# Patient Record
Sex: MALE | Race: WHITE | NOT HISPANIC OR LATINO | Employment: OTHER | URBAN - METROPOLITAN AREA
[De-identification: names, ages, dates, MRNs, and addresses within clinical notes are randomized per-mention and may not be internally consistent; named-entity substitution may affect disease eponyms.]

---

## 2017-05-18 ENCOUNTER — TELEPHONE (OUTPATIENT)
Dept: MEDICAL GROUP | Age: 77
End: 2017-05-18

## 2017-05-22 RX ORDER — TERAZOSIN 2 MG/1
CAPSULE ORAL
Refills: 1 | COMMUNITY
Start: 2017-04-24 | End: 2017-06-22 | Stop reason: SDUPTHER

## 2017-05-22 RX ORDER — LEVOTHYROXINE SODIUM 0.1 MG/1
TABLET ORAL
Refills: 1 | COMMUNITY
Start: 2017-04-24 | End: 2017-06-22 | Stop reason: SDUPTHER

## 2017-05-22 RX ORDER — CLINDAMYCIN HYDROCHLORIDE 300 MG/1
CAPSULE ORAL
Refills: 0 | COMMUNITY
Start: 2017-04-24 | End: 2017-07-13

## 2017-05-22 RX ORDER — METRONIDAZOLE 250 MG/1
TABLET ORAL
Refills: 0 | COMMUNITY
Start: 2017-04-24 | End: 2017-07-13

## 2017-05-22 NOTE — TELEPHONE ENCOUNTER
Phone Number Called: [phone] (home)     Message: Spoke with patient's spouse and left message for patient to call back.    Left Message for patient to call back: yes

## 2017-05-22 NOTE — TELEPHONE ENCOUNTER
NEW PATIENT VISIT PRE-VISIT PLANNING    1.  EpicCare Patient is checked in Patient Demographics? YES    2.  Immunizations were updated in Epic using WebIZ?: No WebIZ record       •  Web Iz Recommendations:     3.  Patient is due for the following Health Maintenance Topics:   Health Maintenance Due   Topic Date Due   • Annual Wellness Visit  1940   • IMM DTaP/Tdap/Td Vaccine (1 - Tdap) 04/17/1960   • IMM ZOSTER VACCINE  04/17/2001   • IMM PNEUMOCOCCAL 65+ (ADULT) LOW/MEDIUM RISK SERIES (1 of 2 - PCV13) 04/17/2006       - Patient has completed Colonoscopy/FIT and HMR Letter faxed to:  Records in [facility] from [facility].    4.  Reviewed/Updated the following with patient:       •   Preferred Pharmacy? YES       •   Preferred Lab? YES       •   Medications? YES. Was Abstract Encounter opened and chart updated? YES       •   Social History? YES. Was Abstract Encounter opened and chart updated? YES       •   Family History? YES. Was Abstract Encounter opened and chart updated? YES    5.  Updated Care Team?       •   DME Company (gait device, O2, CPAP, etc.) N\A       •   Other Specialists (eye doctor, derm, GYN, cardiology, endo, etc): YES    6.  Patient was informed to arrive 15 min prior to their scheduled appointment and bring in their medication bottles? YES

## 2017-05-25 ENCOUNTER — OFFICE VISIT (OUTPATIENT)
Dept: MEDICAL GROUP | Age: 77
End: 2017-05-25
Payer: MEDICARE

## 2017-05-25 VITALS
DIASTOLIC BLOOD PRESSURE: 74 MMHG | SYSTOLIC BLOOD PRESSURE: 124 MMHG | WEIGHT: 218.2 LBS | TEMPERATURE: 97.7 F | HEART RATE: 92 BPM | OXYGEN SATURATION: 93 % | BODY MASS INDEX: 33.07 KG/M2 | HEIGHT: 68 IN

## 2017-05-25 DIAGNOSIS — Z23 NEED FOR VACCINATION: ICD-10-CM

## 2017-05-25 DIAGNOSIS — E06.3 HASHIMOTO'S THYROIDITIS: ICD-10-CM

## 2017-05-25 DIAGNOSIS — E78.5 HYPERLIPIDEMIA WITH TARGET LDL LESS THAN 100: ICD-10-CM

## 2017-05-25 DIAGNOSIS — M51.9 LUMBAR DISC DISEASE: ICD-10-CM

## 2017-05-25 DIAGNOSIS — E66.9 OBESITY (BMI 30-39.9): ICD-10-CM

## 2017-05-25 DIAGNOSIS — E03.9 ACQUIRED HYPOTHYROIDISM: ICD-10-CM

## 2017-05-25 DIAGNOSIS — Z00.00 PREVENTATIVE HEALTH CARE: ICD-10-CM

## 2017-05-25 DIAGNOSIS — N18.1 CHRONIC RENAL IMPAIRMENT, STAGE 1: ICD-10-CM

## 2017-05-25 DIAGNOSIS — G47.31 PRIMARY CENTRAL SLEEP APNEA: ICD-10-CM

## 2017-05-25 PROCEDURE — 90732 PPSV23 VACC 2 YRS+ SUBQ/IM: CPT | Performed by: FAMILY MEDICINE

## 2017-05-25 PROCEDURE — 99214 OFFICE O/P EST MOD 30 MIN: CPT | Mod: 25 | Performed by: FAMILY MEDICINE

## 2017-05-25 PROCEDURE — G0009 ADMIN PNEUMOCOCCAL VACCINE: HCPCS | Performed by: FAMILY MEDICINE

## 2017-05-25 ASSESSMENT — PATIENT HEALTH QUESTIONNAIRE - PHQ9: CLINICAL INTERPRETATION OF PHQ2 SCORE: 0

## 2017-05-25 NOTE — ASSESSMENT & PLAN NOTE
Patient states that he has a history of chronic low back pain and takes Voltaren and NSAIDs as needed. Denies any loss of bladder or bowel function numbness or tingling in the perineum.

## 2017-05-25 NOTE — ASSESSMENT & PLAN NOTE
Patient states that he's been compliant with Synthroid 100 µg by mouth daily.denies any new fatigue, cold/heat intolerance, weight gain/weight loss, diarrhea/constipation, dry skin, myalgia, depressed mood, palpitations, tremmor, hair loss, and no goiter.

## 2017-05-25 NOTE — PROGRESS NOTES
This medical record contains text that has been entered with the assistance of computer voice recognition and dictation software.  Therefore, it may contain unintended errors in text, spelling, punctuation, or grammar    Chief Complaint   Patient presents with   • Other     see reason for visit       Donny Nicole is a 77 y.o. male here evaluation and management of: Establish care, back pain, sleep apnea, renal insufficiency, hypothyroid      HPI:     Sleep apnea  Wears CPAP every night  Even with daytime naps    Lumbar disc disease  Patient states that he has a history of chronic low back pain and takes Voltaren and NSAIDs as needed. Denies any loss of bladder or bowel function numbness or tingling in the perineum.    Chronic renal impairment, stage 1  Patient states he is unaware that he had renal insufficiency. He denies any abdominal pain no change in urination, no fevers, back pain no blood in urine.      Results for DONNY NICOLE (MRN [id number]) as of 5/25/2017 16:29   Ref. Range 2/5/2014 13:57   Creatinine Latest Ref Range: 0.50-1.40 mg/dL 1.39   GFR If  Latest Units: mL/min/1.73 m 2 >60   GFR If Non  Latest Units: mL/min/1.73 m 2 50 (A)       Acquired hypothyroidism  Patient states that he's been compliant with Synthroid 100 µg by mouth daily.denies any new fatigue, cold/heat intolerance, weight gain/weight loss, diarrhea/constipation, dry skin, myalgia, depressed mood, palpitations, tremmor, hair loss, and no goiter.    Preventative health care  The patient is a very pleasant 77-year-old male who presents to clinic to establish care. He has not seen his primary care provider since he left here. He has a significant past medical history of renal insufficiency, hypothyroidism, hyperlipidemia, obesity. He is a classic . The patient denied any chest pain, no sob, no dixon, no  pnd, no orthopnea, no headache, no changes in vision, no  numbness or tingling, no nausea, no diarrhea, no abdominal pain, no fevers, no chills, no bright red blood per rectum, no  difficulty urinating, no burning during micturition, no depressed mood, no other concerns.      Current medicines (including changes today)  Current Outpatient Prescriptions   Medication Sig Dispense Refill   • clindamycin (CLEOCIN) 300 MG Cap TK 1 C TID PO TAT  0   • levothyroxine (SYNTHROID) 100 MCG Tab TK 1 T PO D  COURTESY REFILL.LAST SEEN 1/2016. PLEASE  1   • terazosin (HYTRIN) 2 MG Cap TK 1 C PO QHS  CHECK WITH DOCTOR IN [place] FOR BP MONITORING AND MANAGEMENT.PLEASE  1   • atorvastatin (LIPITOR) 20 MG Tab Take 1 Tab by mouth every day. 30 Tab 0   • vitamin D, Ergocalciferol, (DRISDOL) 30558 UNITS CAPS capsule Take 1 Cap by mouth every 7 days. 8 Cap 0   • aspirin EC (ECOTRIN) 81 MG TBEC Take 81 mg by mouth every day.     • metronidazole (FLAGYL) 250 MG Tab TK 1 T PO TID  0   • levothyroxine (SYNTHROID) 75 MCG TABS Take 1 Tab by mouth every day. (Patient not taking: Reported on 5/22/2017) 30 Tab 0   • Diclofenac Sodium (VOLTAREN) 1 % GEL Apply 2 gm to effected fingers and heel qid prn. (Patient not taking: Reported on 5/22/2017) 1 Tube 3     No current facility-administered medications for this visit.     He  has a past medical history of Thyroid disease and Hyperlipidemia.  He  has past surgical history that includes dental surgery; tonsillectomy; and hernia repair.  Social History   Substance Use Topics   • Smoking status: Former Smoker -- 1.00 packs/day for 19 years     Types: Cigarettes     Quit date: 03/22/1979   • Smokeless tobacco: Never Used   • Alcohol Use: Yes      Comment: 4 drinks a week.     Social History     Social History Narrative     Family History   Problem Relation Age of Onset   • Cancer Mother      breast cancer   • Thyroid Sister      Hasimotos   • Diabetes Sister      Type 2 DM   • Thyroid Brother      Hasimotos   • Diabetes Brother      Type 2 DM   • No Known  "Problems Maternal Grandmother    • Heart Attack Maternal Grandfather      Family Status   Relation Status Death Age   • Mother  58   • Father  86   • Sister Alive    • Brother Alive    • Maternal Grandmother     • Maternal Grandfather     • Paternal Grandmother     • Paternal Grandfather           ROS  Please see history of present illness    All other systems reviewed and are negative     Objective:     Blood pressure 124/74, pulse 92, temperature 36.5 °C (97.7 °F), height 1.721 m (5' 7.76\"), weight 98.975 kg (218 lb 3.2 oz), SpO2 93 %. Body mass index is 33.42 kg/(m^2).  Physical Exam:    Constitutional: Alert, no distress.  Skin: Warm, dry, good turgor, no rashes in visible areas.  Eye: Equal, round and reactive, conjunctiva clear, lids normal.  ENMT: Lips without lesions, good dentition, oropharynx clear.  Neck: Trachea midline, no masses, no thyromegaly. No cervical or supraclavicular lymphadenopathy.  Respiratory: Unlabored respiratory effort, lungs clear to auscultation, no wheezes, no ronchi.  Cardiovascular: Normal S1, S2, no murmur, no edema.  Abdomen: Soft, non-tender, no masses, no hepatosplenomegaly.  Psych: Alert and oriented x3, normal affect and mood.          Assessment and Plan:   The following treatment plan was discussed, again this medical record contains text that has been entered with the assistance of computer voice recognition and dictation software.  Therefore, it may contain unintended errors in text, spelling, punctuation, or grammar      1. Chronic renal impairment, stage 1  Continue bp control  Avoid Nsaids and all nephrotoxins    - COMP METABOLIC PANEL; Future  - CBC WITH DIFFERENTIAL; Future    2. Need for vaccination  Given today    - Pneumococal Polysaccharide Vaccine 23-Valent =>1yo SQ/IM    3. Hyperlipidemia with target LDL less than 100  Patient has been stable with current management  We will make no changes for now    - LIPID " PROFILE; Future    4. Hashimoto's thyroiditis  Patient has been stable with current management  We will make no changes for now    - TSH WITH REFLEX TO FT4; Future    5. Lumbar disc disease    hat overall prognosis of back pain is favorable, he is to use ICE x 2 days, then switch to heat,  Nsaids,  and to ease back into normal activity as quickly as possible,  also to use proper lifting techniques (use legs not back), no clinical indication for imaging. Also counseled patient on low back stretching, hamstring stretching, core strengthening once no longer in acute pain.     6. Obesity (BMI 30-39.9)    We discussed agressive lifestyle modifications with weight loss, aerobic exercise, and eating a prudent diet, which  included avoiding fried foods, using low-fat milk and avoiding simple carbohydrate, making a food diary. If you can't run because of pain do the stationary bike/elipticle or swim 30minutes 3 times per wk, in the beginning and then gradually increase.      7. Primary central sleep apnea  Congratulated patient on being so compliant with CPAP  I encouraged him to continue    8. Acquired hypothyroidism  Needs new labs      9. Preventative health care  Care has been established  We need baseline labs to establish a clinical profile  We reviewed USPSTF guidelines  Up to date on colonoscopy   Denies intimate partner viloence          HEALTH MAINTENANCE: due for AWV    Followup: Return in about 3 months (around 8/25/2017) for Reevaluation.

## 2017-05-25 NOTE — ASSESSMENT & PLAN NOTE
Patient states he is unaware that he had renal insufficiency. He denies any abdominal pain no change in urination, no fevers, back pain no blood in urine.      Results for GUERRERO NICOLE (MRN [id number]) as of 5/25/2017 16:29   Ref. Range 2/5/2014 13:57   Creatinine Latest Ref Range: 0.50-1.40 mg/dL 1.39   GFR If  Latest Units: mL/min/1.73 m 2 >60   GFR If Non  Latest Units: mL/min/1.73 m 2 50 (A)

## 2017-05-25 NOTE — ASSESSMENT & PLAN NOTE
The patient is a very pleasant 77-year-old male who presents to clinic to establish care. He has not seen his primary care provider since he left here. He has a significant past medical history of renal insufficiency, hypothyroidism, hyperlipidemia, obesity. He is a classic . The patient denied any chest pain, no sob, no dixon, no  pnd, no orthopnea, no headache, no changes in vision, no numbness or tingling, no nausea, no diarrhea, no abdominal pain, no fevers, no chills, no bright red blood per rectum, no  difficulty urinating, no burning during micturition, no depressed mood, no other concerns.

## 2017-05-31 ENCOUNTER — HOSPITAL ENCOUNTER (OUTPATIENT)
Dept: LAB | Facility: MEDICAL CENTER | Age: 77
End: 2017-05-31
Attending: FAMILY MEDICINE
Payer: MEDICARE

## 2017-05-31 DIAGNOSIS — N18.1 CHRONIC RENAL IMPAIRMENT, STAGE 1: ICD-10-CM

## 2017-05-31 DIAGNOSIS — E78.00 HYPERCHOLESTEREMIA: ICD-10-CM

## 2017-05-31 DIAGNOSIS — E78.5 HYPERLIPIDEMIA WITH TARGET LDL LESS THAN 100: ICD-10-CM

## 2017-05-31 DIAGNOSIS — E06.3 HASHIMOTO'S THYROIDITIS: ICD-10-CM

## 2017-05-31 LAB
ALBUMIN SERPL BCP-MCNC: 4.1 G/DL (ref 3.2–4.9)
ALBUMIN/GLOB SERPL: 1.4 G/DL
ALP SERPL-CCNC: 68 U/L (ref 30–99)
ALT SERPL-CCNC: 22 U/L (ref 2–50)
ANION GAP SERPL CALC-SCNC: 7 MMOL/L (ref 0–11.9)
AST SERPL-CCNC: 20 U/L (ref 12–45)
BASOPHILS # BLD AUTO: 1 % (ref 0–1.8)
BASOPHILS # BLD: 0.05 K/UL (ref 0–0.12)
BILIRUB SERPL-MCNC: 0.8 MG/DL (ref 0.1–1.5)
BUN SERPL-MCNC: 23 MG/DL (ref 8–22)
CALCIUM SERPL-MCNC: 9.1 MG/DL (ref 8.5–10.5)
CHLORIDE SERPL-SCNC: 105 MMOL/L (ref 96–112)
CHOLEST SERPL-MCNC: 166 MG/DL (ref 100–199)
CO2 SERPL-SCNC: 28 MMOL/L (ref 20–33)
CREAT SERPL-MCNC: 1.23 MG/DL (ref 0.5–1.4)
EOSINOPHIL # BLD AUTO: 0.23 K/UL (ref 0–0.51)
EOSINOPHIL NFR BLD: 4.6 % (ref 0–6.9)
ERYTHROCYTE [DISTWIDTH] IN BLOOD BY AUTOMATED COUNT: 46 FL (ref 35.9–50)
GFR SERPL CREATININE-BSD FRML MDRD: 57 ML/MIN/1.73 M 2
GLOBULIN SER CALC-MCNC: 2.9 G/DL (ref 1.9–3.5)
GLUCOSE SERPL-MCNC: 90 MG/DL (ref 65–99)
HCT VFR BLD AUTO: 46.1 % (ref 42–52)
HDLC SERPL-MCNC: 56 MG/DL
HGB BLD-MCNC: 15.1 G/DL (ref 14–18)
IMM GRANULOCYTES # BLD AUTO: 0.04 K/UL (ref 0–0.11)
IMM GRANULOCYTES NFR BLD AUTO: 0.8 % (ref 0–0.9)
LDLC SERPL CALC-MCNC: 89 MG/DL
LYMPHOCYTES # BLD AUTO: 1.28 K/UL (ref 1–4.8)
LYMPHOCYTES NFR BLD: 25.9 % (ref 22–41)
MCH RBC QN AUTO: 31.3 PG (ref 27–33)
MCHC RBC AUTO-ENTMCNC: 32.8 G/DL (ref 33.7–35.3)
MCV RBC AUTO: 95.4 FL (ref 81.4–97.8)
MONOCYTES # BLD AUTO: 0.46 K/UL (ref 0–0.85)
MONOCYTES NFR BLD AUTO: 9.3 % (ref 0–13.4)
NEUTROPHILS # BLD AUTO: 2.89 K/UL (ref 1.82–7.42)
NEUTROPHILS NFR BLD: 58.4 % (ref 44–72)
NRBC # BLD AUTO: 0 K/UL
NRBC BLD AUTO-RTO: 0 /100 WBC
PLATELET # BLD AUTO: 212 K/UL (ref 164–446)
PMV BLD AUTO: 10.8 FL (ref 9–12.9)
POTASSIUM SERPL-SCNC: 4.5 MMOL/L (ref 3.6–5.5)
PROT SERPL-MCNC: 7 G/DL (ref 6–8.2)
RBC # BLD AUTO: 4.83 M/UL (ref 4.7–6.1)
SODIUM SERPL-SCNC: 140 MMOL/L (ref 135–145)
TRIGL SERPL-MCNC: 104 MG/DL (ref 0–149)
TSH SERPL DL<=0.005 MIU/L-ACNC: 1.13 UIU/ML (ref 0.3–3.7)
WBC # BLD AUTO: 5 K/UL (ref 4.8–10.8)

## 2017-05-31 PROCEDURE — 85025 COMPLETE CBC W/AUTO DIFF WBC: CPT

## 2017-05-31 PROCEDURE — 84443 ASSAY THYROID STIM HORMONE: CPT

## 2017-05-31 PROCEDURE — 80053 COMPREHEN METABOLIC PANEL: CPT

## 2017-05-31 PROCEDURE — 80061 LIPID PANEL: CPT

## 2017-05-31 PROCEDURE — 36415 COLL VENOUS BLD VENIPUNCTURE: CPT

## 2017-06-01 RX ORDER — ATORVASTATIN CALCIUM 20 MG/1
20 TABLET, FILM COATED ORAL
Qty: 30 TAB | Refills: 0 | Status: SHIPPED | OUTPATIENT
Start: 2017-06-01 | End: 2017-07-05 | Stop reason: SDUPTHER

## 2017-06-01 NOTE — TELEPHONE ENCOUNTER
Refill done.    Kenneth Guzmán MD  Diplomat, [facility]  [address] [place] [id number]

## 2017-06-22 ENCOUNTER — OFFICE VISIT (OUTPATIENT)
Dept: MEDICAL GROUP | Age: 77
End: 2017-06-22
Payer: MEDICARE

## 2017-06-22 VITALS
HEIGHT: 68 IN | OXYGEN SATURATION: 92 % | TEMPERATURE: 97.2 F | BODY MASS INDEX: 34.31 KG/M2 | DIASTOLIC BLOOD PRESSURE: 76 MMHG | HEART RATE: 69 BPM | WEIGHT: 226.4 LBS | SYSTOLIC BLOOD PRESSURE: 138 MMHG

## 2017-06-22 DIAGNOSIS — E66.9 OBESITY (BMI 35.0-39.9 WITHOUT COMORBIDITY): ICD-10-CM

## 2017-06-22 DIAGNOSIS — E66.9 OBESITY (BMI 30-39.9): ICD-10-CM

## 2017-06-22 DIAGNOSIS — H66.91 ACUTE RIGHT OTITIS MEDIA: ICD-10-CM

## 2017-06-22 DIAGNOSIS — J06.9 VIRAL URI: ICD-10-CM

## 2017-06-22 PROCEDURE — 99214 OFFICE O/P EST MOD 30 MIN: CPT | Performed by: FAMILY MEDICINE

## 2017-06-22 RX ORDER — IBUPROFEN 800 MG/1
800 TABLET ORAL 2 TIMES DAILY PRN
Qty: 30 TAB | Refills: 0 | Status: SHIPPED | OUTPATIENT
Start: 2017-06-22 | End: 2017-09-29

## 2017-06-22 RX ORDER — MECLIZINE HCL 12.5 MG/1
12.5 TABLET ORAL 3 TIMES DAILY PRN
Qty: 30 TAB | Refills: 0 | Status: SHIPPED | OUTPATIENT
Start: 2017-06-22 | End: 2017-06-28 | Stop reason: SDUPTHER

## 2017-06-22 RX ORDER — LEVOTHYROXINE SODIUM 0.1 MG/1
TABLET ORAL
Qty: 90 TAB | Refills: 1 | Status: SHIPPED | OUTPATIENT
Start: 2017-06-22 | End: 2018-08-06 | Stop reason: SDUPTHER

## 2017-06-22 RX ORDER — AZITHROMYCIN 250 MG/1
TABLET, FILM COATED ORAL
Qty: 6 TAB | Refills: 0 | Status: SHIPPED | OUTPATIENT
Start: 2017-06-22 | End: 2017-06-27

## 2017-06-22 RX ORDER — TERAZOSIN 2 MG/1
CAPSULE ORAL
Qty: 90 CAP | Refills: 0 | Status: SHIPPED | OUTPATIENT
Start: 2017-06-22 | End: 2017-10-03 | Stop reason: SDUPTHER

## 2017-06-22 NOTE — PROGRESS NOTES
This medical record contains text that has been entered with the assistance of computer voice recognition and dictation software.  Therefore, it may contain unintended errors in text, spelling, punctuation, or grammar    Chief Complaint   Patient presents with   • Other     see reason for visit       Donny Haas is a 77 y.o. male here evaluation and management of: Right ear pain, dizziness    HPI:     Acute right otitis media  Patient states that on June 13 when he was traveling to [place] he felt sore throat and right-sided ear pain. This resolved on its own he returned to [place] week later and right ear pain became worse it was sharp, associated with dizziness. He also had some difficulty swallowing, associated with a nonproductive cough. He also complains of generalized malaise, nasal sinus congestion, greenish discharge from bilateral eyes. He is able to tolerate by mouth denies any nausea no vomiting, he has been able to hydrate himself. He denies any neck stiffness, no headaches no double vision no changes in vision.      Current medicines (including changes today)  Current Outpatient Prescriptions   Medication Sig Dispense Refill   • azithromycin (ZITHROMAX) 250 MG Tab 2 tabs by mouth day 1, 1 tab by mouth days 2-5 6 Tab 0   • meclizine (ANTIVERT) 12.5 MG Tab Take 1 Tab by mouth 3 times a day as needed. 30 Tab 0   • ibuprofen (MOTRIN) 800 MG Tab Take 1 Tab by mouth 2 times a day as needed. 30 Tab 0   • levothyroxine (SYNTHROID) 100 MCG Tab Take one tab po q24h 90 Tab 1   • terazosin (HYTRIN) 2 MG Cap Take one tab po q24h 90 Cap 0   • atorvastatin (LIPITOR) 20 MG Tab Take 1 Tab by mouth every day. 30 Tab 0   • clindamycin (CLEOCIN) 300 MG Cap TK 1 C TID PO TAT  0   • vitamin D, Ergocalciferol, (DRISDOL) 46811 UNITS CAPS capsule Take 1 Cap by mouth every 7 days. 8 Cap 0   • aspirin EC (ECOTRIN) 81 MG TBEC Take 81 mg by mouth every day.     • metronidazole (FLAGYL) 250 MG Tab TK 1 T PO TID   "0   • levothyroxine (SYNTHROID) 75 MCG TABS Take 1 Tab by mouth every day. (Patient not taking: Reported on 2017) 30 Tab 0   • Diclofenac Sodium (VOLTAREN) 1 % GEL Apply 2 gm to effected fingers and heel qid prn. (Patient not taking: Reported on 2017) 1 Tube 3     No current facility-administered medications for this visit.     He  has a past medical history of Thyroid disease and Hyperlipidemia.  He  has past surgical history that includes dental surgery; tonsillectomy; and hernia repair.  Social History   Substance Use Topics   • Smoking status: Former Smoker -- 1.00 packs/day for 19 years     Types: Cigarettes     Quit date: 1979   • Smokeless tobacco: Never Used   • Alcohol Use: 0.0 oz/week     0 Standard drinks or equivalent per week      Comment: 4 drinks a week.     Social History     Social History Narrative     Family History   Problem Relation Age of Onset   • Cancer Mother      breast cancer   • Thyroid Sister      Hasimotos   • Diabetes Sister      Type 2 DM   • Thyroid Brother      Hasimotos   • Diabetes Brother      Type 2 DM   • No Known Problems Maternal Grandmother    • Heart Attack Maternal Grandfather      Family Status   Relation Status Death Age   • Mother  58   • Father  86   • Sister Alive    • Brother Alive    • Maternal Grandmother     • Maternal Grandfather     • Paternal Grandmother     • Paternal Grandfather           ROS  Please see history of present illness    All other systems reviewed and are negative     Objective:     Blood pressure 138/76, pulse 69, temperature 36.2 °C (97.2 °F), height 1.721 m (5' 7.76\"), weight 102.694 kg (226 lb 6.4 oz), SpO2 92 %. Body mass index is 34.67 kg/(m^2).  Physical Exam:    Constitutional: Alert, no distress.  Skin: Warm, dry, good turgor, no rashes in visible areas.  Eye: Equal, round and reactive, bilateral bulging erythematous TM, pain noted while pulling on tragur  ENMT: Lips without " lesions, good dentition, oropharynx clear.  Neck: Trachea midline, no masses, no thyromegaly. No cervical or supraclavicular lymphadenopathy.  Respiratory: Unlabored respiratory effort, lungs clear to auscultation, no wheezes, no ronchi.  Cardiovascular: Normal S1, S2, no murmur, no edema.  Abdomen: Soft, non-tender, no masses, no hepatosplenomegaly.  Psych: Alert and oriented x3, normal affect and mood.          Assessment and Plan:   The following treatment plan was discussed        1. Acute right otitis media    The patient is nontoxic in appearance  Afebrile and  hemodynamically stable  We will proceed with outpatient rx      - azithromycin (ZITHROMAX) 250 MG Tab; 2 tabs by mouth day 1, 1 tab by mouth days 2-5  Dispense: 6 Tab; Refill: 0  - meclizine (ANTIVERT) 12.5 MG Tab; Take 1 Tab by mouth 3 times a day as needed.  Dispense: 30 Tab; Refill: 0  - ibuprofen (MOTRIN) 800 MG Tab; Take 1 Tab by mouth 2 times a day as needed.  Dispense: 30 Tab; Refill: 0      HEALTH MAINTENANCE: due for AWV    Instructed to Follow up in clinic or ER for worsening symptoms, difficulty breathing, lack of expected recovery, or should new symptoms or problems arise.    Followup: Return in about 3 months (around 9/22/2017) for Reevaluation.       Once again this medical record contains text that has been entered with the assistance of computer voice recognition and dictation software.  Therefore, it may contain unintended errors in text, spelling, punctuation, or grammar

## 2017-06-22 NOTE — ASSESSMENT & PLAN NOTE
Patient states that on June 13 when he was traveling to [place] he felt sore throat and right-sided ear pain. This resolved on its own he returned to [place] week later and right ear pain became worse it was sharp, associated with dizziness. He also had some difficulty swallowing, associated with a nonproductive cough. He also complains of generalized malaise, nasal sinus congestion, greenish discharge from bilateral eyes. He is able to tolerate by mouth denies any nausea no vomiting, he has been able to hydrate himself. He denies any neck stiffness, no headaches no double vision no changes in vision.

## 2017-06-22 NOTE — ASSESSMENT & PLAN NOTE
Started on June 12, 2017  Started with right ear pain and throat pain  Last night the ear pain was severe, plus headache  Eyes started watering and thick discharge

## 2017-06-28 ENCOUNTER — TELEPHONE (OUTPATIENT)
Dept: MEDICAL GROUP | Age: 77
End: 2017-06-28

## 2017-06-28 DIAGNOSIS — H66.91 ACUTE RIGHT OTITIS MEDIA: ICD-10-CM

## 2017-06-28 RX ORDER — MECLIZINE HCL 12.5 MG/1
12.5 TABLET ORAL 2 TIMES DAILY PRN
Qty: 30 TAB | Refills: 0 | Status: SHIPPED | OUTPATIENT
Start: 2017-06-28 | End: 2017-07-13

## 2017-06-28 NOTE — TELEPHONE ENCOUNTER
1. Caller Name: Dnony Haas                                         Call Back Number: [phone]      Patient approves a detailed voicemail message: N\A    Patient called stating he finished taking the meclizine and is still experiencing pain in both of his ears and also has swollen glands. He would like to know if he should get a refill on meclizine in order to help with this or if it will clear up on its own, please advise.

## 2017-06-29 NOTE — TELEPHONE ENCOUNTER
Phone Number Called: [phone] (home)     Message: Patient informed of refill sent to pharmacy    Left Message for patient to call back: no

## 2017-06-29 NOTE — TELEPHONE ENCOUNTER
Maria Fernanda Guzmán M.D.  Abby Ramsay, [facility]        Caller: Unspecified (Today, 2:11 PM)                     I sent in more meclizine.

## 2017-07-05 RX ORDER — ATORVASTATIN CALCIUM 20 MG/1
TABLET, FILM COATED ORAL
Qty: 30 TAB | Refills: 0 | Status: SHIPPED | OUTPATIENT
Start: 2017-07-05 | End: 2017-07-29 | Stop reason: SDUPTHER

## 2017-07-13 ENCOUNTER — OFFICE VISIT (OUTPATIENT)
Dept: URGENT CARE | Facility: CLINIC | Age: 77
End: 2017-07-13
Payer: MEDICARE

## 2017-07-13 ENCOUNTER — HOSPITAL ENCOUNTER (EMERGENCY)
Facility: MEDICAL CENTER | Age: 77
End: 2017-07-13
Attending: EMERGENCY MEDICINE
Payer: MEDICARE

## 2017-07-13 VITALS
HEIGHT: 67 IN | SYSTOLIC BLOOD PRESSURE: 110 MMHG | BODY MASS INDEX: 35.31 KG/M2 | HEART RATE: 88 BPM | DIASTOLIC BLOOD PRESSURE: 78 MMHG | TEMPERATURE: 97.3 F | WEIGHT: 225 LBS | OXYGEN SATURATION: 93 %

## 2017-07-13 VITALS
HEART RATE: 75 BPM | OXYGEN SATURATION: 94 % | DIASTOLIC BLOOD PRESSURE: 79 MMHG | HEIGHT: 67 IN | SYSTOLIC BLOOD PRESSURE: 144 MMHG | WEIGHT: 224.87 LBS | BODY MASS INDEX: 35.29 KG/M2 | RESPIRATION RATE: 20 BRPM | TEMPERATURE: 97.8 F

## 2017-07-13 DIAGNOSIS — L50.9 HIVES: ICD-10-CM

## 2017-07-13 DIAGNOSIS — R47.9 DIFFICULTY SPEAKING: ICD-10-CM

## 2017-07-13 DIAGNOSIS — T78.3XXA ANGIOEDEMA, INITIAL ENCOUNTER: Primary | ICD-10-CM

## 2017-07-13 DIAGNOSIS — L50.9 URTICARIA: ICD-10-CM

## 2017-07-13 DIAGNOSIS — T78.40XA ALLERGIC REACTION, INITIAL ENCOUNTER: ICD-10-CM

## 2017-07-13 PROCEDURE — A9270 NON-COVERED ITEM OR SERVICE: HCPCS | Performed by: EMERGENCY MEDICINE

## 2017-07-13 PROCEDURE — 700111 HCHG RX REV CODE 636 W/ 250 OVERRIDE (IP): Performed by: EMERGENCY MEDICINE

## 2017-07-13 PROCEDURE — 96372 THER/PROPH/DIAG INJ SC/IM: CPT

## 2017-07-13 PROCEDURE — 99284 EMERGENCY DEPT VISIT MOD MDM: CPT

## 2017-07-13 PROCEDURE — 99205 OFFICE O/P NEW HI 60 MIN: CPT | Performed by: PHYSICIAN ASSISTANT

## 2017-07-13 PROCEDURE — 700102 HCHG RX REV CODE 250 W/ 637 OVERRIDE(OP): Performed by: EMERGENCY MEDICINE

## 2017-07-13 RX ORDER — EPINEPHRINE 0.3 MG/.3ML
0.3 INJECTION SUBCUTANEOUS ONCE
Qty: 0.3 ML | Refills: 1 | Status: SHIPPED | OUTPATIENT
Start: 2017-07-13 | End: 2017-07-13

## 2017-07-13 RX ORDER — PREDNISONE 20 MG/1
40 TABLET ORAL ONCE
Status: COMPLETED | OUTPATIENT
Start: 2017-07-13 | End: 2017-07-13

## 2017-07-13 RX ORDER — FAMOTIDINE 20 MG/1
20 TABLET, FILM COATED ORAL ONCE
Status: COMPLETED | OUTPATIENT
Start: 2017-07-13 | End: 2017-07-13

## 2017-07-13 RX ORDER — PREDNISONE 20 MG/1
40 TABLET ORAL DAILY
Qty: 10 TAB | Refills: 0 | Status: SHIPPED | OUTPATIENT
Start: 2017-07-13 | End: 2017-07-13

## 2017-07-13 RX ORDER — DIPHENHYDRAMINE HYDROCHLORIDE 50 MG/ML
50 INJECTION INTRAMUSCULAR; INTRAVENOUS EVERY 6 HOURS PRN
Status: DISCONTINUED | OUTPATIENT
Start: 2017-07-13 | End: 2017-07-13 | Stop reason: HOSPADM

## 2017-07-13 RX ORDER — PREDNISONE 20 MG/1
40 TABLET ORAL DAILY
Qty: 10 TAB | Refills: 0 | Status: SHIPPED | OUTPATIENT
Start: 2017-07-13 | End: 2017-07-18

## 2017-07-13 RX ADMIN — PREDNISONE 40 MG: 20 TABLET ORAL at 18:14

## 2017-07-13 RX ADMIN — FAMOTIDINE 20 MG: 20 TABLET ORAL at 18:14

## 2017-07-13 RX ADMIN — DIPHENHYDRAMINE HYDROCHLORIDE 50 MG: 50 INJECTION, SOLUTION INTRAMUSCULAR; INTRAVENOUS at 18:15

## 2017-07-13 ASSESSMENT — PAIN SCALES - GENERAL: PAINLEVEL_OUTOF10: 0

## 2017-07-13 NOTE — ED AVS SNAPSHOT
[employer] Access Code: Activation code not generated  Current [employer] Status: Active    [employer]  A secure, online tool to manage your health information     [employer]’s [employer]® is a secure, online tool that connects you to your personalized health information from the privacy of your home -- day or night - making it very easy for you to manage your healthcare. Once the activation process is completed, you can even access your medical information using the [employer] ti, which is available for free in the Apple Ti store or Google Play store.     [employer] provides the following levels of access (as shown below):   My Chart Features   [facility] Primary Care Doctor [facility]  Specialists [facility]  Urgent  Care Non-[facility]  Primary Care  Doctor   Email your healthcare team securely and privately 24/7 X X X X   Manage appointments: schedule your next appointment; view details of past/upcoming appointments X      Request prescription refills. X      View recent personal medical records, including lab and immunizations X X X X   View health record, including health history, allergies, medications X X X X   Read reports about your outpatient visits, procedures, consult and ER notes X X X X   See your discharge summary, which is a recap of your hospital and/or ER visit that includes your diagnosis, lab results, and care plan. X X       How to register for [employer]:  1. Go to  [email or web address].  2. Click on the Sign Up Now box, which takes you to the New Member Sign Up page. You will need to provide the following information:  a. Enter your [employer] Access Code exactly as it appears at the top of this page. (You will not need to use this code after you’ve completed the sign-up process. If you do not sign up before the expiration date, you must request a new code.)   b. Enter your date of birth.   c. Enter your home email address.   d. Click Submit, and follow the next screen’s instructions.  3. Create a [employer] ID. This will  be your "[employer] login ID and cannot be changed, so think of one that is secure and easy to remember.  4. Create a "[employer] password. You can change your password at any time.  5. Enter your Password Reset Question and Answer. This can be used at a later time if you forget your password.   6. Enter your e-mail address. This allows you to receive e-mail notifications when new information is available in "[employer].  7. Click Sign Up. You can now view your health information.    For assistance activating your "[employer] account, call [phone]

## 2017-07-13 NOTE — ED AVS SNAPSHOT
7/13/2017    Donny Haas  [address]  [place] [address]    Dear Donny:    [facility] wants to ensure your discharge home is safe and you or your loved ones have had all of your questions answered regarding your care after you leave the hospital.    Below is a list of resources and contact information should you have any questions regarding your hospital stay, follow-up instructions, or active medical symptoms.    Questions or Concerns Regarding… Contact   Medical Questions Related to Your Discharge  (7 days a week, 8am-5pm) Contact a Nurse Care Coordinator   [phone]   Medical Questions Not Related to Your Discharge  (24 hours a day / 7 days a week)  Contact the Nurse Health Line   [phone]    Medications or Discharge Instructions Refer to your discharge packet   or contact your [facility] Primary Care Provider   [phone]   Follow-up Appointment(s) Schedule your appointment via [employer]   or contact Scheduling [phone]   Billing Review your statement via [employer]  or contact Billing [phone]   Medical Records Review your records via [employer]   or contact Medical Records [phone]     You may receive a telephone call within two days of discharge. This call is to make certain you understand your discharge instructions and have the opportunity to have any questions answered. You can also easily access your medical information, test results and upcoming appointments via the [employer] free online health management tool. You can learn more and sign up at [email or web address]. For assistance setting up your [employer] account, please call [phone].    Once again, we want to ensure your discharge home is safe and that you have a clear understanding of any next steps in your care. If you have any questions or concerns, please do not hesitate to contact us, we are here for you. Thank you for choosing [facility] for your healthcare needs.    Sincerely,    Your [facility]  Healthcare Team

## 2017-07-13 NOTE — ED AVS SNAPSHOT
Home Care Instructions                                                                                                                Donny Haas   MRN: [id number]    Department:  [facility], Emergency Dept   Date of Visit:  7/13/2017            [facility], Emergency Dept    [address]    [place] [address]    Phone:  [phone]      You were seen by     Keith García M.D.      Your Diagnosis Was     Hives     L50.9       These are the medications you received during your hospitalization from 07/13/2017 1719 to 07/13/2017 1820     Date/Time Order Dose Route Action    07/13/2017 1815 diphenhydrAMINE (BENADRYL) injection 50 mg 50 mg Intramuscular Given    07/13/2017 1814 famotidine (PEPCID) tablet 20 mg 20 mg Oral Given    07/13/2017 1814 predniSONE (DELTASONE) tablet 40 mg 40 mg Oral Given      Follow-up Information     1. Follow up with Maria Fernanda Guzmán M.D.. Schedule an appointment as soon as possible for a visit in 1 week.    Specialty:  Family Medicine    Why:  For re-check, Return if any symptoms worsen    Contact information    [address] [place] [address] [phone]        Medication Information     Review all of your home medications and newly ordered medications with your primary doctor and/or pharmacist as soon as possible. Follow medication instructions as directed by your doctor and/or pharmacist.     Please keep your complete medication list with you and share with your physician. Update the information when medications are discontinued, doses are changed, or new medications (including over-the-counter products) are added; and carry medication information at all times in the event of emergency situations.               Medication List      START taking these medications        Instructions    Morning Afternoon Evening Bedtime    EPINEPHrine 0.3 MG/0.3ML Soaj solution for injection   Commonly known as:  EPIPEN           0.3 mL by Intramuscular route Once for 1 dose. Please dispense and EPI-PEN and teach use   Dose:  0.3 mg                        predniSONE 20 MG Tabs   Last time this was given:  40 mg on 7/13/2017  6:14 PM   Commonly known as:  DELTASONE        Take 2 Tabs by mouth every day for 5 days.   Dose:  40 mg                          ASK your doctor about these medications        Instructions    Morning Afternoon Evening Bedtime    aspirin EC 81 MG Tbec   Commonly known as:  ECOTRIN        Take 81 mg by mouth every day.   Dose:  81 mg                        atorvastatin 20 MG Tabs   Commonly known as:  LIPITOR        TAKE 1 TABLET BY MOUTH EVERY DAY                        Diclofenac Sodium 1 % Gel   Commonly known as:  VOLTAREN        Apply 2 gm to effected fingers and heel qid prn.                        ibuprofen 800 MG Tabs   Commonly known as:  MOTRIN        Take 1 Tab by mouth 2 times a day as needed.   Dose:  800 mg                        levothyroxine 100 MCG Tabs   Commonly known as:  SYNTHROID        Take one tab po q24h                        terazosin 2 MG Caps   Commonly known as:  HYTRIN        Take one tab po q24h                        vitamin D (Ergocalciferol) 61073 UNITS Caps capsule   Commonly known as:  DRISDOL        Take 1 Cap by mouth every 7 days.   Dose:  87721 Units                             Where to Get Your Medications      You can get these medications from any pharmacy     Bring a paper prescription for each of these medications    - EPINEPHrine 0.3 MG/0.3ML Soaj solution for injection  - predniSONE 20 MG Tabs              Discharge Instructions       Allergies  An allergy is an abnormal reaction to a substance by the body's defense system (immune system). Allergies can develop at any age.  WHAT CAUSES ALLERGIES?  An allergic reaction happens when the immune system mistakenly reacts to a normally harmless substance, called an allergen, as if it were harmful. The immune system releases  antibodies to fight the substance. Antibodies eventually release a chemical called histamine into the bloodstream. The release of histamine is meant to protect the body from infection, but it also causes discomfort.  An allergic reaction can be triggered by:  · Eating an allergen.  · Inhaling an allergen.  · Touching an allergen.  WHAT TYPES OF ALLERGIES ARE THERE?  There are many types of allergies. Common types include:  · Seasonal allergies. People with this type of allergy are usually allergic to substances that are only present during certain seasons, such as molds and pollens.  · Food allergies.  · Drug allergies.  · Insect allergies.  · Animal dander allergies.  WHAT ARE SYMPTOMS OF ALLERGIES?  Possible allergy symptoms include:  · Swelling of the lips, face, tongue, mouth, or throat.  · Sneezing, coughing, or wheezing.  · Nasal congestion.  · Tingling in the mouth.  · Rash.  · Itching.  · Itchy, red, swollen areas of skin (hives).  · Watery eyes.  · Vomiting.  · Diarrhea.  · Dizziness.  · Lightheadedness.  · Fainting.  · Trouble breathing or swallowing.  · Chest tightness.  · Rapid heartbeat.  HOW ARE ALLERGIES DIAGNOSED?  Allergies are diagnosed with a medical and family history and one or more of the following:  · Skin tests.  · Blood tests.  · A food diary. A food diary is a record of all the foods and drinks you have in a day and of all the symptoms you experience.  · The results of an elimination diet. An elimination diet involves eliminating foods from your diet and then adding them back in one by one to find out if a certain food causes an allergic reaction.  HOW ARE ALLERGIES TREATED?  There is no cure for allergies, but allergic reactions can be treated with medicine. Severe reactions usually need to be treated at a hospital.  HOW CAN REACTIONS BE PREVENTED?  The best way to prevent an allergic reaction is by avoiding the substance you are allergic to. Allergy shots and medicines can also help  prevent reactions in some cases. People with severe allergic reactions may be able to prevent a life-threatening reaction called anaphylaxis with a medicine given right after exposure to the allergen.     This information is not intended to replace advice given to you by your health care provider. Make sure you discuss any questions you have with your health care provider.     Document Released: 03/12/2004 Document Revised: 01/08/2016 Document Reviewed: 09/29/2015  [employer] Interactive Patient Education ©2016 [employer] Inc.    Epinephrine Injection  Epinephrine is a medicine given by injection to temporarily treat an emergency allergic reaction. It is also used to treat severe asthmatic attacks and other lung problems. The medicine helps to enlarge (dilate) the small breathing tubes of the lungs. A life-threatening, sudden allergic reaction that involves the whole body is called anaphylaxis. Because of potential side effects, epinephrine should only be used as directed by your caregiver.  RISKS AND COMPLICATIONS  Possible side effects of epinephrine injections include:  · Chest pain.  · Irregular or rapid heartbeat.  · Shortness of breath.  · Nausea.  · Vomiting.  · Abdominal pain or cramping.  · Sweating.  · Dizziness.  · Weakness.  · Headache.  · Nervousness.  Report all side effects to your caregiver.  HOW TO GIVE AN EPINEPHRINE INJECTION  Give the epinephrine injection immediately when symptoms of a severe reaction begin. Inject the medicine into the outer thigh or any available, large muscle. Your caregiver can teach you how to do this. You do not need to remove any clothing. After the injection, call your local emergency services (911 in [place] Even if you improve after the injection, you need to be examined at a hospital emergency department. Epinephrine works quickly, but it also wears off quickly. Delayed reactions can occur. A delayed reaction may be as serious and dangerous as the initial reaction.  HOME  CARE INSTRUCTIONS  · Make sure you and your family know how to give an epinephrine injection.  · Use epinephrine injections as directed by your caregiver. Do not use this medicine more often or in larger doses than prescribed.  · Always carry your epinephrine injection or anaphylaxis kit with you. This can be lifesaving if you have a severe reaction.  · Store the medicine in a cool, dry place. If the medicine becomes discolored or cloudy, dispose of it properly and replace it with new medicine.  · Check the expiration date on your medicine. It may be unsafe to use medicines past their expiration date.  · Tell your caregiver about any other medicines you are taking. Some medicines can react badly with epinephrine.  · Tell your caregiver about any medical conditions you have, such as diabetes, high blood pressure (hypertension), heart disease, irregular heartbeats, or if you are pregnant.  SEEK IMMEDIATE MEDICAL CARE IF:  · You have used an epinephrine injection. Call your local emergency services (911 in [place] Even if you improve after the injection, you need to be examined at a hospital emergency department to make sure your allergic reaction is under control. You will also be monitored for adverse effects from the medicine.  · You have chest pain.  · You have irregular or fast heartbeats.  · You have shortness of breath.  · You have severe headaches.  · You have severe nausea, vomiting, or abdominal cramps.  · You have severe pain, swelling, or redness in the area where you gave the injection.     This information is not intended to replace advice given to you by your health care provider. Make sure you discuss any questions you have with your health care provider.     Document Released: 12/15/2001 Document Revised: 03/11/2013 Document Reviewed: 09/05/2012  [employer]oko Interactive Patient Education ©2016 [employer]    Hives  Hives are itchy, red, puffy (swollen) areas of the skin. Hives can change in size and  location on your body. Hives can come and go for hours, days, or weeks. Hives do not spread from person to person (noncontagious). Scratching, exercise, and stress can make your hives worse.  HOME CARE  · Avoid things that cause your hives (triggers).  · Take antihistamine medicines as told by your doctor. Do not drive while taking an antihistamine.  · Take any other medicines for itching as told by your doctor.  · Wear loose-fitting clothing.  · Keep all doctor visits as told.  GET HELP RIGHT AWAY IF:   · You have a fever.  · Your tongue or lips are puffy.  · You have trouble breathing or swallowing.  · You feel tightness in the throat or chest.  · You have belly (abdominal) pain.  · You have lasting or severe itching that is not helped by medicine.  · You have painful or puffy joints.  These problems may be the first sign of a life-threatening allergic reaction. Call your local emergency services (911 in [place]  MAKE SURE YOU:   · Understand these instructions.  · Will watch your condition.  · Will get help right away if you are not doing well or get worse.     This information is not intended to replace advice given to you by your health care provider. Make sure you discuss any questions you have with your health care provider.     Document Released: 09/26/2009 Document Revised: 06/18/2013 Document Reviewed: 03/12/2013  Elsevier Interactive Patient Education ©2016 [employer]            Patient Information     Patient Information    Following emergency treatment: all patient requiring follow-up care must return either to a private physician or a clinic if your condition worsens before you are able to obtain further medical attention, please return to the emergency room.     Billing Information    At [facility], we work to make the billing process streamlined for our patients.  Our Representatives are here to answer any questions you may have regarding your hospital bill.  If you have insurance coverage and  have supplied your insurance information to us, we will submit a claim to your insurer on your behalf.  Should you have any questions regarding your bill, we can be reached online or by phone as follows:  Online: You are able pay your bills online or live chat with our representatives about any billing questions you may have. We are here to help Monday - Friday from 8:00am to 7:30pm and 9:00am - 12:00pm on Saturdays.  Please visit [email or web address]  for more information.   Phone:  [phone] or [phone]    Please note that your emergency physician, surgeon, pathologist, radiologist, anesthesiologist, and other specialists are not employed by [facility] and will therefore bill separately for their services.  Please contact them directly for any questions concerning their bills at the numbers below:     Emergency Physician Services:  [phone]  [facility]:  [phone]  [facility]:  [phone]  [facility]:  [phone]    1. Your final bill may vary from the amount quoted upon discharge if all procedures are not complete at that time, or if your doctor has additional procedures of which we are not aware. You will receive an additional bill if you return to the Emergency Department at [facility] for suture removal regardless of the facility of which the sutures were placed.     2. Please arrange for settlement of this account at the emergency registration.    3. All self-pay accounts are due in full at the time of treatment.  If you are unable to meet this obligation then payment is expected within 4-5 days.     4. If you have had radiology studies (CT, X-ray, Ultrasound, MRI), you have received a preliminary result during your emergency department visit. Please contact the radiology department [phone] to receive a copy of your final result. Please discuss the Final result with your primary physician or with the  follow up physician provided.     Crisis Hotline:  [place] Crisis Hotline:  4-995-FJRNUXU or 1-895.289.2996  [place] Crisis Hotline:    1-748.504.4244 or 793-379-6404         ED Discharge Follow Up Questions    1. In order to provide you with very good care, we would like to follow up with a phone call in the next few days.  May we have your permission to contact you?     YES /  NO    2. What is the best phone number to call you? (       )_____-__________    3. What is the best time to call you?      Morning  /  Afternoon  /  Evening                   Patient Signature:  ____________________________________________________________    Date:  ____________________________________________________________      Your appointments     Aug 25, 2017  9:00 AM   Established Patient with Maria Fernanda Guzmán M.D.   [facility] [address] [place] [id number] [phone]           You will be receiving a confirmation call a few days before your appointment from our automated call confirmation system.

## 2017-07-14 NOTE — ED PROVIDER NOTES
ED Provider Note    CHIEF COMPLAINT  Chief Complaint   Patient presents with   • Sent from Urgent Care   • Allergic Reaction        HPI  Donny Haas is a 77 y.o. male who presents to the ED with complaints of possible allergic reaction. Patient woke up this morning started having a rash on his legs and migrated to his testicles and penis, then migrated up to his back. Seems to stop from his penis, but then when he went to the urgent care. He just prior to coming. There is. Scratch his chin. It became itchy and swollen in the urgent care was concerned that he might have either an infection or worsening allergic reactions, so sent him to the emergency department for evaluation. Upon arrival, he states that the swelling to his lower chin is actually improving. He did have a little bit of yellowish discharge coming out of the skin. It is not painful in nature is very itchy. Patient denies any shortness of breath, swelling in his neck or any other symptoms.    REVIEW OF SYSTEMS  See HPI for further details. All other systems are negative.     PAST MEDICAL HISTORY  Past Medical History   Diagnosis Date   • Thyroid disease      Hasimotos   • Hyperlipidemia        FAMILY HISTORY  Family History   Problem Relation Age of Onset   • Cancer Mother      breast cancer   • Thyroid Sister      Hasimotos   • Diabetes Sister      Type 2 DM   • Thyroid Brother      Hasimotos   • Diabetes Brother      Type 2 DM   • No Known Problems Maternal Grandmother    • Heart Attack Maternal Grandfather      Patient's family history has been discussed and is been found to be noncontributory to his present illness  SOCIAL HISTORY  Social History     Social History   • Marital Status:      Spouse Name: N/A   • Number of Children: N/A   • Years of Education: N/A     Social History Main Topics   • Smoking status: Former Smoker -- 1.00 packs/day for 19 years     Types: Cigarettes     Quit date: 03/22/1979   • Smokeless tobacco:  "Never Used   • Alcohol Use: 0.0 oz/week     0 Standard drinks or equivalent per week      Comment: 4 drinks a week.   • Drug Use: No   • Sexual Activity:     Partners: Female     Other Topics Concern   • None     Social History Narrative      Maria Fernanda Guzmán M.D.      SURGICAL HISTORY  Past Surgical History   Procedure Laterality Date   • Dental surgery     • Tonsillectomy     • Hernia repair         CURRENT MEDICATIONS  Home Medications     Reviewed by Dawit Trejo R.N. (Registered Nurse) on 07/13/17 at 1727  Med List Status: Complete    Medication Last Dose Status    aspirin EC (ECOTRIN) 81 MG TBEC 7/12/2017 Active    atorvastatin (LIPITOR) 20 MG Tab 7/12/2017 Active    Diclofenac Sodium (VOLTAREN) 1 % GEL Not Taking Active    ibuprofen (MOTRIN) 800 MG Tab prn Active    levothyroxine (SYNTHROID) 100 MCG Tab 7/13/2017 Active    terazosin (HYTRIN) 2 MG Cap 7/12/2017 Active    vitamin D, Ergocalciferol, (DRISDOL) 85100 UNITS CAPS capsule 7/10/2017 Active                ALLERGIES  Allergies   Allergen Reactions   • Penicillins Rash   • Latex        PHYSICAL EXAM  VITAL SIGNS: /79 mmHg  Pulse 83  Temp(Src) 36.6 °C (97.8 °F)  Resp 20  Ht 1.702 m (5' 7.01\")  Wt 102 kg (224 lb 13.9 oz)  BMI 35.21 kg/m2  SpO2 92%   Pulse Oximetry was obtained. It showed a reading of Pulse Oximetry: 92 %.  I interpreted this as not hypoxic.     Constitutional: Well developed, Well nourished, No acute distress, Non-toxic appearance.   HENT: Normocephalic, Atraumatic, Bilateral external ears normal, bilateral tympanic membranes normal, Oropharynx moist, No oral exudates, Nose normal.   Eyes: Pupils are equal round and react to light, extraocular motions are intact, conjunctiva is normal, there are no signs of exudate.   Neck: Supple, no cervical lymphadenopathy, no meningeal signs..   Lymphatic: No lymphadenopathy noted.   Cardiovascular: Regular rate and rhythm without murmurs gallops or rubs.   Thorax & Lungs: Lungs " are clear to auscultation bilaterally, there are no wheezes no rales. Chest wall is nontender.  Abdomen: Soft, nontender nondistended. Bowel sounds are present.   Skin: Patient has hives on his left arm does have positive dermatographia. He also has his lips are swollen under his chin is slightly erythematous tuft to say if it is hives because he has a goatee but the patient is nontender to palpation in the area  Back: No tenderness, No CVA tenderness.      EKG  None    RADIOLOGY/PROCEDURES  None    COURSE & MEDICAL DECISION MAKING  Pertinent Labs & Imaging studies reviewed. (See chart for details)  At this point, I do not think this is cellulitis. I do think that this is hives/allergic reaction. I've given the patient I am injection of Benadryl, by mouth prednisone as well as Pepcid. The patient is improving at this point feel the patient is safe for discharge. I'll give him a prescription for an EpiPen and I discussed the use of this as well as further prednisone. He recommended OTC Benadryl. Follow the primary care physician for possible referral to an allergist.    FINAL IMPRESSION  1. Hives    2. Allergic reaction, initial encounter       The patient will return for new or worsening symptoms and is stable at the time of discharge.    The patient is referred to a primary physician for blood pressure management, diabetic screening, and for all other preventative health concerns.    DISPOSITION:  Patient will be discharged home in stable condition.    FOLLOW UP:  Maria Fernanda Guzmán M.D.  [address] [place] [address] [address] [place] [place] [id number] [phone]    Schedule an appointment as soon as possible for a visit in 1 week  For re-check, Return if any symptoms worsen      OUTPATIENT MEDICATIONS:  New Prescriptions    EPINEPHRINE (EPIPEN) 0.3 MG/0.3ML SOLUTION AUTO-INJECTOR SOLUTION FOR INJECTION    0.3 mL by Intramuscular route Once for 1 dose. Please dispense and EPI-PEN and teach use    PREDNISONE (DELTASONE) 20 MG TAB    Take  2 Tabs by mouth every day for 5 days.             Electronically signed by: Keith García, 7/13/2017 6:06 PM

## 2017-07-14 NOTE — DISCHARGE INSTRUCTIONS
Allergies  An allergy is an abnormal reaction to a substance by the body's defense system (immune system). Allergies can develop at any age.  WHAT CAUSES ALLERGIES?  An allergic reaction happens when the immune system mistakenly reacts to a normally harmless substance, called an allergen, as if it were harmful. The immune system releases antibodies to fight the substance. Antibodies eventually release a chemical called histamine into the bloodstream. The release of histamine is meant to protect the body from infection, but it also causes discomfort.  An allergic reaction can be triggered by:  · Eating an allergen.  · Inhaling an allergen.  · Touching an allergen.  WHAT TYPES OF ALLERGIES ARE THERE?  There are many types of allergies. Common types include:  · Seasonal allergies. People with this type of allergy are usually allergic to substances that are only present during certain seasons, such as molds and pollens.  · Food allergies.  · Drug allergies.  · Insect allergies.  · Animal dander allergies.  WHAT ARE SYMPTOMS OF ALLERGIES?  Possible allergy symptoms include:  · Swelling of the lips, face, tongue, mouth, or throat.  · Sneezing, coughing, or wheezing.  · Nasal congestion.  · Tingling in the mouth.  · Rash.  · Itching.  · Itchy, red, swollen areas of skin (hives).  · Watery eyes.  · Vomiting.  · Diarrhea.  · Dizziness.  · Lightheadedness.  · Fainting.  · Trouble breathing or swallowing.  · Chest tightness.  · Rapid heartbeat.  HOW ARE ALLERGIES DIAGNOSED?  Allergies are diagnosed with a medical and family history and one or more of the following:  · Skin tests.  · Blood tests.  · A food diary. A food diary is a record of all the foods and drinks you have in a day and of all the symptoms you experience.  · The results of an elimination diet. An elimination diet involves eliminating foods from your diet and then adding them back in one by one to find out if a certain food causes an allergic reaction.  HOW ARE  ALLERGIES TREATED?  There is no cure for allergies, but allergic reactions can be treated with medicine. Severe reactions usually need to be treated at a hospital.  HOW CAN REACTIONS BE PREVENTED?  The best way to prevent an allergic reaction is by avoiding the substance you are allergic to. Allergy shots and medicines can also help prevent reactions in some cases. People with severe allergic reactions may be able to prevent a life-threatening reaction called anaphylaxis with a medicine given right after exposure to the allergen.     This information is not intended to replace advice given to you by your health care provider. Make sure you discuss any questions you have with your health care provider.     Document Released: 03/12/2004 Document Revised: 01/08/2016 Document Reviewed: 09/29/2015  [employer] Interactive Patient Education ©2016 [employer]    Epinephrine Injection  Epinephrine is a medicine given by injection to temporarily treat an emergency allergic reaction. It is also used to treat severe asthmatic attacks and other lung problems. The medicine helps to enlarge (dilate) the small breathing tubes of the lungs. A life-threatening, sudden allergic reaction that involves the whole body is called anaphylaxis. Because of potential side effects, epinephrine should only be used as directed by your caregiver.  RISKS AND COMPLICATIONS  Possible side effects of epinephrine injections include:  · Chest pain.  · Irregular or rapid heartbeat.  · Shortness of breath.  · Nausea.  · Vomiting.  · Abdominal pain or cramping.  · Sweating.  · Dizziness.  · Weakness.  · Headache.  · Nervousness.  Report all side effects to your caregiver.  HOW TO GIVE AN EPINEPHRINE INJECTION  Give the epinephrine injection immediately when symptoms of a severe reaction begin. Inject the medicine into the outer thigh or any available, large muscle. Your caregiver can teach you how to do this. You do not need to remove any clothing. After  the injection, call your local emergency services (911 in [place] Even if you improve after the injection, you need to be examined at a hospital emergency department. Epinephrine works quickly, but it also wears off quickly. Delayed reactions can occur. A delayed reaction may be as serious and dangerous as the initial reaction.  HOME CARE INSTRUCTIONS  · Make sure you and your family know how to give an epinephrine injection.  · Use epinephrine injections as directed by your caregiver. Do not use this medicine more often or in larger doses than prescribed.  · Always carry your epinephrine injection or anaphylaxis kit with you. This can be lifesaving if you have a severe reaction.  · Store the medicine in a cool, dry place. If the medicine becomes discolored or cloudy, dispose of it properly and replace it with new medicine.  · Check the expiration date on your medicine. It may be unsafe to use medicines past their expiration date.  · Tell your caregiver about any other medicines you are taking. Some medicines can react badly with epinephrine.  · Tell your caregiver about any medical conditions you have, such as diabetes, high blood pressure (hypertension), heart disease, irregular heartbeats, or if you are pregnant.  SEEK IMMEDIATE MEDICAL CARE IF:  · You have used an epinephrine injection. Call your local emergency services (911 in [place] Even if you improve after the injection, you need to be examined at a hospital emergency department to make sure your allergic reaction is under control. You will also be monitored for adverse effects from the medicine.  · You have chest pain.  · You have irregular or fast heartbeats.  · You have shortness of breath.  · You have severe headaches.  · You have severe nausea, vomiting, or abdominal cramps.  · You have severe pain, swelling, or redness in the area where you gave the injection.     This information is not intended to replace advice given to you by your health care  provider. Make sure you discuss any questions you have with your health care provider.     Document Released: 12/15/2001 Document Revised: 03/11/2013 Document Reviewed: 09/05/2012  [employer] Patient Education ©2016 [employer].    Hives  Hives are itchy, red, puffy (swollen) areas of the skin. Hives can change in size and location on your body. Hives can come and go for hours, days, or weeks. Hives do not spread from person to person (noncontagious). Scratching, exercise, and stress can make your hives worse.  HOME CARE  · Avoid things that cause your hives (triggers).  · Take antihistamine medicines as told by your doctor. Do not drive while taking an antihistamine.  · Take any other medicines for itching as told by your doctor.  · Wear loose-fitting clothing.  · Keep all doctor visits as told.  GET HELP RIGHT AWAY IF:   · You have a fever.  · Your tongue or lips are puffy.  · You have trouble breathing or swallowing.  · You feel tightness in the throat or chest.  · You have belly (abdominal) pain.  · You have lasting or severe itching that is not helped by medicine.  · You have painful or puffy joints.  These problems may be the first sign of a life-threatening allergic reaction. Call your local emergency services (911 in [place]  MAKE SURE YOU:   · Understand these instructions.  · Will watch your condition.  · Will get help right away if you are not doing well or get worse.     This information is not intended to replace advice given to you by your health care provider. Make sure you discuss any questions you have with your health care provider.     Document Released: 09/26/2009 Document Revised: 06/18/2013 Document Reviewed: 03/12/2013  [employer] Patient Education ©2016 [employer].

## 2017-07-14 NOTE — PROGRESS NOTES
ANTICOAGULATION FOLLOW-UP CLINIC VISIT    Patient Name:  Feng Wynne  Date:  5/30/2017  Contact Type:  Telephone/Spoke with patient on the phone. Does [facility]ERE Home Monitor. Provided dosing instruction and recheck date.     SUBJECTIVE:     Patient Findings     Positives Change in diet/appetite    Comments Patient does report eating a little more greens over last week during nice weather.            OBJECTIVE    INR   Date Value Ref Range Status   05/26/2017 2.3  Final       ASSESSMENT / PLAN  INR assessment SUB    Recheck INR In: 1 WEEK    INR Location Home INR      Anticoagulation Summary as of 5/30/2017     INR goal 2.5-3.5   Prior goal 2.5-3.0   Today's INR 2.3! (5/26/2017)   Maintenance plan 10 mg (5 mg x 2) on Mon, Wed, Fri; 5 mg (5 mg x 1) all other days   Full instructions 10 mg on Mon, Wed, Fri; 5 mg all other days   Weekly total 50 mg   No change documented Clara Ybarra RN   Plan last modified Love Elliott RN (5/8/2017)   Next INR check 6/5/2017   Priority INR   Target end date     Indications   Long-term (current) use of anticoagulants [Z79.01] [Z79.01]  Heart valve replaced [Z95.2] [Z95.2]         Anticoagulation Episode Summary     INR check location     Preferred lab     Send INR reminders to [facility] ANTICOAGULATION    Comments       Anticoagulation Care Providers     Provider Role Specialty Phone number    Sandra Walkernorman Granados MD [facility] Internal Medicine [phone]            See the Encounter Report to view Anticoagulation Flowsheet and Dosing Calendar (Go to Encounters tab in chart review, and find the Anticoagulation Therapy Visit)    Dosage adjustment made based on physician directed care plan.  Patient reports eating more greens over last week during nice weather. Patient does report that it was a little more than normal for him. Will keep dose the same for next week. If INR still slightly sub therapeutic, then will made slight increase to dose. Patient does ALERE Home  Subjective:      Pt is a 77 y.o. male who presents with Allergic Reaction and Ear Injury            Allergic Reaction  This is a new problem. The current episode started today. The problem occurs constantly. The problem has been gradually worsening. The symptoms are aggravated by exertion. He has tried ice and rest for the symptoms. The treatment provided no relief.   Ear Injury  This is a new problem. The current episode started in the past 7 days. The problem occurs constantly. The problem has been unchanged. Nothing aggravates the symptoms. Treatments tried: antibiotics. The treatment provided mild relief.   Pt notes he awoke this morning with a rash about his upper thighs and groin and states it was very itchy and painful and later it began to resolve and moved to his arms B/L and now it is on his face causing swelling of his lips, face, mouth and causing difficulty speaking but notes that it has mildly improved in the last hour. He states he was treated for B/L otitis media and now notes B/L muffled hearing. Pt has not taken any Rx medications for this condition. Pt denies new detergents, soaps, make-up, hygiene products, medications, foods, exposure to chemicals.  Pt states the pain is a 5/10 with facial swelling, aching in nature and worse right now due to facial swelling. Pt denies CP, SOB, NVD, paresthesias, headaches, dizziness, change in vision,  or other joint pain. The pt's medication list, problem list, and allergies have been evaluated and reviewed during today's visit.    PMH:  Past Medical History   Diagnosis Date   • Thyroid disease      Hasimotos   • Hyperlipidemia        PSH:  Past Surgical History   Procedure Laterality Date   • Dental surgery     • Tonsillectomy     • Hernia repair         Fam Hx:    family history includes Cancer in his mother; Diabetes in his brother and sister; Heart Attack in his maternal grandfather; No Known Problems in his maternal grandmother; Thyroid in his brother and  Monitoring.    Clara Ybarra RN                  sister.  Family Status   Relation Status Death Age   • Mother  58   • Father  86   • Sister Alive    • Brother Alive    • Maternal Grandmother     • Maternal Grandfather     • Paternal Grandmother     • Paternal Grandfather         Soc HX:  Social History     Social History   • Marital Status:      Spouse Name: N/A   • Number of Children: N/A   • Years of Education: N/A     Occupational History   • Not on file.     Social History Main Topics   • Smoking status: Former Smoker -- 1.00 packs/day for 19 years     Types: Cigarettes     Quit date: 1979   • Smokeless tobacco: Never Used   • Alcohol Use: 0.0 oz/week     0 Standard drinks or equivalent per week      Comment: 4 drinks a week.   • Drug Use: No   • Sexual Activity:     Partners: Female     Other Topics Concern   • Not on file     Social History Narrative         Medications:    Current outpatient prescriptions:   •  atorvastatin (LIPITOR) 20 MG Tab, TAKE 1 TABLET BY MOUTH EVERY DAY, Disp: 30 Tab, Rfl: 0  •  ibuprofen (MOTRIN) 800 MG Tab, Take 1 Tab by mouth 2 times a day as needed., Disp: 30 Tab, Rfl: 0  •  levothyroxine (SYNTHROID) 100 MCG Tab, Take one tab po q24h, Disp: 90 Tab, Rfl: 1  •  terazosin (HYTRIN) 2 MG Cap, Take one tab po q24h, Disp: 90 Cap, Rfl: 0  •  vitamin D, Ergocalciferol, (DRISDOL) 83330 UNITS CAPS capsule, Take 1 Cap by mouth every 7 days., Disp: 8 Cap, Rfl: 0  •  aspirin EC (ECOTRIN) 81 MG TBEC, Take 81 mg by mouth every day., Disp: , Rfl:   •  predniSONE (DELTASONE) 20 MG Tab, Take 2 Tabs by mouth every day for 5 days., Disp: 10 Tab, Rfl: 0  •  Diclofenac Sodium (VOLTAREN) 1 % GEL, Apply 2 gm to effected fingers and heel qid prn. (Patient not taking: Reported on 2017), Disp: 1 Tube, Rfl: 3      Allergies:  Penicillins and Latex        ROS  Constitutional: Negative for fever, chills and malaise/fatigue.   HENT: Negative for congestion and sore throat.    Eyes: Negative for  "blurred vision, double vision and photophobia.   Respiratory: Negative for cough and shortness of breath.    Cardiovascular: Negative for chest pain and palpitations.   Gastrointestinal: Negative for heartburn, nausea, vomiting, abdominal pain, diarrhea and constipation.   Genitourinary: Negative for dysuria and flank pain.   Musculoskeletal: Negative for joint pain and myalgias.   Skin: POS for allergic reaction causing itching and rash to arms and face.   Neurological: Negative for dizziness, tingling and headaches.   Endo/Heme/Allergies: Does not bruise/bleed easily.   Psychiatric/Behavioral: Negative for depression. The patient is not nervous/anxious.           Objective:     /78 mmHg  Pulse 88  Temp(Src) 36.3 °C (97.3 °F)  Ht 1.702 m (5' 7.01\")  Wt 102.059 kg (225 lb)  BMI 35.23 kg/m2  SpO2 93%     Physical Exam   Skin: Skin is warm. Rash noted. No purpura noted. Rash is papular and urticarial. Rash is not macular, not maculopapular, not nodular, not pustular and not vesicular. He is not diaphoretic. There is erythema. No pallor.               Constitutional: PT is oriented to person, place, and time. PT appears well-developed and well-nourished. No distress.   HENT:   Head: Normocephalic and atraumatic.   Mouth/Throat: Oropharynx is clear and moist. No oropharyngeal exudate.   Eyes: Conjunctivae normal and EOM are normal. Pupils are equal, round, and reactive to light.   Neck: Normal range of motion. Neck supple. No thyromegaly present.   Cardiovascular: Normal rate, regular rhythm, normal heart sounds and intact distal pulses.  Exam reveals no gallop and no friction rub.    No murmur heard.  Pulmonary/Chest: Effort normal and breath sounds normal. No respiratory distress. PT has no wheezes. PT has no rales. Pt exhibits no tenderness.   Abdominal: Soft. Bowel sounds are normal. PT exhibits no distension and no mass. There is no tenderness. There is no rebound and no guarding.   Musculoskeletal: " Normal range of motion. PT exhibits no edema and no tenderness.   Neurological: PT is alert and oriented to person, place, and time. PT has normal reflexes. No cranial nerve deficit.       Psychiatric: PT has a normal mood and affect. PT behavior is normal. Judgment and thought content normal.        Assessment/Plan:     1. Angioedema, initial encounter      2. Urticaria      3. Difficulty speaking      Concern for progressing severe allergic reaction with current angioedema and tongue swelling.  TO [facility] NOW FOR  further evaluation. Spoke with Dr. García on the phone, attending at the ER , and he graciously accepted transfer of care for further imaging and evaluation of the pt.  Pt declines EMS and wishes wife to drive the mile to [facility] ER  Rest, fluids encouraged.  AVS with medical info given.  Pt was in full understanding and agreement with the plan.

## 2017-07-19 ENCOUNTER — TELEPHONE (OUTPATIENT)
Dept: MEDICAL GROUP | Age: 77
End: 2017-07-19

## 2017-07-19 NOTE — TELEPHONE ENCOUNTER
ESTABLISHED PATIENT PRE-VISIT PLANNING     Note: Patient will not be contacted if there is no indication to call.     1.  Reviewed notes from the last few office visits within the medical group: Yes    2.  If any orders were placed at last visit or intended to be done for this visit (i.e. 6 mos follow-up), do we have Results/Consult Notes?        •  Labs - Labs ordered, completed and results are in chart.       •  Imaging - Imaging was not ordered at last office visit.       •  Referrals - No referrals were ordered at last office visit.    3. Is this appointment scheduled as a Hospital Follow-Up? Yes, visit was at [facility].     4.  Immunizations were updated in Epic using WebIZ?: Epic matches WebIZ       •  Web Iz Recommendations: FLU, HEPATITIS A  and TD    5.  Patient is due for the following Health Maintenance Topics:   Health Maintenance Due   Topic Date Due   • Annual Wellness Visit  1940           6.  Patient was NOT informed to arrive 15 min prior to their scheduled appointment and bring in their medication bottles.

## 2017-07-20 ENCOUNTER — OFFICE VISIT (OUTPATIENT)
Dept: MEDICAL GROUP | Age: 77
End: 2017-07-20
Payer: MEDICARE

## 2017-07-20 VITALS
OXYGEN SATURATION: 99 % | SYSTOLIC BLOOD PRESSURE: 140 MMHG | BODY MASS INDEX: 35.6 KG/M2 | TEMPERATURE: 97.9 F | HEART RATE: 71 BPM | WEIGHT: 226.8 LBS | HEIGHT: 67 IN | DIASTOLIC BLOOD PRESSURE: 84 MMHG

## 2017-07-20 DIAGNOSIS — T78.3XXD ANGIOEDEMA OF LIPS, SUBSEQUENT ENCOUNTER: ICD-10-CM

## 2017-07-20 PROBLEM — T78.3XXA ANGIOEDEMA OF LIPS: Status: ACTIVE | Noted: 2017-07-20

## 2017-07-20 PROCEDURE — 99214 OFFICE O/P EST MOD 30 MIN: CPT | Performed by: FAMILY MEDICINE

## 2017-07-20 NOTE — PROGRESS NOTES
This medical record contains text that has been entered with the assistance of computer voice recognition and dictation software.  Therefore, it may contain unintended errors in text, spelling, punctuation, or grammar    Chief Complaint   Patient presents with   • Other     see reason for visit       Donny Haas is a 77 y.o. male here evaluation and management of: Follow-up on allergic reaction      HPI:     Angioedema of lips    He does not know what could have triggered it  At 7 am on July 13, 2017 he began to have a pruritic rash on bilateral inguinal region, penis  This spread to back of his knees, this was red pinpoint papules  Then became coin size on bilateral arms  Then lower lip started swelling, then upper lip  This led to difficulty speaking  He was seen in UC, given prednisone , benadryl,     He believes it was due to eating hot peppers  He did have Azithromycin on June 22, for persistent otitis media    This happened before   states that he washed his wifes back with presurgical soap,  Prior to her TKR left side        Current medicines (including changes today)  Current Outpatient Prescriptions   Medication Sig Dispense Refill   • atorvastatin (LIPITOR) 20 MG Tab TAKE 1 TABLET BY MOUTH EVERY DAY 30 Tab 0   • levothyroxine (SYNTHROID) 100 MCG Tab Take one tab po q24h 90 Tab 1   • terazosin (HYTRIN) 2 MG Cap Take one tab po q24h 90 Cap 0   • vitamin D, Ergocalciferol, (DRISDOL) 70144 UNITS CAPS capsule Take 1 Cap by mouth every 7 days. 8 Cap 0   • aspirin EC (ECOTRIN) 81 MG TBEC Take 81 mg by mouth every day.     • ibuprofen (MOTRIN) 800 MG Tab Take 1 Tab by mouth 2 times a day as needed. 30 Tab 0   • Diclofenac Sodium (VOLTAREN) 1 % GEL Apply 2 gm to effected fingers and heel qid prn. (Patient not taking: Reported on 5/22/2017) 1 Tube 3     No current facility-administered medications for this visit.     He  has a past medical history of Thyroid disease and Hyperlipidemia.  He  has past  "surgical history that includes dental surgery; tonsillectomy; and hernia repair.  Social History   Substance Use Topics   • Smoking status: Former Smoker -- 1.00 packs/day for 19 years     Types: Cigarettes     Quit date: 1979   • Smokeless tobacco: Never Used   • Alcohol Use: 0.0 oz/week     0 Standard drinks or equivalent per week      Comment: 4 drinks a week.     Social History     Social History Narrative     Family History   Problem Relation Age of Onset   • Cancer Mother      breast cancer   • Thyroid Sister      Hasimotos   • Diabetes Sister      Type 2 DM   • Thyroid Brother      Hasimotos   • Diabetes Brother      Type 2 DM   • No Known Problems Maternal Grandmother    • Heart Attack Maternal Grandfather      Family Status   Relation Status Death Age   • Mother  58   • Father  86   • Sister Alive    • Brother Alive    • Maternal Grandmother     • Maternal Grandfather     • Paternal Grandmother     • Paternal Grandfather           ROS    Please see hpi     All other systems reviewed and are negative     Objective:     Blood pressure 140/84, pulse 71, temperature 36.6 °C (97.9 °F), height 1.702 m (5' 7.01\"), weight 102.876 kg (226 lb 12.8 oz), SpO2 99 %. Body mass index is 35.51 kg/(m^2).  Physical Exam:    Constitutional: Alert, no distress.  Skin: Warm, dry, good turgor, no rashes in visible areas.  Eye: Equal, round and reactive, conjunctiva clear, lids normal.  ENMT: Lips without lesions, good dentition, oropharynx clear.  Neck: Trachea midline, no masses, no thyromegaly. No cervical or supraclavicular lymphadenopathy.  Respiratory: Unlabored respiratory effort, lungs clear to auscultation, no wheezes, no ronchi.  Cardiovascular: Normal S1, S2, no murmur, no edema.  Abdomen: Soft, non-tender, no masses, no hepatosplenomegaly.  Psych: Alert and oriented x3, normal affect and mood.          Assessment and Plan:   The following treatment plan was " discussed      1. Angioedema of lips, subsequent encounter  Recommended that he fill the rx for the Epi-pen  Keep benadryl, zyrtec, and prednisone at home  Establish care in allergy  No respiratory concerns  The reaction has resolved    - REFERRAL TO ALLERGY      HEALTH MAINTENANCE: due for AWV    Instructed to Follow up in clinic or ER for worsening symptoms, difficulty breathing, lack of expected recovery, or should new symptoms or problems arise.    Followup: Return in about 4 weeks (around 8/17/2017) for Reevaluation.       Once again this medical record contains text that has been entered with the assistance of computer voice recognition and dictation software.  Therefore, it may contain unintended errors in text, spelling, punctuation, or grammar

## 2017-07-20 NOTE — MR AVS SNAPSHOT
"Donny Haas   2017 10:20 AM   Office Visit   MRN: [id number]    Department:  [address] [place] [address]   Dept Phone:  [phone]    Description:  Male : 1940   Provider:  Maria Fernanda Guzmán M.D.           Reason for Visit     Other see reason for visit      Allergies as of 2017     Allergen Noted Reactions    Penicillins 2011   Rash    Latex 2011         You were diagnosed with     Angioedema of lips, subsequent encounter   [817330]         Vital Signs     Blood Pressure Pulse Temperature Height Weight Body Mass Index    140/84 mmHg 71 36.6 °C (97.9 °F) 1.702 m (5' 7.01\") 102.876 kg (226 lb 12.8 oz) 35.51 kg/m2    Oxygen Saturation Smoking Status                99% Former Smoker          Basic Information     Date Of Birth Sex Race Ethnicity Preferred Language    1940 Male White Non- English      Your appointments     Aug 25, 2017  9:00 AM   Established Patient with Maria Fernanda Guzmán M.D.   [address] [id number] [phone]           You will be receiving a confirmation call a few days before your appointment from our automated call confirmation system.              Problem List              ICD-10-CM Priority Class Noted - Resolved    Hashimoto's thyroiditis E06.3   2011 - Present    Hyperlipidemia with target LDL less than 100 E78.5   2011 - Present    Other chest pain R07.89   2012 - Present    Sleep apnea G47.30   2012 - Present    Lumbar disc disease M51.9   2012 - Present    Chronic renal impairment, stage 1 N18.1   2017 - Present    Acquired hypothyroidism E03.9   2017 - Present    Preventative health care Z00.00   2017 - Present    Obesity (BMI 30-39.9) E66.9   2017 - Present    Acute right otitis media H66.91   2017 - Present    Obesity (BMI 35.0-39.9 without comorbidity) (HCC) E66.9   2017 - Present   " Angioedema of lips T78.3XXA   7/20/2017 - Present      Health Maintenance        Date Due Completion Dates    IMM INFLUENZA (1) 9/1/2017 ---    IMM DTaP/Tdap/Td Vaccine (2 - Td) 1/22/2025 1/22/2015    COLONOSCOPY 1/23/2025 1/23/2015            Current Immunizations     13-VALENT PCV PREVNAR 1/22/2015    Pneumococcal polysaccharide vaccine (PPSV-23) 5/25/2017    SHINGLES VACCINE 1/22/2015    Tdap Vaccine 1/22/2015      Below and/or attached are the medications your provider expects you to take. Review all of your home medications and newly ordered medications with your provider and/or pharmacist. Follow medication instructions as directed by your provider and/or pharmacist. Please keep your medication list with you and share with your provider. Update the information when medications are discontinued, doses are changed, or new medications (including over-the-counter products) are added; and carry medication information at all times in the event of emergency situations     Allergies:  PENICILLINS - Rash     LATEX - (reactions not documented)               Medications  Valid as of: July 20, 2017 - 12:39 PM    Generic Name Brand Name Tablet Size Instructions for use    Aspirin (Tablet Delayed Response) ECOTRIN 81 MG Take 81 mg by mouth every day.        Atorvastatin Calcium (Tab) LIPITOR 20 MG TAKE 1 TABLET BY MOUTH EVERY DAY        Diclofenac Sodium (Gel) Diclofenac Sodium 1 % Apply 2 gm to effected fingers and heel qid prn.        Ergocalciferol (Cap) DRISDOL 08796 UNITS Take 1 Cap by mouth every 7 days.        Ibuprofen (Tab) MOTRIN 800 MG Take 1 Tab by mouth 2 times a day as needed.        Levothyroxine Sodium (Tab) SYNTHROID 100 MCG Take one tab po q24h        Terazosin HCl (Cap) HYTRIN 2 MG Take one tab po q24h        .                 Medicines prescribed today were sent to:     [id number] - [place] - [address] [facility] AT [facility]    [address] [facility]  [address]    Phone: [phone] Fax: [phone]    Open 24 Hours?: No      Medication refill instructions:       If your prescription bottle indicates you have medication refills left, it is not necessary to call your provider’s office. Please contact your pharmacy and they will refill your medication.    If your prescription bottle indicates you do not have any refills left, you may request refills at any time through one of the following ways: The online [employer] system (except Urgent Care), by calling your provider’s office, or by asking your pharmacy to contact your provider’s office with a refill request. Medication refills are processed only during regular business hours and may not be available until the next business day. Your provider may request additional information or to have a follow-up visit with you prior to refilling your medication.   *Please Note: Medication refills are assigned a new Rx number when refilled electronically. Your pharmacy may indicate that no refills were authorized even though a new prescription for the same medication is available at the pharmacy. Please request the medicine by name with the pharmacy before contacting your provider for a refill.        Referral     A referral request has been sent to our patient care coordination department. Please allow 3-5 business days for us to process this request and contact you either by phone or mail. If you do not hear from us by the 5th business day, please call us at [phone].           [employer] Access Code: Activation code not generated  Current [employer] Status: Active

## 2017-07-20 NOTE — ASSESSMENT & PLAN NOTE
He does not know what could have triggered it  At 7 am on July 13, 2017 he began to have a pruritic rash on bilateral inguinal region, penis  This spread to back of his knees, this was red pinpoint papules  Then became coin size on bilateral arms  Then lower lip started swelling, then upper lip  This led to difficulty speaking  He was seen in UC, given prednisone , benadryl,     He believes it was due to eating hot peppers  He did have Azithromycin on June 22, for persistent otitis media    This happened before   states that he washed his wifes back with presurgical soap,  Prior to her TKR left side

## 2017-08-01 RX ORDER — ATORVASTATIN CALCIUM 20 MG/1
TABLET, FILM COATED ORAL
Qty: 30 TAB | Refills: 0 | Status: SHIPPED | OUTPATIENT
Start: 2017-08-01 | End: 2017-08-30 | Stop reason: SDUPTHER

## 2017-08-24 ENCOUNTER — TELEPHONE (OUTPATIENT)
Dept: MEDICAL GROUP | Age: 77
End: 2017-08-24

## 2017-08-24 NOTE — TELEPHONE ENCOUNTER
ESTABLISHED PATIENT PRE-VISIT PLANNING     Note: Patient will not be contacted if there is no indication to call.     1.  Reviewed notes from the last few office visits within the medical group: Yes    2.  If any orders were placed at last visit or intended to be done for this visit (i.e. 6 mos follow-up), do we have Results/Consult Notes?        •  Labs - Labs were not ordered at last office visit.       •  Imaging - Imaging was not ordered at last office visit.       •  Referrals - Referral ordered, patient was seen and consult notes are in chart. Care Teams updated  YES.    3. Is this appointment scheduled as a Hospital Follow-Up? No    4.  Immunizations were updated in Epic using WebIZ?: Epic matches WebIZ       •  Web Iz Recommendations: FLU and TD    5.  Patient is due for the following Health Maintenance Topics:   Health Maintenance Due   Topic Date Due   • Annual Wellness Visit  1940   • IMM INFLUENZA (1) 09/01/2017       - Patient is up-to-date on all Health Maintenance topics. No records have been requested at this time.    6.  Patient was NOT informed to arrive 15 min prior to their scheduled appointment and bring in their medication bottles.

## 2017-08-25 ENCOUNTER — OFFICE VISIT (OUTPATIENT)
Dept: MEDICAL GROUP | Age: 77
End: 2017-08-25
Payer: MEDICARE

## 2017-08-25 VITALS
SYSTOLIC BLOOD PRESSURE: 134 MMHG | DIASTOLIC BLOOD PRESSURE: 80 MMHG | TEMPERATURE: 98.1 F | WEIGHT: 229.4 LBS | HEIGHT: 67 IN | BODY MASS INDEX: 36 KG/M2 | HEART RATE: 88 BPM | OXYGEN SATURATION: 92 %

## 2017-08-25 DIAGNOSIS — E78.5 HYPERLIPIDEMIA WITH TARGET LDL LESS THAN 100: ICD-10-CM

## 2017-08-25 DIAGNOSIS — L57.0 AK (ACTINIC KERATOSIS): ICD-10-CM

## 2017-08-25 DIAGNOSIS — L82.0 INFLAMED SEBORRHEIC KERATOSIS: ICD-10-CM

## 2017-08-25 DIAGNOSIS — E66.9 OBESITY (BMI 30-39.9): ICD-10-CM

## 2017-08-25 DIAGNOSIS — N40.0 BENIGN NODULAR PROSTATIC HYPERPLASIA WITHOUT LOWER URINARY TRACT SYMPTOMS: ICD-10-CM

## 2017-08-25 DIAGNOSIS — G47.30 SLEEP APNEA, UNSPECIFIED TYPE: ICD-10-CM

## 2017-08-25 PROCEDURE — 17110 DESTRUCTION B9 LES UP TO 14: CPT | Performed by: FAMILY MEDICINE

## 2017-08-25 PROCEDURE — 17000 DESTRUCT PREMALG LESION: CPT | Mod: 59 | Performed by: FAMILY MEDICINE

## 2017-08-25 PROCEDURE — 99214 OFFICE O/P EST MOD 30 MIN: CPT | Mod: 25 | Performed by: FAMILY MEDICINE

## 2017-08-25 PROCEDURE — 17003 DESTRUCT PREMALG LES 2-14: CPT | Performed by: FAMILY MEDICINE

## 2017-08-25 NOTE — ASSESSMENT & PLAN NOTE
Patient has been complaining that these oval lesions have been irritating, flaky, causing discomfort

## 2017-08-25 NOTE — PROGRESS NOTES
This medical record contains text that has been entered with the assistance of computer voice recognition and dictation software.  Therefore, it may contain unintended errors in text, spelling, punctuation, or grammar    Chief Complaint   Patient presents with   • Other     see reason for visit       Donny Nicole is a 77 y.o. male here evaluation and management of: routine f/u,  BENJA, HLD, ISK, AK      HPI:     Sleep apnea  Continues to use CPAP  He states that he has no difficulty with the mask  He also uses it for the daytime naps      Hyperlipidemia with target LDL less than 100  Patient continues tolerating atorvastatin 20 mg by mouth daily at bedtime.  The patient has been on a statin for years and tolerating this fine. The patient denies any muscle aches, no abdominal pain and no history of elevated liver enzymes.  Results for DONNY NICOLE (MRN [id number]) as of 8/25/2017 10:49   Ref. Range 5/31/2017 10:37   Cholesterol,Tot Latest Ref Range: 100-199 mg/dL 166   Triglycerides Latest Ref Range: 0-149 mg/dL 104   HDL Latest Ref Range: >=40 mg/dL 56   LDL Latest Ref Range: <100 mg/dL 89       Inflamed seborrheic keratosis  Patient has been complaining that these oval lesions have been irritating, flaky, causing discomfort      Current medicines (including changes today)  Current Outpatient Prescriptions   Medication Sig Dispense Refill   • atorvastatin (LIPITOR) 20 MG Tab TAKE 1 TABLET BY MOUTH EVERY DAY 30 Tab 0   • levothyroxine (SYNTHROID) 100 MCG Tab Take one tab po q24h 90 Tab 1   • terazosin (HYTRIN) 2 MG Cap Take one tab po q24h 90 Cap 0   • vitamin D, Ergocalciferol, (DRISDOL) 35387 UNITS CAPS capsule Take 1 Cap by mouth every 7 days. 8 Cap 0   • aspirin EC (ECOTRIN) 81 MG TBEC Take 81 mg by mouth every day.     • ibuprofen (MOTRIN) 800 MG Tab Take 1 Tab by mouth 2 times a day as needed. 30 Tab 0   • Diclofenac Sodium (VOLTAREN) 1 % GEL Apply 2 gm to effected fingers and heel  "qid prn. (Patient not taking: Reported on 2017) 1 Tube 3     No current facility-administered medications for this visit.     He  has a past medical history of Thyroid disease and Hyperlipidemia.  He  has past surgical history that includes dental surgery; tonsillectomy; and hernia repair.  Social History   Substance Use Topics   • Smoking status: Former Smoker -- 1.00 packs/day for 19 years     Types: Cigarettes     Quit date: 1979   • Smokeless tobacco: Never Used   • Alcohol Use: 0.0 oz/week     0 Standard drinks or equivalent per week      Comment: 4 drinks a week.     Social History     Social History Narrative     Family History   Problem Relation Age of Onset   • Cancer Mother      breast cancer   • Thyroid Sister      Hasimotos   • Diabetes Sister      Type 2 DM   • Thyroid Brother      Hasimotos   • Diabetes Brother      Type 2 DM   • No Known Problems Maternal Grandmother    • Heart Attack Maternal Grandfather      Family Status   Relation Status Death Age   • Mother  58   • Father  86   • Sister Alive    • Brother Alive    • Maternal Grandmother     • Maternal Grandfather     • Paternal Grandmother     • Paternal Grandfather           ROS    Please see hpi     All other systems reviewed and are negative     Objective:     Blood pressure 134/80, pulse 88, temperature 36.7 °C (98.1 °F), height 1.702 m (5' 7.01\"), weight 104.055 kg (229 lb 6.4 oz), SpO2 92 %. Body mass index is 35.92 kg/(m^2).  Physical Exam:      SKIN EXAM  Several well-demarcated, round/oval lesions with a dull, verrucous surface and a typical stuck-on appearance on back and chest    multiple lesions on bilateral forearms, hands, and chest, with evidence of of solar damage present , spotty hyperpigmentation, scattered telangiectasias, and  Xerosis      PROCEDURE: CRYOTHERAPY  Discussed risks and benefits of cryotherapy. Patient verbally agreed. 2  applications of cryotherapy were " applied to all lesions 4 AK's and  3 SK's. Patient tolerated procedure well. Aftercare instructions given.      Eye: Equal, round and reactive, conjunctiva clear, lids normal.  ENMT: Lips without lesions, good dentition, oropharynx clear.  Neck: Trachea midline, no masses, no thyromegaly. No cervical or supraclavicular lymphadenopathy.  Respiratory: Unlabored respiratory effort, lungs clear to auscultation, no wheezes, no ronchi.  Cardiovascular: Normal S1, S2, no murmur, no edema.  Abdomen: Soft, non-tender, no masses, no hepatosplenomegaly.  Psych: Alert and oriented x3, normal affect and mood.          Assessment and Plan:   The following treatment plan was discussed      1. Obesity (BMI 30-39.9)    We discussed agressive lifestyle modifications with weight loss, aerobic exercise, and eating a prudent diet, which  included avoiding fried foods, using low-fat milk and avoiding simple carbohydrate, making a food diary. If you can't run because of pain do the stationary bike/elipticle or swim 30minutes 3 times per wk, in the beginning and then gradually increase.      2. Sleep apnea, unspecified type  Patient has been stable with current management  We will make no changes for now      3. Benign nodular prostatic hyperplasia without lower urinary tract symptoms    - PROSTATE SPECIFIC AG DIAGNOSTIC; Future    4. Hyperlipidemia with target LDL less than 100  Patient has been stable with current management  We will make no changes for now      5. Inflamed seborrheic keratosis  Patient tollerrated procedure well  There were no adverse events  Patient was given post procedure precautions       6. AK (actinic keratosis)  Patient tollerrated procedure well  There were no adverse events  Patient was given post procedure precautions     7. NUB  We noticed several atypical macules on a routine skin exam.     I offered to remove lesions today  Patient wants to return for punch excision          HEALTH MAINTENANCE: due for  AWV    Instructed to Follow up in clinic or ER for worsening symptoms, difficulty breathing, lack of expected recovery, or should new symptoms or problems arise.    Followup: Return in about 4 weeks (around 9/22/2017) for punch biopsy, Short.       Once again this medical record contains text that has been entered with the assistance of computer voice recognition and dictation software.  Therefore, it may contain unintended errors in text, spelling, punctuation, or grammar

## 2017-08-25 NOTE — ASSESSMENT & PLAN NOTE
Continues to use CPAP  He states that he has no difficulty with the mask  He also uses it for the daytime naps

## 2017-08-25 NOTE — MR AVS SNAPSHOT
"        Donny Haas   2017 9:00 AM   Office Visit   MRN: [id number]    Department:  [address] [place] [address]   Dept Phone:  [phone]    Description:  Male : 1940   Provider:  Maria Fernanda Guzmán M.D.           Reason for Visit     Other see reason for visit      Allergies as of 2017     Allergen Noted Reactions    Penicillins 2011   Rash    Latex 2011         You were diagnosed with     Obesity (BMI 30-39.9)   [499222]       Sleep apnea, unspecified type   [7841625]       Benign nodular prostatic hyperplasia without lower urinary tract symptoms   [6578219]       Hyperlipidemia with target LDL less than 100   [105477]       Inflamed seborrheic keratosis   [702.11.ICD-9-CM]         Vital Signs     Blood Pressure Pulse Temperature Height Weight Body Mass Index    134/80 mmHg 88 36.7 °C (98.1 °F) 1.702 m (5' 7.01\") 104.055 kg (229 lb 6.4 oz) 35.92 kg/m2    Oxygen Saturation Smoking Status                92% Former Smoker          Basic Information     Date Of Birth Sex Race Ethnicity Preferred Language    1940 Male White Non- English      Your appointments     Sep 08, 2017  8:20 AM   Established Patient with Maria Fernanda Guzmán M.D.   [address] [id number] [phone]           You will be receiving a confirmation call a few days before your appointment from our automated call confirmation system.              Problem List              ICD-10-CM Priority Class Noted - Resolved    Hashimoto's thyroiditis E06.3   2011 - Present    Hyperlipidemia with target LDL less than 100 E78.5   2011 - Present    Other chest pain R07.89   2012 - Present    Sleep apnea G47.30   2012 - Present    Lumbar disc disease M51.9   2012 - Present    Chronic renal impairment, stage 1 N18.1   2017 - Present    Acquired hypothyroidism E03.9   2017 - Present    Preventative " health care Z00.00   5/25/2017 - Present    Obesity (BMI 30-39.9) E66.9   5/25/2017 - Present    Acute right otitis media H66.91   6/22/2017 - Present    Obesity (BMI 35.0-39.9 without comorbidity) (HCC) E66.9   6/22/2017 - Present    Angioedema of lips T78.3XXA   7/20/2017 - Present    Benign nodular prostatic hyperplasia N40.0   8/25/2017 - Present    Inflamed seborrheic keratosis L82.0   8/25/2017 - Present      Health Maintenance        Date Due Completion Dates    IMM INFLUENZA (1) 9/1/2017 ---    IMM DTaP/Tdap/Td Vaccine (2 - Td) 1/22/2025 1/22/2015    COLONOSCOPY 1/23/2025 1/23/2015            Current Immunizations     13-VALENT PCV PREVNAR 1/22/2015    Pneumococcal polysaccharide vaccine (PPSV-23) 5/25/2017    SHINGLES VACCINE 1/22/2015    Tdap Vaccine 1/22/2015      Below and/or attached are the medications your provider expects you to take. Review all of your home medications and newly ordered medications with your provider and/or pharmacist. Follow medication instructions as directed by your provider and/or pharmacist. Please keep your medication list with you and share with your provider. Update the information when medications are discontinued, doses are changed, or new medications (including over-the-counter products) are added; and carry medication information at all times in the event of emergency situations     Allergies:  PENICILLINS - Rash     LATEX - (reactions not documented)               Medications  Valid as of: August 25, 2017 - 10:07 AM    Generic Name Brand Name Tablet Size Instructions for use    Aspirin (Tablet Delayed Response) ECOTRIN 81 MG Take 81 mg by mouth every day.        Atorvastatin Calcium (Tab) LIPITOR 20 MG TAKE 1 TABLET BY MOUTH EVERY DAY        Diclofenac Sodium (Gel) Diclofenac Sodium 1 % Apply 2 gm to effected fingers and heel qid prn.        Ergocalciferol (Cap) DRISDOL 05224 units Take 1 Cap by mouth every 7 days.        Ibuprofen (Tab) MOTRIN 800 MG Take 1 Tab by  mouth 2 times a day as needed.        Levothyroxine Sodium (Tab) SYNTHROID 100 MCG Take one tab po q24h        Terazosin HCl (Cap) HYTRIN 2 MG Take one tab po q24h        .                 Medicines prescribed today were sent to:     [facility] [id number] [place] [address], [place] - [address] [facility] AT [facility] & [facility]    [address] [facility] [place] [address]    Phone: [phone] Fax: [phone]    Open 24 Hours?: No      Medication refill instructions:       If your prescription bottle indicates you have medication refills left, it is not necessary to call your provider’s office. Please contact your pharmacy and they will refill your medication.    If your prescription bottle indicates you do not have any refills left, you may request refills at any time through one of the following ways: The online [employer] system (except Urgent Care), by calling your provider’s office, or by asking your pharmacy to contact your provider’s office with a refill request. Medication refills are processed only during regular business hours and may not be available until the next business day. Your provider may request additional information or to have a follow-up visit with you prior to refilling your medication.   *Please Note: Medication refills are assigned a new Rx number when refilled electronically. Your pharmacy may indicate that no refills were authorized even though a new prescription for the same medication is available at the pharmacy. Please request the medicine by name with the pharmacy before contacting your provider for a refill.        Your To Do List     Future Labs/Procedures Complete By Expires    PROSTATE SPECIFIC AG DIAGNOSTIC  As directed 8/25/2018         [employer] Access Code: Activation code not generated  Current [employer] Status: Active

## 2017-08-25 NOTE — ASSESSMENT & PLAN NOTE
Patient continues tolerating atorvastatin 20 mg by mouth daily at bedtime.  The patient has been on a statin for years and tolerating this fine. The patient denies any muscle aches, no abdominal pain and no history of elevated liver enzymes.  Results for GUERRERO NICOLE (MRN [id number]) as of 8/25/2017 10:49   Ref. Range 5/31/2017 10:37   Cholesterol,Tot Latest Ref Range: 100-199 mg/dL 166   Triglycerides Latest Ref Range: 0-149 mg/dL 104   HDL Latest Ref Range: >=40 mg/dL 56   LDL Latest Ref Range: <100 mg/dL 89

## 2017-08-30 RX ORDER — ATORVASTATIN CALCIUM 20 MG/1
TABLET, FILM COATED ORAL
Qty: 30 TAB | Refills: 0 | Status: SHIPPED | OUTPATIENT
Start: 2017-08-30 | End: 2017-09-30 | Stop reason: SDUPTHER

## 2017-09-22 ENCOUNTER — PATIENT MESSAGE (OUTPATIENT)
Dept: MEDICAL GROUP | Age: 77
End: 2017-09-22

## 2017-09-22 NOTE — TELEPHONE ENCOUNTER
From: Donny Haas  To: Maria Fernanda Guzmán M.D.  Sent: 9/22/2017 3:36 PM PDT  Subject: Test Result Question    Is there a way that I can email or download to you:  1) my [employer] 9/08/17 blood tests  and  2) [employer] analysis of the 9/08/17 blood test with comparisons to my 5/31/17 blood tests    I hope we can discuss these on Sep 29th    Thank you, Edward

## 2017-09-28 ENCOUNTER — TELEPHONE (OUTPATIENT)
Dept: MEDICAL GROUP | Age: 77
End: 2017-09-28

## 2017-09-28 NOTE — TELEPHONE ENCOUNTER
ESTABLISHED PATIENT PRE-VISIT PLANNING     Note: Patient will not be contacted if there is no indication to call.     1.  Reviewed notes from the last few office visits within the medical group: Yes    2.  If any orders were placed at last visit or intended to be done for this visit (i.e. 6 mos follow-up), do we have Results/Consult Notes?        •  Labs - Labs ordered, NOT completed. Patient advised to complete prior to next appointment.       •  Imaging - Imaging was not ordered at last office visit.       •  Referrals - No referrals were ordered at last office visit.    3. Is this appointment scheduled as a Hospital Follow-Up? No    4.  Immunizations were updated in Epic using WebIZ?: Epic matches Web[employer]       •  [employer] Recommendations: FLU and TD    5.  Patient is due for the following Health Maintenance Topics:   Health Maintenance Due   Topic Date Due   • Annual Wellness Visit  1940   • IMM INFLUENZA (1) 09/01/2017       - Patient is up-to-date on all Health Maintenance topics. No records have been requested at this time.    6.  Patient was NOT informed to arrive 15 min prior to their scheduled appointment and bring in their medication bottles.

## 2017-09-29 ENCOUNTER — HOSPITAL ENCOUNTER (OUTPATIENT)
Facility: MEDICAL CENTER | Age: 77
End: 2017-09-29
Attending: FAMILY MEDICINE
Payer: MEDICARE

## 2017-09-29 ENCOUNTER — OFFICE VISIT (OUTPATIENT)
Dept: MEDICAL GROUP | Age: 77
End: 2017-09-29
Payer: MEDICARE

## 2017-09-29 VITALS
HEIGHT: 67 IN | SYSTOLIC BLOOD PRESSURE: 140 MMHG | BODY MASS INDEX: 36.1 KG/M2 | OXYGEN SATURATION: 95 % | WEIGHT: 230 LBS | TEMPERATURE: 96.8 F | HEART RATE: 76 BPM | DIASTOLIC BLOOD PRESSURE: 82 MMHG

## 2017-09-29 DIAGNOSIS — E66.9 OBESITY (BMI 30-39.9): ICD-10-CM

## 2017-09-29 DIAGNOSIS — L82.0 INFLAMED SEBORRHEIC KERATOSIS: ICD-10-CM

## 2017-09-29 DIAGNOSIS — E78.5 HYPERLIPIDEMIA WITH TARGET LDL LESS THAN 100: ICD-10-CM

## 2017-09-29 DIAGNOSIS — N18.1 CHRONIC RENAL IMPAIRMENT, STAGE 1: ICD-10-CM

## 2017-09-29 DIAGNOSIS — D48.9 NEOPLASM OF UNCERTAIN BEHAVIOR: ICD-10-CM

## 2017-09-29 DIAGNOSIS — L57.0 AK (ACTINIC KERATOSIS): ICD-10-CM

## 2017-09-29 LAB — PATHOLOGY CONSULT NOTE: NORMAL

## 2017-09-29 PROCEDURE — 12031 INTMD RPR S/A/T/EXT 2.5 CM/<: CPT | Mod: GZ | Performed by: FAMILY MEDICINE

## 2017-09-29 PROCEDURE — 88305 TISSUE EXAM BY PATHOLOGIST: CPT

## 2017-09-29 PROCEDURE — 11401 EXC TR-EXT B9+MARG 0.6-1 CM: CPT | Mod: GZ | Performed by: FAMILY MEDICINE

## 2017-09-29 PROCEDURE — 99214 OFFICE O/P EST MOD 30 MIN: CPT | Mod: 25 | Performed by: FAMILY MEDICINE

## 2017-09-29 NOTE — ASSESSMENT & PLAN NOTE
We  noticed a  new, growing,hyperpigmented, irregular, bleeding macule on  routine skin exam.

## 2017-09-29 NOTE — PROGRESS NOTES
This medical record contains text that has been entered with the assistance of computer voice recognition and dictation software.  Therefore, it may contain unintended errors in text, spelling, punctuation, or grammar    Chief Complaint   Patient presents with   • Other     see reason for visit       Donny Haas is a 77 y.o. male here evaluation and management of: CRI, Obesity, NUB      HPI:     Chronic renal impairment, stage 1  Outside labs reveal   Creatinine 1.40 mg/dl  EGFR  48 (non )    He states that he did stop taking the motrin  BP is controlled    Obesity (BMI 30-39.9)  Patient states that he's been trying to lose weight, he cut his drinking down from 2 cocktails per night to 2 cocktails every other night. He has not been walking or doing the stationary bike. He plans to be more active with that.    Neoplasm of uncertain behavior  We  noticed a  new, growing,hyperpigmented, irregular, bleeding macule on  routine skin exam.           Current medicines (including changes today)  Current Outpatient Prescriptions   Medication Sig Dispense Refill   • atorvastatin (LIPITOR) 20 MG Tab TAKE 1 TABLET BY MOUTH EVERY DAY 30 Tab 0   • levothyroxine (SYNTHROID) 100 MCG Tab Take one tab po q24h 90 Tab 1   • terazosin (HYTRIN) 2 MG Cap Take one tab po q24h 90 Cap 0   • vitamin D, Ergocalciferol, (DRISDOL) 02187 UNITS CAPS capsule Take 1 Cap by mouth every 7 days. 8 Cap 0   • aspirin EC (ECOTRIN) 81 MG TBEC Take 81 mg by mouth every day.     • Diclofenac Sodium (VOLTAREN) 1 % GEL Apply 2 gm to effected fingers and heel qid prn. (Patient not taking: Reported on 5/22/2017) 1 Tube 3     No current facility-administered medications for this visit.      He  has a past medical history of Hyperlipidemia and Thyroid disease.  He  has a past surgical history that includes dental surgery; tonsillectomy; and hernia repair.  Social History   Substance Use Topics   • Smoking status: Former Smoker      "Packs/day: 1.00     Years: 19.00     Types: Cigarettes     Quit date: 3/22/1979   • Smokeless tobacco: Never Used   • Alcohol use 0.0 oz/week      Comment: 4 drinks a week.     Social History     Social History Narrative   • No narrative on file     Family History   Problem Relation Age of Onset   • Cancer Mother      breast cancer   • Thyroid Sister      Hasimotos   • Diabetes Sister      Type 2 DM   • Thyroid Brother      Hasimotos   • Diabetes Brother      Type 2 DM   • No Known Problems Maternal Grandmother    • Heart Attack Maternal Grandfather      Family Status   Relation Status   • Mother  at age 58   • Father  at age 86   • Sister Alive   • Brother Alive   • Maternal Grandmother    • Maternal Grandfather    • Paternal Grandmother    • Paternal Grandfather          ROS    Please see hpi     All other systems reviewed and are negative     Objective:     Blood pressure 140/82, pulse 76, temperature 36 °C (96.8 °F), height 1.702 m (5' 7.01\"), weight 104.3 kg (230 lb), SpO2 95 %. Body mass index is 36.01 kg/m².  Physical Exam:    Constitutional: Alert, no distress.  Skin: Warm, dry, good turgor, no rashes in visible areas.  Eye: Equal, round and reactive, conjunctiva clear, lids normal.  ENMT: Lips without lesions, good dentition, oropharynx clear.  Neck: Trachea midline, no masses, no thyromegaly. No cervical or supraclavicular lymphadenopathy.  Respiratory: Unlabored respiratory effort, lungs clear to auscultation, no wheezes, no ronchi.  Cardiovascular: Normal S1, S2, no murmur, no edema.  Abdomen: Soft, non-tender, no masses, no hepatosplenomegaly.  Psych: Alert and oriented x3, normal affect and mood.  Excision---7 mm, irregular, assymmetric, hyperpigmented macule located on right mid back    After informed written consent was obtained, using Betadine for cleansing and 1% Lidocaine w- epinephrine for anesthetic, with sterile technique a 6 mm punch tool was " used to obtain and excise  the lesion through dermis (full thickness) . Intent was to remove entire lesion with margins . Hemostasis was obtained by pressure and wound was Sutured  With 3.0 Nylon x2. Antibiotic dressing is applied, and wound care instructions provided. Be alert for any signs of cutaneous infection. The specimen is labeled and sent to pathology for evaluation. The procedure was well tolerated without complications.    Intermediate layer closure    The needle was inserted in the dermis and directed toward the skin surface, exiting near the dermal-epidermal junction on the same side.   then the needle was inserted on the opposite side of the wound near the dermal-epidermal junction, directly across from the point of exit.  The suture loop was completed in the dermis, directly opposite the origin of the loop, and the knot tied.        Assessment and Plan:   The following treatment plan was discussed      1. Obesity (BMI 30-39.9)    We discussed agressive lifestyle modifications with weight loss, aerobic exercise, and eating a prudent diet, which  included avoiding fried foods, using low-fat milk and avoiding simple carbohydrate, making a food diary. If you can't run because of pain do the stationary bike/elipticle or swim 30minutes 3 times per wk, in the beginning and then gradually increase.        2. Chronic renal impairment, stage 1  Continue bp control  Avoid Nsaids and all nephrotoxins  Maintain visits with nephrology      3. Neoplasm of uncertain behavior  Patient tollerrated procedure well  There were no adverse events  Patient was given post procedure precautions     - PATHOLOGY SPECIMEN; Future        HEALTH MAINTENANCE:    Instructed to Follow up in clinic or ER for worsening symptoms, difficulty breathing, lack of expected recovery, or should new symptoms or problems arise.    Followup: Return in about 2 weeks (around 10/13/2017) for Suture Removal.       Once again this medical record  contains text that has been entered with the assistance of computer voice recognition and dictation software.  Therefore, it may contain unintended errors in text, spelling, punctuation, or grammar

## 2017-09-29 NOTE — ASSESSMENT & PLAN NOTE
Patient states that he's been trying to lose weight, he cut his drinking down from 2 cocktails per night to 2 cocktails every other night. He has not been walking or doing the stationary bike. He plans to be more active with that.

## 2017-09-29 NOTE — ASSESSMENT & PLAN NOTE
Outside labs reveal   Creatinine 1.40 mg/dl  EGFR  48 (non )    He states that he did stop taking the motrin  BP is controlled

## 2017-10-03 RX ORDER — TERAZOSIN 2 MG/1
CAPSULE ORAL
Qty: 90 CAP | Refills: 0 | Status: SHIPPED | OUTPATIENT
Start: 2017-10-03 | End: 2018-04-15 | Stop reason: SDUPTHER

## 2017-10-03 RX ORDER — ATORVASTATIN CALCIUM 20 MG/1
TABLET, FILM COATED ORAL
Qty: 30 TAB | Refills: 0 | Status: SHIPPED | OUTPATIENT
Start: 2017-10-03 | End: 2017-10-26 | Stop reason: SDUPTHER

## 2017-10-11 ENCOUNTER — TELEPHONE (OUTPATIENT)
Dept: MEDICAL GROUP | Age: 77
End: 2017-10-11

## 2017-10-11 NOTE — TELEPHONE ENCOUNTER
ESTABLISHED PATIENT PRE-VISIT PLANNING     Note: Patient will not be contacted if there is no indication to call.     1.  Reviewed notes from the last few office visits within the medical group: Yes    2.  If any orders were placed at last visit or intended to be done for this visit (i.e. 6 mos follow-up), do we have Results/Consult Notes?        •  Labs - Labs ordered, completed on 9/29/10/3/17 and results are in chart.   Note: If patient appointment is for lab review and patient did not complete labs,                check with provider if OK to reschedule patient until labs completed.       •  Imaging - Imaging was not ordered at last office visit.       •  Referrals - Referral ordered, patient has NOT been seen.    3. Is this appointment scheduled as a Hospital Follow-Up? No    4.  Immunizations were updated in Epic using WebIZ?: Epic matches WebIZ       •  Web Iz Recommendations: FLU and TD    5.  Patient is due for the following Health Maintenance Topics:   Health Maintenance Due   Topic Date Due   • Annual Wellness Visit  1940   • IMM INFLUENZA (1) 09/01/2017       - Patient is up-to-date on all Health Maintenance topics. No records have been requested at this time.    6.  Patient was NOT informed to arrive 15 min prior to their scheduled appointment and bring in their medication bottles.

## 2017-10-12 ENCOUNTER — OFFICE VISIT (OUTPATIENT)
Dept: MEDICAL GROUP | Age: 77
End: 2017-10-12
Payer: MEDICARE

## 2017-10-12 VITALS
HEART RATE: 78 BPM | SYSTOLIC BLOOD PRESSURE: 140 MMHG | WEIGHT: 227.6 LBS | OXYGEN SATURATION: 94 % | BODY MASS INDEX: 35.72 KG/M2 | TEMPERATURE: 96.8 F | HEIGHT: 67 IN | DIASTOLIC BLOOD PRESSURE: 82 MMHG

## 2017-10-12 DIAGNOSIS — Z48.02 VISIT FOR SUTURE REMOVAL: ICD-10-CM

## 2017-10-12 PROCEDURE — 99212 OFFICE O/P EST SF 10 MIN: CPT | Performed by: FAMILY MEDICINE

## 2017-10-12 NOTE — ASSESSMENT & PLAN NOTE
The patient underwent excision and returns for suture removal.      Narrative     [facility]  [address] [place] [address] [address], [place] [address]  Phone [phone]          Fax [phone]  Chapin Alejandro M.D.     Brigitte Smith M.D.     Myesha Velásquez M.D.  Obed Krishna M.D.     Anastacio Cordvoa M.D.  Patient:    GUERRERO NICOLE       Specimen    [id number]  CRIS                       #:  Copies to:  SURGICAL PATHOLOGY CONSULTATION  FINAL DIAGNOSIS:  A. Skin excision, right mid back:  Lentiginous compound nevus with minor architecture atypia and  not present on examined margins.  No malignancy identified.  Comment: This case has been reviewed by two additional pathologists,  Christina Blair and Thaddeus, who concur with the diagnosis.  Diagnosis performed by:  BRIGITTE SMITH MD  ------------------------------------------------------------------------

## 2017-10-12 NOTE — PROGRESS NOTES
This medical record contains text that has been entered with the assistance of computer voice recognition and dictation software.  Therefore, it may contain unintended errors in text, spelling, punctuation, or grammar    Chief Complaint   Patient presents with   • Other     see reason for visit       Guerrero Nicole is a 77 y.o. male here evaluation and management of: suture removal      HPI:     Visit for suture removal    The patient underwent excision and returns for suture removal.      [facility]  [address] [place] [address] [address] [facility], [place] [address]  Phone [phone]          Fax [phone]  Chapin Alejandro M.D.     Brigitte Smith M.D.     Myesha Velásquez M.D.  Obed Krishna M.D.     Anastacio Cordova M.D.  Patient:    GUERRERO NICOLE       Specimen    [id number]  CRIS                       #:  Copies to:  SURGICAL PATHOLOGY CONSULTATION  FINAL DIAGNOSIS:  A. Skin excision, right mid back:  Lentiginous compound nevus with minor architecture atypia and  not present on examined margins.  No malignancy identified.  Comment: This case has been reviewed by two additional pathologists,  Christina Blair and Thaddeus, who concur with the diagnosis.  Diagnosis performed by:  BRIGITTE SMITH MD  ------------------------------------------------------------------------         Current medicines (including changes today)  Current Outpatient Prescriptions   Medication Sig Dispense Refill   • atorvastatin (LIPITOR) 20 MG Tab TAKE 1 TABLET BY MOUTH EVERY DAY 30 Tab 0   • terazosin (HYTRIN) 2 MG Cap TAKE 1 CAPSULE BY MOUTH EVERY 24 HOURS 90 Cap 0   • levothyroxine (SYNTHROID) 100 MCG Tab Take one tab po q24h 90 Tab 1   • vitamin D, Ergocalciferol, (DRISDOL) 02158 UNITS CAPS capsule Take 1 Cap by mouth every 7 days. 8 Cap 0   • aspirin EC (ECOTRIN) 81 MG TBEC Take 81 mg by mouth every day.     • Diclofenac Sodium (VOLTAREN) 1 % GEL Apply 2 gm to effected fingers and  "heel qid prn. (Patient not taking: Reported on 2017) 1 Tube 3     No current facility-administered medications for this visit.      He  has a past medical history of Hyperlipidemia and Thyroid disease.  He  has a past surgical history that includes dental surgery; tonsillectomy; and hernia repair.  Social History   Substance Use Topics   • Smoking status: Former Smoker     Packs/day: 1.00     Years: 19.00     Types: Cigarettes     Quit date: 3/22/1979   • Smokeless tobacco: Never Used   • Alcohol use 0.0 oz/week      Comment: 4 drinks a week.     Social History     Social History Narrative   • No narrative on file     Family History   Problem Relation Age of Onset   • Cancer Mother      breast cancer   • Thyroid Sister      Hasimotos   • Diabetes Sister      Type 2 DM   • Thyroid Brother      Hasimotos   • Diabetes Brother      Type 2 DM   • No Known Problems Maternal Grandmother    • Heart Attack Maternal Grandfather      Family Status   Relation Status   • Mother  at age 58   • Father  at age 86   • Sister Alive   • Brother Alive   • Maternal Grandmother    • Maternal Grandfather    • Paternal Grandmother    • Paternal Grandfather          ROS    Please see hpi     All other systems reviewed and are negative     Objective:     Blood pressure 140/82, pulse 78, temperature 36 °C (96.8 °F), height 1.702 m (5' 7.01\"), weight 103.2 kg (227 lb 9.6 oz), SpO2 94 %. Body mass index is 35.64 kg/m².  Physical Exam:    Constitutional: Alert, no distress.  Skin: Warm, dry, good turgor, no rashes in visible areas.  Eye: Equal, round and reactive, conjunctiva clear, lids normal.  ENMT: Lips without lesions, good dentition, oropharynx clear.  Neck: Trachea midline, no masses, no thyromegaly. No cervical or supraclavicular lymphadenopathy.  Respiratory: Unlabored respiratory effort, lungs clear to auscultation, no wheezes, no ronchi.  Cardiovascular: Normal S1, S2, no murmur, no " edema.  Abdomen: Soft, non-tender, no masses, no hepatosplenomegaly.  Psych: Alert and oriented x3, normal affect and mood.          Assessment and Plan:   The following treatment plan was discussed      1. Visit for suture removal  Sutures removed in normal clean fashion.  There were no adverse events.          HEALTH MAINTENANCE: due for AWV    Instructed to Follow up in clinic or ER for worsening symptoms, difficulty breathing, lack of expected recovery, or should new symptoms or problems arise.    Followup: Return in about 3 months (around 1/12/2018) for Reevaluation.       Once again this medical record contains text that has been entered with the assistance of computer voice recognition and dictation software.  Therefore, it may contain unintended errors in text, spelling, punctuation, or grammar

## 2017-10-26 RX ORDER — ATORVASTATIN CALCIUM 20 MG/1
TABLET, FILM COATED ORAL
Qty: 90 TAB | Refills: 0 | Status: SHIPPED | OUTPATIENT
Start: 2017-10-26 | End: 2018-01-17 | Stop reason: SDUPTHER

## 2018-04-17 RX ORDER — TERAZOSIN 2 MG/1
CAPSULE ORAL
Qty: 90 CAP | Refills: 0 | Status: SHIPPED | OUTPATIENT
Start: 2018-04-17 | End: 2018-07-10 | Stop reason: SDUPTHER

## 2018-05-03 ENCOUNTER — OFFICE VISIT (OUTPATIENT)
Dept: MEDICAL GROUP | Age: 78
End: 2018-05-03
Payer: MEDICARE

## 2018-05-03 VITALS
HEIGHT: 67 IN | WEIGHT: 227 LBS | BODY MASS INDEX: 35.63 KG/M2 | OXYGEN SATURATION: 90 % | TEMPERATURE: 97.2 F | DIASTOLIC BLOOD PRESSURE: 78 MMHG | SYSTOLIC BLOOD PRESSURE: 142 MMHG | HEART RATE: 77 BPM

## 2018-05-03 DIAGNOSIS — Z02.5 SPORTS PHYSICAL: ICD-10-CM

## 2018-05-03 DIAGNOSIS — L40.9 PSORIASIS: ICD-10-CM

## 2018-05-03 PROCEDURE — 7101 PR PHYSICAL: Performed by: FAMILY MEDICINE

## 2018-05-03 ASSESSMENT — PATIENT HEALTH QUESTIONNAIRE - PHQ9: CLINICAL INTERPRETATION OF PHQ2 SCORE: 0

## 2018-05-03 NOTE — PROGRESS NOTES
This medical record contains text that has been entered with the assistance of computer voice recognition and dictation software.  Therefore, it may contain unintended errors in text, spelling, punctuation, or grammar    Chief Complaint   Patient presents with   • Annual Exam   • Hypertension   • Knee Pain     Rt knee       Donny Ethan Haas is a 78 y.o. 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 He needs medical clearance to proceed.  He has no history of diabetes, no vision changes, no syncopal episodes no history of seizures. He's been doing this for over 20 years. The patient denied any chest pain, no sob, no dixon, no  pnd, no orthopnea, no headache, no changes in vision, no numbness or tingling, no nausea, no diarrhea, no abdominal pain, no fevers, no chills, no bright red blood per rectum, no  difficulty urinating, no burning during micturition, no depressed mood, no other concerns.        Current medicines (including changes today)  Current Outpatient Prescriptions   Medication Sig Dispense Refill   • terazosin (HYTRIN) 2 MG Cap TAKE 1 CAPSULE BY MOUTH EVERY 24 HOURS 90 Cap 0   • atorvastatin (LIPITOR) 20 MG Tab TAKE 1 TABLET BY MOUTH EVERY DAY 90 Tab 1   • levothyroxine (SYNTHROID) 100 MCG Tab Take one tab po q24h 90 Tab 1   • vitamin D, Ergocalciferol, (DRISDOL) 84404 UNITS CAPS capsule Take 1 Cap by mouth every 7 days. 8 Cap 0   • aspirin EC (ECOTRIN) 81 MG TBEC Take 81 mg by mouth every day.     • Diclofenac Sodium (VOLTAREN) 1 % GEL Apply 2 gm to effected fingers and heel qid prn. (Patient not taking: Reported on 5/22/2017) 1 Tube 3     No current facility-administered medications for this visit.      He  has a past medical history of Hyperlipidemia and Thyroid disease.  He  has a past surgical history that includes dental surgery;  "tonsillectomy; and hernia repair.  Social History   Substance Use Topics   • Smoking status: Former Smoker     Packs/day: 1.00     Years: 19.00     Types: Cigarettes     Quit date: 3/22/1979   • Smokeless tobacco: Never Used   • Alcohol use 0.0 oz/week      Comment: 4 drinks a week.     Social History     Social History Narrative   • No narrative on file     Family History   Problem Relation Age of Onset   • Cancer Mother      breast cancer   • Thyroid Sister      Hasimotos   • Diabetes Sister      Type 2 DM   • Thyroid Brother      Hasimotos   • Diabetes Brother      Type 2 DM   • No Known Problems Maternal Grandmother    • Heart Attack Maternal Grandfather      Family Status   Relation Status   • Mother  at age 58   • Father  at age 86   • Sister Alive   • Brother Alive   • Maternal Grandmother    • Maternal Grandfather    • Paternal Grandmother    • Paternal Grandfather          ROS    Please see hpi     All other systems reviewed and are negative     Objective:     Blood pressure 142/78, pulse 77, temperature 36.2 °C (97.2 °F), height 1.702 m (5' 7\"), SpO2 90 %. There is no height or weight on file to calculate BMI.  Physical Exam:    Constitutional: Alert, no distress.  Skin: Warm, dry, good turgor, no rashes in visible areas.  Eye: Equal, round and reactive, conjunctiva clear, lids normal.  ENMT: Lips without lesions, good dentition, oropharynx clear.  Neck: Trachea midline, no masses, no thyromegaly. No cervical or supraclavicular lymphadenopathy.  Respiratory: Unlabored respiratory effort, lungs clear to auscultation, no wheezes, no ronchi.  Cardiovascular: Normal S1, S2, no murmur, no edema.  Abdomen: Soft, non-tender, no masses, no hepatosplenomegaly.  Psych: Alert and oriented x3, normal affect and mood.          Assessment and Plan:   The following treatment plan was discussed      1. Psoriasis    - REFERRAL TO DERMATOLOGY    2. Sports physical    Patient " is cleared for driving, concussion precautions were given, there is no family h/o sudden death at an early age which would warrant further evaluation.           HEALTH MAINTENANCE:    Instructed to Follow up in clinic or ER for worsening symptoms, difficulty breathing, lack of expected recovery, or should new symptoms or problems arise.    Followup: Return in about 6 months (around 11/3/2018) for Reevaluation.       Once again this medical record contains text that has been entered with the assistance of computer voice recognition and dictation software.  Therefore, it may contain unintended errors in text, spelling, punctuation, or grammar

## 2018-07-11 RX ORDER — ATORVASTATIN CALCIUM 20 MG/1
TABLET, FILM COATED ORAL
Qty: 90 TAB | Refills: 0 | Status: SHIPPED | OUTPATIENT
Start: 2018-07-11 | End: 2018-10-04 | Stop reason: SDUPTHER

## 2018-07-11 RX ORDER — TERAZOSIN 2 MG/1
CAPSULE ORAL
Qty: 90 CAP | Refills: 0 | Status: SHIPPED | OUTPATIENT
Start: 2018-07-11 | End: 2018-10-04 | Stop reason: SDUPTHER

## 2018-08-07 RX ORDER — LEVOTHYROXINE SODIUM 0.1 MG/1
TABLET ORAL
Qty: 90 TAB | Refills: 0 | Status: SHIPPED | OUTPATIENT
Start: 2018-08-07 | End: 2018-11-01 | Stop reason: SDUPTHER

## 2018-10-02 ENCOUNTER — OFFICE VISIT (OUTPATIENT)
Dept: MEDICAL GROUP | Age: 78
End: 2018-10-02
Payer: MEDICARE

## 2018-10-02 VITALS
BODY MASS INDEX: 35.33 KG/M2 | WEIGHT: 225.6 LBS | HEART RATE: 77 BPM | DIASTOLIC BLOOD PRESSURE: 82 MMHG | TEMPERATURE: 97.3 F | SYSTOLIC BLOOD PRESSURE: 134 MMHG | RESPIRATION RATE: 12 BRPM | OXYGEN SATURATION: 95 %

## 2018-10-02 DIAGNOSIS — N45.1 EPIDIDYMITIS: ICD-10-CM

## 2018-10-02 DIAGNOSIS — N50.89 LUMP IN SCROTUM: ICD-10-CM

## 2018-10-02 DIAGNOSIS — Z23 NEED FOR VACCINATION: ICD-10-CM

## 2018-10-02 DIAGNOSIS — E66.9 OBESITY (BMI 30-39.9): ICD-10-CM

## 2018-10-02 PROCEDURE — G0008 ADMIN INFLUENZA VIRUS VAC: HCPCS | Performed by: INTERNAL MEDICINE

## 2018-10-02 PROCEDURE — 99203 OFFICE O/P NEW LOW 30 MIN: CPT | Mod: 25 | Performed by: INTERNAL MEDICINE

## 2018-10-02 PROCEDURE — 90662 IIV NO PRSV INCREASED AG IM: CPT | Performed by: INTERNAL MEDICINE

## 2018-10-02 RX ORDER — SULFAMETHOXAZOLE AND TRIMETHOPRIM 800; 160 MG/1; MG/1
1 TABLET ORAL 2 TIMES DAILY
Qty: 20 TAB | Refills: 0 | Status: SHIPPED | OUTPATIENT
Start: 2018-10-02 | End: 2019-06-25

## 2018-10-02 ASSESSMENT — ENCOUNTER SYMPTOMS
RESPIRATORY NEGATIVE: 1
MUSCULOSKELETAL NEGATIVE: 1
CONSTITUTIONAL NEGATIVE: 1
GASTROINTESTINAL NEGATIVE: 1
PSYCHIATRIC NEGATIVE: 1
CARDIOVASCULAR NEGATIVE: 1
NEUROLOGICAL NEGATIVE: 1
EYES NEGATIVE: 1

## 2018-10-02 NOTE — PROGRESS NOTES
Subjective:      Donny Haas is a 78 y.o. male who presents with Other (has had a lump for x1 week )  There is a new patient me unable see PCP today.  Presents with a lump in his right scrotal area for the last few weeks but without any pain.  There was no dysuria frequency or urgency.  The lump seems to have diminished in size in the last few days.  No prior history of this.              Other         Review of Systems   Constitutional: Negative.    HENT: Negative.    Eyes: Negative.    Respiratory: Negative.    Cardiovascular: Negative.    Gastrointestinal: Negative.    Genitourinary: Negative.    Musculoskeletal: Negative.    Skin: Negative.    Neurological: Negative.    Endo/Heme/Allergies: Negative.    Psychiatric/Behavioral: Negative.           Objective:     /82 (BP Location: Left arm, Patient Position: Sitting, BP Cuff Size: Adult)   Pulse 77   Temp 36.3 °C (97.3 °F)   Resp 12   Wt 102.3 kg (225 lb 9.6 oz)   SpO2 95%   BMI 35.33 kg/m²      Physical Exam   Constitutional: He is oriented to person, place, and time. He appears well-developed and well-nourished. No distress.   HENT:   Head: Normocephalic and atraumatic.   Right Ear: External ear normal.   Left Ear: External ear normal.   Nose: Nose normal.   Mouth/Throat: Oropharynx is clear and moist. No oropharyngeal exudate.   Eyes: Pupils are equal, round, and reactive to light. Conjunctivae and EOM are normal. Right eye exhibits no discharge. Left eye exhibits no discharge. No scleral icterus.   Neck: Normal range of motion. Neck supple. No JVD present. No tracheal deviation present. No thyromegaly present.   Cardiovascular: Normal rate, regular rhythm, normal heart sounds and intact distal pulses.  Exam reveals no gallop and no friction rub.    No murmur heard.  Pulmonary/Chest: Effort normal and breath sounds normal. No stridor. No respiratory distress. He has no wheezes. He has no rales. He exhibits no tenderness.    Abdominal: Soft. Bowel sounds are normal. He exhibits no distension and no mass. There is no tenderness. There is no rebound and no guarding.   Genitourinary:   Genitourinary Comments:  Exam of the genitalia shows that he has a slightly thickened epididymis on the right and a possible epididymal cyst or small cystic nodule or varicocele present in the testicular appendage.  There is no testicular mass.  There is no hernia.  There is no palpable tenderness.   Musculoskeletal: Normal range of motion. He exhibits no edema or tenderness.   Lymphadenopathy:     He has no cervical adenopathy.   Neurological: He is alert and oriented to person, place, and time. He has normal reflexes. He displays normal reflexes. He exhibits normal muscle tone. Coordination normal.   Skin: Skin is warm and dry. No rash noted. He is not diaphoretic. No erythema. No pallor.   Psychiatric: He has a normal mood and affect. His behavior is normal. Judgment and thought content normal.     No visits with results within 1 Month(s) from this visit.   Latest known visit with results is:   Hospital Outpatient Visit on 09/29/2017   Component Date Value   • Pathology Request 09/29/2017 Sent to [facility]       No results found for: HBA1C  Lab Results   Component Value Date/Time    SODIUM 140 05/31/2017 10:37 AM    POTASSIUM 4.5 05/31/2017 10:37 AM    CHLORIDE 105 05/31/2017 10:37 AM    CO2 28 05/31/2017 10:37 AM    GLUCOSE 90 05/31/2017 10:37 AM    BUN 23 (H) 05/31/2017 10:37 AM    CREATININE 1.23 05/31/2017 10:37 AM    ALKPHOSPHAT 68 05/31/2017 10:37 AM    ASTSGOT 20 05/31/2017 10:37 AM    ALTSGPT 22 05/31/2017 10:37 AM    TBILIRUBIN 0.8 05/31/2017 10:37 AM     No results found for: INR  Lab Results   Component Value Date/Time    CHOLSTRLTOT 166 05/31/2017 10:37 AM    LDL 89 05/31/2017 10:37 AM    HDL 56 05/31/2017 10:37 AM    TRIGLYCERIDE 104 05/31/2017 10:37 AM       No results found for: TESTOSTERONE  No results found for: TSH  No results found for:  FREET4  No results found for: URICACID  No components found for: VITB12  Lab Results   Component Value Date/Time    25HYDROXY 16 (L) 07/08/2015 09:10 AM     No visits with results within 1 Month(s) from this visit.   Latest known visit with results is:   Hospital Outpatient Visit on 09/29/2017   Component Date Value   • Pathology Request 09/29/2017 Sent to [facility]                     Assessment/Plan:     1. Need for vaccination      - INFLUENZA VACCINE, HIGH DOSE (65+ ONLY)    2. Lump in scrotum-this appears to be a benign scrotal cyst or nodule or an epididymal cyst.  There is no significant tenderness to suggest an acute epididymitis at this time however.  We will check an ultrasound to rule out any suspicious masses.  Follow-up in 2 weeks with PCP.  Trial of antibiotics to cover possibility of epididymitis.       - [id number]; Future  - POCT Urinalysis  - URINE CULTURE(NEW); Future    3. Epididymitis-see above     - sulfamethoxazole-trimethoprim (BACTRIM DS) 800-160 MG tablet; Take 1 Tab by mouth 2 times a day.  Dispense: 20 Tab; Refill: 0    4. Obesity (BMI 30-39.9)    diet/exercise/lose 15 lbs.; patient counseled      30 minute face-to-face encounter took place today.  More than half of this time was spent in the coordination of care of the above problems, as well as counseling.

## 2018-10-04 ENCOUNTER — OFFICE VISIT (OUTPATIENT)
Dept: URGENT CARE | Facility: PHYSICIAN GROUP | Age: 78
End: 2018-10-04
Payer: MEDICARE

## 2018-10-04 ENCOUNTER — HOSPITAL ENCOUNTER (OUTPATIENT)
Dept: RADIOLOGY | Facility: MEDICAL CENTER | Age: 78
End: 2018-10-04
Attending: INTERNAL MEDICINE
Payer: MEDICARE

## 2018-10-04 VITALS
DIASTOLIC BLOOD PRESSURE: 80 MMHG | OXYGEN SATURATION: 94 % | SYSTOLIC BLOOD PRESSURE: 142 MMHG | TEMPERATURE: 97.8 F | HEART RATE: 86 BPM | RESPIRATION RATE: 15 BRPM | BODY MASS INDEX: 33.33 KG/M2 | HEIGHT: 69 IN | WEIGHT: 225 LBS

## 2018-10-04 DIAGNOSIS — L02.31 ABSCESS OF BUTTOCK, RIGHT: ICD-10-CM

## 2018-10-04 DIAGNOSIS — N50.89 LUMP IN SCROTUM: ICD-10-CM

## 2018-10-04 PROCEDURE — 10060 I&D ABSCESS SIMPLE/SINGLE: CPT | Performed by: EMERGENCY MEDICINE

## 2018-10-04 PROCEDURE — 76870 US EXAM SCROTUM: CPT

## 2018-10-04 RX ORDER — DOXYCYCLINE HYCLATE 100 MG
100 TABLET ORAL 2 TIMES DAILY
Qty: 14 TAB | Refills: 0 | Status: CANCELLED | OUTPATIENT
Start: 2018-10-04

## 2018-10-04 ASSESSMENT — ENCOUNTER SYMPTOMS: FEVER: 0

## 2018-10-04 ASSESSMENT — PAIN SCALES - GENERAL: PAINLEVEL: NO PAIN

## 2018-10-04 NOTE — PATIENT INSTRUCTIONS
Abscess  Care After  An abscess (also called a boil or furuncle) is an infected area that contains a collection of pus. Signs and symptoms of an abscess include pain, tenderness, redness, or hardness, or you may feel a moveable soft area under your skin. An abscess can occur anywhere in the body. The infection may spread to surrounding tissues causing cellulitis. A cut (incision) by the surgeon was made over your abscess and the pus was drained out. Gauze may have been packed into the space to provide a drain that will allow the cavity to heal from the inside outwards. The boil may be painful for 5 to 7 days. Most people with a boil do not have high fevers. Your abscess, if seen early, may not have localized, and may not have been lanced. If not, another appointment may be required for this if it does not get better on its own or with medications.  HOME CARE INSTRUCTIONS   · Only take over-the-counter or prescription medicines for pain, discomfort, or fever as directed by your caregiver.  · When you bathe, soak and then remove gauze or iodoform packs at least daily or as directed by your caregiver. You may then wash the wound gently with mild soapy water. Repack with gauze or do as your caregiver directs.  SEEK IMMEDIATE MEDICAL CARE IF:   · You develop increased pain, swelling, redness, drainage, or bleeding in the wound site.  · You develop signs of generalized infection including muscle aches, chills, fever, or a general ill feeling.  · An oral temperature above 102° F (38.9° C) develops, not controlled by medication.  See your caregiver for a recheck if you develop any of the symptoms described above. If medications (antibiotics) were prescribed, take them as directed.  Document Released: 07/06/2006 Document Revised: 03/11/2013 Document Reviewed: 03/02/2009  [employer]® Patient Information ©2014 [employer].

## 2018-10-04 NOTE — PROGRESS NOTES
"Subjective:      Donny Haas is a 78 y.o. male who presents with Cyst (\"lump\" on buttocks. Pt suspects a tick.)            Rash   This is a new problem. The current episode started in the past 7 days. The problem has been gradually worsening since onset. The affected locations include the right buttock. The rash is characterized by redness and swelling. He was exposed to nothing. Pertinent negatives include no fever. Past treatments include nothing.   Patient notes history of psoriasis, may have scratched the area; subsequently noted swelling associated with a small scab.  [place]; no known tick bite or attachment.  Recent urological workup, given prescription for Bactrim that he is not started yet.  No other trauma to the area.    Review of Systems   Constitutional: Negative for fever.   Skin: Positive for rash. Negative for itching.     PMH:  has a past medical history of Hyperlipidemia and Thyroid disease.  MEDS:   Current Outpatient Prescriptions:   •  sulfamethoxazole-trimethoprim (BACTRIM DS) 800-160 MG tablet, Take 1 Tab by mouth 2 times a day., Disp: 20 Tab, Rfl: 0  •  levothyroxine (SYNTHROID) 100 MCG Tab, Take one tab po q24h, Disp: 90 Tab, Rfl: 0  •  atorvastatin (LIPITOR) 20 MG Tab, TAKE 1 TABLET BY MOUTH EVERY DAY, Disp: 90 Tab, Rfl: 0  •  terazosin (HYTRIN) 2 MG Cap, TAKE 1 CAPSULE BY MOUTH EVERY 24 HOURS, Disp: 90 Cap, Rfl: 0  •  vitamin D, Ergocalciferol, (DRISDOL) 47833 UNITS CAPS capsule, Take 1 Cap by mouth every 7 days., Disp: 8 Cap, Rfl: 0  •  aspirin EC (ECOTRIN) 81 MG TBEC, Take 81 mg by mouth every day., Disp: , Rfl:   •  Diclofenac Sodium (VOLTAREN) 1 % GEL, Apply 2 gm to effected fingers and heel qid prn. (Patient not taking: Reported on 5/22/2017), Disp: 1 Tube, Rfl: 3  ALLERGIES:   Allergies   Allergen Reactions   • Penicillins Rash   • Latex      SURGHX:   Past Surgical History:   Procedure Laterality Date   • DENTAL SURGERY     • HERNIA REPAIR   " "  • TONSILLECTOMY       SOCHX:  reports that he quit smoking about 39 years ago. His smoking use included Cigarettes. He has a 19.00 pack-year smoking history. He has never used smokeless tobacco. He reports that he drinks alcohol. He reports that he does not use drugs.  FH: family history includes Cancer in his mother; Diabetes in his brother and sister; Heart Attack in his maternal grandfather; No Known Problems in his maternal grandmother; Thyroid in his brother and sister.       Objective:     /80   Pulse 86   Temp 36.6 °C (97.8 °F)   Resp 15   Ht 1.753 m (5' 9\")   Wt 102.1 kg (225 lb)   SpO2 94%   BMI 33.23 kg/m²      Physical Exam   Constitutional: He appears well-developed and well-nourished. He is cooperative. He does not have a sickly appearance. He does not appear ill. No distress.   Musculoskeletal:        Right hip: Normal.        Lumbar back: He exhibits no bony tenderness and no swelling.   Neurological: He is alert.   Skin:        Psychiatric: He has a normal mood and affect.          Procedure: Incision and Drainage  -Risks, benefits, and alternatives discussed.   -Cleasn/sterile technique throughout  -Local anesthesia with 1% lidocaine with epinephrine  -Cleansed with Hibiclens  -Removed central eschar with fine forceps; minimal purulent drainage  -Incision with #11 blade into area without purulent material expressed  -Minimal bleeding with good hemostasis achieved  -The patient tolerated the procedure well       Assessment/Plan:     1. Abscess of buttock, right  I & D, simple  Use own Rx Bactrim DS; recheck in 2-3 days if not resolving.      "

## 2018-10-05 ENCOUNTER — TELEPHONE (OUTPATIENT)
Dept: MEDICAL GROUP | Age: 78
End: 2018-10-05

## 2018-10-05 RX ORDER — TERAZOSIN 2 MG/1
CAPSULE ORAL
Qty: 90 CAP | Refills: 0 | Status: SHIPPED | OUTPATIENT
Start: 2018-10-05 | End: 2018-12-29 | Stop reason: SDUPTHER

## 2018-10-05 RX ORDER — ATORVASTATIN CALCIUM 20 MG/1
TABLET, FILM COATED ORAL
Qty: 90 TAB | Refills: 0 | Status: SHIPPED | OUTPATIENT
Start: 2018-10-05 | End: 2018-12-29 | Stop reason: SDUPTHER

## 2018-10-05 NOTE — TELEPHONE ENCOUNTER
Phone Number Called: [phone] (home)       Message: Called LVM for patient to contact clinic. Patient needs to be informed of message from Dr. Chirinos .  Patient was sent mychart note to contact clinic.     Left Message for patient to call back: yes

## 2018-10-05 NOTE — TELEPHONE ENCOUNTER
----- Message from Shawn Sellers M.D. sent at 10/4/2018  3:34 PM PDT -----  Normal except for small cyst in the epididymitis ( the attachment to the testicle), a small varicose vein (a varicocele), both of which are common and not serious, and do not need any treatment unless they are causing pain or discomfort. Follow up with the urologist for 2nd opinion and confirmaiton.

## 2018-10-10 NOTE — TELEPHONE ENCOUNTER
Phone Number Called: [phone] (home)       Message: Called spoke with patient in regards to message from provider. Patient informed he was seen at urgent care who took care of the cyst.    Left Message for patient to call back: no

## 2018-11-02 ENCOUNTER — HOSPITAL ENCOUNTER (OUTPATIENT)
Dept: LAB | Facility: MEDICAL CENTER | Age: 78
End: 2018-11-02
Attending: FAMILY MEDICINE
Payer: MEDICARE

## 2018-11-02 ENCOUNTER — PATIENT MESSAGE (OUTPATIENT)
Dept: MEDICAL GROUP | Age: 78
End: 2018-11-02

## 2018-11-02 ENCOUNTER — OFFICE VISIT (OUTPATIENT)
Dept: MEDICAL GROUP | Age: 78
End: 2018-11-02
Payer: MEDICARE

## 2018-11-02 VITALS
HEIGHT: 69 IN | WEIGHT: 228 LBS | HEART RATE: 70 BPM | SYSTOLIC BLOOD PRESSURE: 130 MMHG | DIASTOLIC BLOOD PRESSURE: 78 MMHG | BODY MASS INDEX: 33.77 KG/M2 | OXYGEN SATURATION: 92 % | TEMPERATURE: 97.5 F

## 2018-11-02 DIAGNOSIS — Z02.5 SPORTS PHYSICAL: ICD-10-CM

## 2018-11-02 DIAGNOSIS — E78.5 HYPERLIPIDEMIA WITH TARGET LDL LESS THAN 100: ICD-10-CM

## 2018-11-02 DIAGNOSIS — N18.1 CHRONIC RENAL IMPAIRMENT, STAGE 1: ICD-10-CM

## 2018-11-02 DIAGNOSIS — R07.1 CHEST PAIN ON BREATHING: ICD-10-CM

## 2018-11-02 LAB
ALBUMIN SERPL BCP-MCNC: 4.1 G/DL (ref 3.2–4.9)
ALBUMIN/GLOB SERPL: 1.6 G/DL
ALP SERPL-CCNC: 72 U/L (ref 30–99)
ALT SERPL-CCNC: 16 U/L (ref 2–50)
ANION GAP SERPL CALC-SCNC: 7 MMOL/L (ref 0–11.9)
APPEARANCE UR: CLEAR
AST SERPL-CCNC: 18 U/L (ref 12–45)
BACTERIA #/AREA URNS HPF: NEGATIVE /HPF
BILIRUB SERPL-MCNC: 0.5 MG/DL (ref 0.1–1.5)
BILIRUB UR QL STRIP.AUTO: NEGATIVE
BUN SERPL-MCNC: 20 MG/DL (ref 8–22)
CALCIUM SERPL-MCNC: 9.6 MG/DL (ref 8.5–10.5)
CHLORIDE SERPL-SCNC: 106 MMOL/L (ref 96–112)
CHOLEST SERPL-MCNC: 163 MG/DL (ref 100–199)
CO2 SERPL-SCNC: 29 MMOL/L (ref 20–33)
COLOR UR: YELLOW
CREAT SERPL-MCNC: 1.22 MG/DL (ref 0.5–1.4)
EPI CELLS #/AREA URNS HPF: NEGATIVE /HPF
ERYTHROCYTE [DISTWIDTH] IN BLOOD BY AUTOMATED COUNT: 48.2 FL (ref 35.9–50)
FASTING STATUS PATIENT QL REPORTED: NORMAL
GLOBULIN SER CALC-MCNC: 2.6 G/DL (ref 1.9–3.5)
GLUCOSE SERPL-MCNC: 93 MG/DL (ref 65–99)
GLUCOSE UR STRIP.AUTO-MCNC: NEGATIVE MG/DL
HCT VFR BLD AUTO: 44.9 % (ref 42–52)
HDLC SERPL-MCNC: 69 MG/DL
HGB BLD-MCNC: 14.3 G/DL (ref 14–18)
HYALINE CASTS #/AREA URNS LPF: ABNORMAL /LPF
KETONES UR STRIP.AUTO-MCNC: NEGATIVE MG/DL
LDLC SERPL CALC-MCNC: 80 MG/DL
LEUKOCYTE ESTERASE UR QL STRIP.AUTO: NEGATIVE
MCH RBC QN AUTO: 31.1 PG (ref 27–33)
MCHC RBC AUTO-ENTMCNC: 31.8 G/DL (ref 33.7–35.3)
MCV RBC AUTO: 97.6 FL (ref 81.4–97.8)
MICRO URNS: ABNORMAL
NITRITE UR QL STRIP.AUTO: NEGATIVE
PH UR STRIP.AUTO: 5.5 [PH]
PLATELET # BLD AUTO: 218 K/UL (ref 164–446)
PMV BLD AUTO: 10.8 FL (ref 9–12.9)
POTASSIUM SERPL-SCNC: 4.8 MMOL/L (ref 3.6–5.5)
PROT SERPL-MCNC: 6.7 G/DL (ref 6–8.2)
PROT UR QL STRIP: 30 MG/DL
RBC # BLD AUTO: 4.6 M/UL (ref 4.7–6.1)
RBC # URNS HPF: ABNORMAL /HPF
RBC UR QL AUTO: NEGATIVE
SODIUM SERPL-SCNC: 142 MMOL/L (ref 135–145)
SP GR UR STRIP.AUTO: 1.02
TRIGL SERPL-MCNC: 70 MG/DL (ref 0–149)
UROBILINOGEN UR STRIP.AUTO-MCNC: 0.2 MG/DL
WBC # BLD AUTO: 5.9 K/UL (ref 4.8–10.8)
WBC #/AREA URNS HPF: ABNORMAL /HPF

## 2018-11-02 PROCEDURE — 7101 PR PHYSICAL: Performed by: FAMILY MEDICINE

## 2018-11-02 PROCEDURE — 36415 COLL VENOUS BLD VENIPUNCTURE: CPT

## 2018-11-02 PROCEDURE — 81001 URINALYSIS AUTO W/SCOPE: CPT

## 2018-11-02 PROCEDURE — 80053 COMPREHEN METABOLIC PANEL: CPT

## 2018-11-02 PROCEDURE — 85027 COMPLETE CBC AUTOMATED: CPT

## 2018-11-02 PROCEDURE — 80061 LIPID PANEL: CPT

## 2018-11-02 RX ORDER — LEVOTHYROXINE SODIUM 0.1 MG/1
TABLET ORAL
Qty: 90 TAB | Refills: 0 | Status: SHIPPED | OUTPATIENT
Start: 2018-11-02 | End: 2019-01-20 | Stop reason: SDUPTHER

## 2018-11-02 NOTE — PROGRESS NOTES
This medical record contains text that has been entered with the assistance of computer voice recognition and dictation software.  Therefore, it may contain unintended errors in text, spelling, punctuation, or grammar    Chief Complaint   Patient presents with   • Annual Exam         Donny Haas is a 78 y.o. male here evaluation and management of: not annual, sports physical      HPI:     Sports physical  The patient is a 78-year-old male who returns to clinic for an updated sports physical.  He is a  and raises maybe 2-3 times per year.  Overall he feels very able to continue this passionate support.  He has no history of syncope, no history of coronary artery disease, no vision problems no valvular problems no history of renal failure no history of stroke.   The patient denied any chest pain, no sob, no dixon, no  pnd, no orthopnea, no headache, no changes in vision, no numbness or tingling, no nausea, no diarrhea, no abdominal pain, no fevers, no chills, no bright red blood per rectum, no  difficulty urinating, no burning during micturition, no depressed mood, no other concerns.              Current medicines (including changes today)  Current Outpatient Prescriptions   Medication Sig Dispense Refill   • atorvastatin (LIPITOR) 20 MG Tab TAKE 1 TABLET BY MOUTH EVERY DAY 90 Tab 0   • terazosin (HYTRIN) 2 MG Cap TAKE 1 CAPSULE BY MOUTH EVERY 24 HOURS 90 Cap 0   • sulfamethoxazole-trimethoprim (BACTRIM DS) 800-160 MG tablet Take 1 Tab by mouth 2 times a day. 20 Tab 0   • levothyroxine (SYNTHROID) 100 MCG Tab Take one tab po q24h 90 Tab 0   • vitamin D, Ergocalciferol, (DRISDOL) 27434 UNITS CAPS capsule Take 1 Cap by mouth every 7 days. 8 Cap 0   • aspirin EC (ECOTRIN) 81 MG TBEC Take 81 mg by mouth every day.     • Diclofenac Sodium (VOLTAREN) 1 % GEL Apply 2 gm to effected fingers and heel qid prn. (Patient not taking: Reported on 5/22/2017) 1 Tube 3     No current facility-administered  "medications for this visit.      He  has a past medical history of Hyperlipidemia and Thyroid disease.  He  has a past surgical history that includes dental surgery; tonsillectomy; and hernia repair.  Social History   Substance Use Topics   • Smoking status: Former Smoker     Packs/day: 1.00     Years: 19.00     Types: Cigarettes     Quit date: 3/22/1979   • Smokeless tobacco: Never Used   • Alcohol use 0.0 oz/week      Comment: 4 drinks a week.     Social History     Social History Narrative   • No narrative on file     Family History   Problem Relation Age of Onset   • Cancer Mother         breast cancer   • Thyroid Sister         Hasimotos   • Diabetes Sister         Type 2 DM   • Thyroid Brother         Hasimotos   • Diabetes Brother         Type 2 DM   • No Known Problems Maternal Grandmother    • Heart Attack Maternal Grandfather      Family Status   Relation Status   • Mo  at age 58   • Fa  at age 86   • Sis Alive   • Bro Alive   • MGMo    • MGFa    • PGMo    • PGFa          ROS    Please see hpi     All other systems reviewed and are negative     Objective:     Blood pressure 130/78, pulse 70, temperature 36.4 °C (97.5 °F), temperature source Temporal, height 1.753 m (5' 9\"), weight 103.4 kg (228 lb), SpO2 92 %. Body mass index is 33.67 kg/m².  Physical Exam:    Constitutional: Alert, no distress.  Skin: Warm, dry, good turgor, no rashes in visible areas.  Eye: Equal, round and reactive, conjunctiva clear, lids normal.  ENMT: Lips without lesions, good dentition, oropharynx clear.  Neck: Trachea midline, no masses, no thyromegaly. No cervical or supraclavicular lymphadenopathy.  Respiratory: Unlabored respiratory effort, lungs clear to auscultation, no wheezes, no ronchi.  Cardiovascular: Normal S1, S2, no murmur, no edema.  Abdomen: Soft, non-tender, no masses, no hepatosplenomegaly.  Psych: Alert and oriented x3, normal affect and mood.          Assessment " and Plan:   The following treatment plan was discussed      1. Hyperlipidemia with target LDL less than 100    Need new labs    - LIPID PROFILE; Future  - CBC WITHOUT DIFFERENTIAL; Future  - COMP METABOLIC PANEL; Future    2. Chronic renal impairment, stage 1    Need new labs  For the league requirement    - URINALYSIS,CULTURE IF INDICATED; Future    3. Sports physical  Patient is cleared for his sport, once labs and UA are done           HEALTH MAINTENANCE:    Instructed to Follow up in clinic or ER for worsening symptoms, difficulty breathing, lack of expected recovery, or should new symptoms or problems arise.    Followup: Return in about 6 months (around 5/2/2019) for Reevaluation.       Once again this medical record contains text that has been entered with the assistance of computer voice recognition and dictation software.  Therefore, it may contain unintended errors in text, spelling, punctuation, or grammar

## 2018-11-02 NOTE — ASSESSMENT & PLAN NOTE
The patient is a 78-year-old male who returns to clinic for an updated sports physical.  He is a  and raises maybe 2-3 times per year.  Overall he feels very able to continue this passionate support.  He has no history of syncope, no history of coronary artery disease, no vision problems no valvular problems no history of renal failure no history of stroke.   The patient denied any chest pain, no sob, no dixon, no  pnd, no orthopnea, no headache, no changes in vision, no numbness or tingling, no nausea, no diarrhea, no abdominal pain, no fevers, no chills, no bright red blood per rectum, no  difficulty urinating, no burning during micturition, no depressed mood, no other concerns.

## 2018-11-02 NOTE — TELEPHONE ENCOUNTER
Do not take any NSAID medications Was the patient seen in the last year in this department? Yes    Does patient have an active prescription for medications requested? No     Received Request Via: Pharmacy

## 2018-12-24 ENCOUNTER — HOSPITAL ENCOUNTER (OUTPATIENT)
Dept: LAB | Facility: MEDICAL CENTER | Age: 78
End: 2018-12-24
Attending: OPHTHALMOLOGY
Payer: MEDICARE

## 2018-12-24 LAB
ANION GAP SERPL CALC-SCNC: 7 MMOL/L (ref 0–11.9)
BASOPHILS # BLD AUTO: 1 % (ref 0–1.8)
BASOPHILS # BLD: 0.05 K/UL (ref 0–0.12)
BUN SERPL-MCNC: 20 MG/DL (ref 8–22)
CALCIUM SERPL-MCNC: 9.2 MG/DL (ref 8.5–10.5)
CHLORIDE SERPL-SCNC: 108 MMOL/L (ref 96–112)
CO2 SERPL-SCNC: 25 MMOL/L (ref 20–33)
CREAT SERPL-MCNC: 1.2 MG/DL (ref 0.5–1.4)
EOSINOPHIL # BLD AUTO: 0.22 K/UL (ref 0–0.51)
EOSINOPHIL NFR BLD: 4.6 % (ref 0–6.9)
ERYTHROCYTE [DISTWIDTH] IN BLOOD BY AUTOMATED COUNT: 48.7 FL (ref 35.9–50)
GLUCOSE SERPL-MCNC: 87 MG/DL (ref 65–99)
HCT VFR BLD AUTO: 43.2 % (ref 42–52)
HGB BLD-MCNC: 14.5 G/DL (ref 14–18)
IMM GRANULOCYTES # BLD AUTO: 0.02 K/UL (ref 0–0.11)
IMM GRANULOCYTES NFR BLD AUTO: 0.4 % (ref 0–0.9)
LYMPHOCYTES # BLD AUTO: 1.16 K/UL (ref 1–4.8)
LYMPHOCYTES NFR BLD: 24.3 % (ref 22–41)
MCH RBC QN AUTO: 31.6 PG (ref 27–33)
MCHC RBC AUTO-ENTMCNC: 33.6 G/DL (ref 33.7–35.3)
MCV RBC AUTO: 94.1 FL (ref 81.4–97.8)
MONOCYTES # BLD AUTO: 0.49 K/UL (ref 0–0.85)
MONOCYTES NFR BLD AUTO: 10.3 % (ref 0–13.4)
NEUTROPHILS # BLD AUTO: 2.83 K/UL (ref 1.82–7.42)
NEUTROPHILS NFR BLD: 59.4 % (ref 44–72)
NRBC # BLD AUTO: 0 K/UL
NRBC BLD-RTO: 0 /100 WBC
PLATELET # BLD AUTO: 195 K/UL (ref 164–446)
PMV BLD AUTO: 10.5 FL (ref 9–12.9)
POTASSIUM SERPL-SCNC: 4.1 MMOL/L (ref 3.6–5.5)
RBC # BLD AUTO: 4.59 M/UL (ref 4.7–6.1)
SODIUM SERPL-SCNC: 140 MMOL/L (ref 135–145)
WBC # BLD AUTO: 4.8 K/UL (ref 4.8–10.8)

## 2018-12-24 PROCEDURE — 36415 COLL VENOUS BLD VENIPUNCTURE: CPT

## 2018-12-24 PROCEDURE — 85025 COMPLETE CBC W/AUTO DIFF WBC: CPT

## 2018-12-24 PROCEDURE — 80048 BASIC METABOLIC PNL TOTAL CA: CPT

## 2018-12-31 RX ORDER — ATORVASTATIN CALCIUM 20 MG/1
TABLET, FILM COATED ORAL
Qty: 90 TAB | Refills: 0 | Status: SHIPPED | OUTPATIENT
Start: 2018-12-31 | End: 2019-03-28 | Stop reason: SDUPTHER

## 2018-12-31 RX ORDER — TERAZOSIN 2 MG/1
CAPSULE ORAL
Qty: 90 CAP | Refills: 0 | Status: SHIPPED | OUTPATIENT
Start: 2018-12-31 | End: 2019-03-28 | Stop reason: SDUPTHER

## 2019-01-22 RX ORDER — LEVOTHYROXINE SODIUM 0.1 MG/1
TABLET ORAL
Qty: 90 TAB | Refills: 0 | Status: SHIPPED | OUTPATIENT
Start: 2019-01-22 | End: 2019-03-28 | Stop reason: SDUPTHER

## 2019-06-25 ENCOUNTER — HOSPITAL ENCOUNTER (OUTPATIENT)
Dept: RADIOLOGY | Facility: MEDICAL CENTER | Age: 79
End: 2019-06-25
Attending: FAMILY MEDICINE
Payer: MEDICARE

## 2019-06-25 ENCOUNTER — OFFICE VISIT (OUTPATIENT)
Dept: MEDICAL GROUP | Age: 79
End: 2019-06-25
Payer: MEDICARE

## 2019-06-25 VITALS
DIASTOLIC BLOOD PRESSURE: 62 MMHG | HEIGHT: 69 IN | OXYGEN SATURATION: 91 % | BODY MASS INDEX: 33.98 KG/M2 | WEIGHT: 229.4 LBS | TEMPERATURE: 97.8 F | SYSTOLIC BLOOD PRESSURE: 142 MMHG | HEART RATE: 66 BPM

## 2019-06-25 DIAGNOSIS — I10 ESSENTIAL HYPERTENSION: ICD-10-CM

## 2019-06-25 DIAGNOSIS — I49.9 IRREGULAR HEART RATE: ICD-10-CM

## 2019-06-25 DIAGNOSIS — M25.551 PAIN OF RIGHT HIP JOINT: ICD-10-CM

## 2019-06-25 DIAGNOSIS — N40.0 BENIGN PROSTATIC HYPERPLASIA WITHOUT LOWER URINARY TRACT SYMPTOMS: ICD-10-CM

## 2019-06-25 DIAGNOSIS — L40.9 PSORIASIS: ICD-10-CM

## 2019-06-25 DIAGNOSIS — R53.83 OTHER FATIGUE: ICD-10-CM

## 2019-06-25 DIAGNOSIS — E78.5 HYPERLIPIDEMIA WITH TARGET LDL LESS THAN 100: ICD-10-CM

## 2019-06-25 PROBLEM — Z89.621: Status: ACTIVE | Noted: 2019-06-25

## 2019-06-25 PROBLEM — Z89.621: Status: RESOLVED | Noted: 2019-06-25 | Resolved: 2019-06-25

## 2019-06-25 PROCEDURE — 99215 OFFICE O/P EST HI 40 MIN: CPT | Performed by: FAMILY MEDICINE

## 2019-06-25 PROCEDURE — 93000 ELECTROCARDIOGRAM COMPLETE: CPT | Performed by: FAMILY MEDICINE

## 2019-06-25 PROCEDURE — 73522 X-RAY EXAM HIPS BI 3-4 VIEWS: CPT

## 2019-06-25 ASSESSMENT — PATIENT HEALTH QUESTIONNAIRE - PHQ9: CLINICAL INTERPRETATION OF PHQ2 SCORE: 0

## 2019-06-25 NOTE — PROGRESS NOTES
This medical record contains text that has been entered with the assistance of computer voice recognition and dictation software.  Therefore, it may contain unintended errors in text, spelling, punctuation, or grammar        Chief Complaint   Patient presents with   • Irregular Heart Beat     noticed about 6 weeks ago   • Hypertension     has had high blood pressure         Donny Haas is a 79 y.o. male here evaluation and management of: Irregular heartbeat, high blood pressure, knee pain      HPI:     1. Hyperlipidemia with target LDL less than 100  Atorvastatin 20 mg p.o. Nightly    The patient has been on a statin for years and tolerating this fine. The patient denies any muscle aches, no abdominal pain and no history of elevated liver enzymes.    2. Essential hypertension  Patient has been taking Wilson in both for BPH and high blood pressure.  He states his home BP readings have been elevated  Ranging from 145-178/75-95  He denies any dizziness, no presyncopal episodes, no chest pain no shortness breath no back pain peer    3. Irregular heart rate  NEW PROBLEM    Patient states his home blood pressure monitor also has been telling him that he has had episodic irregular heart beats. The patient denies palpitations, tachycardia, fatigue, weakness, dizziness, lightheadedness, reduced exercise capacity, increased urination, or mild dyspnea. Also denies  dyspnea at rest, angina, presyncope, or syncope. In addition,  Denies , peripheral edema, weight gain, or ascites.      4. Benign prostatic hyperplasia without lower urinary tract symptoms  The patient denies  hesistancy, no dysuria, is able to maintain urinary stream, no spliting or spraying of urinary stream, no terminal dribbling.        5. Other fatigue  The patient would like to obtain a TSH as he has had more fatigue    6. Pain of right hip joint    NEW PROBLEM    The patient has been complaining for right hip/thigh pain, which started about  4 months ago.  The patient states that he sometimes have to walk pigeon toed in order to prevent hip pain in the right side.  The pain is sharp starts at the right hip goes down his quadriceps.  There is no popping locking or instability there has been no trauma.    Current medicines (including changes today)  Current Outpatient Prescriptions   Medication Sig Dispense Refill   • terazosin (HYTRIN) 2 MG Cap TAKE 1 CAPSULE BY MOUTH EVERY 24 HOURS 90 Cap 1   • atorvastatin (LIPITOR) 20 MG Tab TAKE 1 TABLET BY MOUTH EVERY DAY 90 Tab 1   • levothyroxine (SYNTHROID) 100 MCG Tab TAKE 1 TABLET BY MOUTH EVERY 24 HOURS 90 Tab 1   • vitamin D, Ergocalciferol, (DRISDOL) 76644 UNITS CAPS capsule Take 1 Cap by mouth every 7 days. 8 Cap 0     No current facility-administered medications for this visit.      He  has a past medical history of Hyperlipidemia and Thyroid disease.  He  has a past surgical history that includes dental surgery; tonsillectomy; and hernia repair.  Social History   Substance Use Topics   • Smoking status: Former Smoker     Packs/day: 1.00     Years: 19.00     Types: Cigarettes     Quit date: 3/22/1979   • Smokeless tobacco: Never Used   • Alcohol use 0.0 oz/week      Comment: 4 drinks a week.     Social History     Social History Narrative   • No narrative on file     Family History   Problem Relation Age of Onset   • Cancer Mother         breast cancer   • Thyroid Sister         Hasimotos   • Diabetes Sister         Type 2 DM   • Thyroid Brother         Hasimotos   • Diabetes Brother         Type 2 DM   • No Known Problems Maternal Grandmother    • Heart Attack Maternal Grandfather      Family Status   Relation Status   • Mo  at age 58   • Fa  at age 86   • Sis Alive   • Bro Alive   • MGMo    • MGFa    • PGMo    • PGFa          ROS    The pertinent  ROS findings can be seen in the HPI above.     All other systems reviewed and are negative     Objective:  "    /62 (BP Location: Left arm, Patient Position: Sitting, BP Cuff Size: Adult)   Pulse 66   Temp 36.6 °C (97.8 °F) (Temporal)   Ht 1.753 m (5' 9\")   Wt 104.1 kg (229 lb 6.4 oz)   SpO2 91%  Body mass index is 33.88 kg/m².      Physical Exam:    Constitutional: Alert, no distress.  Skin: no rashes  Eye: Equal, round and reactive, conjunctiva clear, lids normal.  ENMT: Lips without lesions, good dentition, oropharynx clear.  Neck: Trachea midline, no masses, no thyromegaly. No cervical or supraclavicular lymphadenopathy.  Respiratory: Unlabored respiratory effort, lungs clear to auscultation, no wheezes, no ronchi.  Cardiovascular: Normal S1, S2, no murmur, no edema  Abdomen: Soft, non-tender, no masses, no hepatosplenomegaly.  Psych: Alert and oriented x3, normal affect and mood.  EKG reveals sinus rhythm, heart rate of 70 no ST segment changes, no Q waves        Assessment and Plan:   The following treatment plan was discussed      1. Hyperlipidemia with target LDL less than 100    We will obtain new labs to update clinical profile.  Then we will adjust therapy as needed.      - CBC WITHOUT DIFFERENTIAL; Future  - Comp Metabolic Panel; Future  - Lipid Profile; Future    2. Essential hypertension    Patient  was given instructions to make a two-week home BP log  Then provide this to our clinic, we will compare to our office manual measurement        We will then consider appropriate treatment      3. Irregular heart rate    EKG was unremarkable see above  I believe his machine at home is inaccurate  We will keep an eye on his  - EKG; Future    4. Right hip amputation status    Patient was instructed on activity modification ×2 weeks  NSAIDs when necessary  Ice when necessary and compression    - DX-HIP-BILATERAL-WITH PELVIS-3/4 VIEWS; Future    5. Benign prostatic hyperplasia without lower urinary tract symptoms    We will obtain new labs to update clinical profile.  Then we will adjust therapy as " needed.    - PROSTATE SPECIFIC AG DIAGNOSTIC; Future    6. Other fatigue  We will obtain thyroid panel  Although this could be related to the normal aging process    - TSH WITH REFLEX TO FT4; Future        Over 40 minutes spent with patient face to face, greater than 50% time spent with plan/cordination of care as above in my A/P.  We discussed the importance of physical therapy for most musculoskeletal injuries including hip pain.  We also discussed the etiology of irregular heartbeat, we reviewed his EKG and discussed all possible etiologies.  We also discussed the importance of annual labs.        Instructed to Follow up in clinic or ER for worsening symptoms, difficulty breathing, lack of expected recovery, or should new symptoms or problems arise.    Followup: Return in about 2 weeks (around 7/9/2019) for Reevaluation, 2 week BP log.       Once again this medical record contains text that has been entered with the assistance of computer voice recognition and dictation software.  Therefore, it may contain unintended errors in text, spelling, punctuation, or grammar

## 2019-06-26 ENCOUNTER — HOSPITAL ENCOUNTER (OUTPATIENT)
Dept: LAB | Facility: MEDICAL CENTER | Age: 79
End: 2019-06-26
Attending: FAMILY MEDICINE
Payer: MEDICARE

## 2019-06-26 DIAGNOSIS — N40.0 BENIGN PROSTATIC HYPERPLASIA WITHOUT LOWER URINARY TRACT SYMPTOMS: ICD-10-CM

## 2019-06-26 DIAGNOSIS — E78.5 HYPERLIPIDEMIA WITH TARGET LDL LESS THAN 100: ICD-10-CM

## 2019-06-26 DIAGNOSIS — R53.83 OTHER FATIGUE: ICD-10-CM

## 2019-06-26 LAB
ALBUMIN SERPL BCP-MCNC: 3.8 G/DL (ref 3.2–4.9)
ALBUMIN/GLOB SERPL: 1.5 G/DL
ALP SERPL-CCNC: 73 U/L (ref 30–99)
ALT SERPL-CCNC: 12 U/L (ref 2–50)
ANION GAP SERPL CALC-SCNC: 9 MMOL/L (ref 0–11.9)
AST SERPL-CCNC: 17 U/L (ref 12–45)
BILIRUB SERPL-MCNC: 0.8 MG/DL (ref 0.1–1.5)
BUN SERPL-MCNC: 20 MG/DL (ref 8–22)
CALCIUM SERPL-MCNC: 8.7 MG/DL (ref 8.5–10.5)
CHLORIDE SERPL-SCNC: 106 MMOL/L (ref 96–112)
CHOLEST SERPL-MCNC: 145 MG/DL (ref 100–199)
CO2 SERPL-SCNC: 26 MMOL/L (ref 20–33)
CREAT SERPL-MCNC: 1.43 MG/DL (ref 0.5–1.4)
ERYTHROCYTE [DISTWIDTH] IN BLOOD BY AUTOMATED COUNT: 49 FL (ref 35.9–50)
FASTING STATUS PATIENT QL REPORTED: NORMAL
GLOBULIN SER CALC-MCNC: 2.5 G/DL (ref 1.9–3.5)
GLUCOSE SERPL-MCNC: 96 MG/DL (ref 65–99)
HCT VFR BLD AUTO: 46.5 % (ref 42–52)
HDLC SERPL-MCNC: 57 MG/DL
HGB BLD-MCNC: 14.5 G/DL (ref 14–18)
LDLC SERPL CALC-MCNC: 69 MG/DL
MCH RBC QN AUTO: 30.6 PG (ref 27–33)
MCHC RBC AUTO-ENTMCNC: 31.2 G/DL (ref 33.7–35.3)
MCV RBC AUTO: 98.1 FL (ref 81.4–97.8)
PLATELET # BLD AUTO: 212 K/UL (ref 164–446)
PMV BLD AUTO: 10.8 FL (ref 9–12.9)
POTASSIUM SERPL-SCNC: 4.1 MMOL/L (ref 3.6–5.5)
PROT SERPL-MCNC: 6.3 G/DL (ref 6–8.2)
PSA SERPL-MCNC: 4.69 NG/ML (ref 0–4)
RBC # BLD AUTO: 4.74 M/UL (ref 4.7–6.1)
SODIUM SERPL-SCNC: 141 MMOL/L (ref 135–145)
TRIGL SERPL-MCNC: 95 MG/DL (ref 0–149)
TSH SERPL DL<=0.005 MIU/L-ACNC: 3.12 UIU/ML (ref 0.38–5.33)
WBC # BLD AUTO: 5.2 K/UL (ref 4.8–10.8)

## 2019-06-26 PROCEDURE — 80061 LIPID PANEL: CPT

## 2019-06-26 PROCEDURE — 80053 COMPREHEN METABOLIC PANEL: CPT

## 2019-06-26 PROCEDURE — 85027 COMPLETE CBC AUTOMATED: CPT

## 2019-06-26 PROCEDURE — 84153 ASSAY OF PSA TOTAL: CPT

## 2019-06-26 PROCEDURE — 36415 COLL VENOUS BLD VENIPUNCTURE: CPT

## 2019-06-26 PROCEDURE — 84443 ASSAY THYROID STIM HORMONE: CPT

## 2019-09-24 RX ORDER — TERAZOSIN 2 MG/1
CAPSULE ORAL
Qty: 90 CAP | Refills: 2 | Status: SHIPPED | OUTPATIENT
Start: 2019-09-24 | End: 2020-06-23

## 2019-09-24 RX ORDER — LEVOTHYROXINE SODIUM 0.1 MG/1
TABLET ORAL
Qty: 90 TAB | Refills: 2 | Status: SHIPPED | OUTPATIENT
Start: 2019-09-24 | End: 2020-06-23

## 2019-09-24 RX ORDER — ATORVASTATIN CALCIUM 20 MG/1
TABLET, FILM COATED ORAL
Qty: 90 TAB | Refills: 2 | Status: SHIPPED | OUTPATIENT
Start: 2019-09-24 | End: 2020-06-23

## 2019-11-14 ENCOUNTER — HOSPITAL ENCOUNTER (OUTPATIENT)
Facility: MEDICAL CENTER | Age: 79
End: 2019-11-14
Attending: EMERGENCY MEDICINE | Admitting: INTERNAL MEDICINE
Payer: MEDICARE

## 2019-11-14 ENCOUNTER — APPOINTMENT (OUTPATIENT)
Dept: RADIOLOGY | Facility: MEDICAL CENTER | Age: 79
End: 2019-11-14
Attending: INTERNAL MEDICINE
Payer: MEDICARE

## 2019-11-14 ENCOUNTER — APPOINTMENT (OUTPATIENT)
Dept: RADIOLOGY | Facility: MEDICAL CENTER | Age: 79
End: 2019-11-14
Attending: EMERGENCY MEDICINE
Payer: MEDICARE

## 2019-11-14 VITALS
WEIGHT: 226.19 LBS | TEMPERATURE: 97.5 F | HEART RATE: 73 BPM | RESPIRATION RATE: 18 BRPM | BODY MASS INDEX: 33.5 KG/M2 | OXYGEN SATURATION: 93 % | DIASTOLIC BLOOD PRESSURE: 67 MMHG | HEIGHT: 69 IN | SYSTOLIC BLOOD PRESSURE: 150 MMHG

## 2019-11-14 DIAGNOSIS — R07.9 ACUTE CHEST PAIN: ICD-10-CM

## 2019-11-14 LAB
ALBUMIN SERPL BCP-MCNC: 4.1 G/DL (ref 3.2–4.9)
ALBUMIN/GLOB SERPL: 1.4 G/DL
ALP SERPL-CCNC: 103 U/L (ref 30–99)
ALT SERPL-CCNC: 10 U/L (ref 2–50)
ANION GAP SERPL CALC-SCNC: 12 MMOL/L (ref 0–11.9)
AST SERPL-CCNC: 16 U/L (ref 12–45)
BASOPHILS # BLD AUTO: 1.1 % (ref 0–1.8)
BASOPHILS # BLD: 0.05 K/UL (ref 0–0.12)
BILIRUB SERPL-MCNC: 0.3 MG/DL (ref 0.1–1.5)
BUN SERPL-MCNC: 16 MG/DL (ref 8–22)
CALCIUM SERPL-MCNC: 8.9 MG/DL (ref 8.4–10.2)
CHLORIDE SERPL-SCNC: 103 MMOL/L (ref 96–112)
CO2 SERPL-SCNC: 23 MMOL/L (ref 20–33)
CREAT SERPL-MCNC: 1.16 MG/DL (ref 0.5–1.4)
EKG IMPRESSION: NORMAL
EKG IMPRESSION: NORMAL
EOSINOPHIL # BLD AUTO: 0.32 K/UL (ref 0–0.51)
EOSINOPHIL NFR BLD: 7.3 % (ref 0–6.9)
ERYTHROCYTE [DISTWIDTH] IN BLOOD BY AUTOMATED COUNT: 46.6 FL (ref 35.9–50)
GLOBULIN SER CALC-MCNC: 2.9 G/DL (ref 1.9–3.5)
GLUCOSE SERPL-MCNC: 114 MG/DL (ref 65–99)
HCT VFR BLD AUTO: 44.3 % (ref 42–52)
HGB BLD-MCNC: 15 G/DL (ref 14–18)
IMM GRANULOCYTES # BLD AUTO: 0.02 K/UL (ref 0–0.11)
IMM GRANULOCYTES NFR BLD AUTO: 0.5 % (ref 0–0.9)
LYMPHOCYTES # BLD AUTO: 1.51 K/UL (ref 1–4.8)
LYMPHOCYTES NFR BLD: 34.5 % (ref 22–41)
MCH RBC QN AUTO: 32.1 PG (ref 27–33)
MCHC RBC AUTO-ENTMCNC: 33.9 G/DL (ref 33.7–35.3)
MCV RBC AUTO: 94.9 FL (ref 81.4–97.8)
MONOCYTES # BLD AUTO: 0.55 K/UL (ref 0–0.85)
MONOCYTES NFR BLD AUTO: 12.6 % (ref 0–13.4)
NEUTROPHILS # BLD AUTO: 1.93 K/UL (ref 1.82–7.42)
NEUTROPHILS NFR BLD: 44 % (ref 44–72)
NRBC # BLD AUTO: 0 K/UL
NRBC BLD-RTO: 0 /100 WBC
PLATELET # BLD AUTO: 201 K/UL (ref 164–446)
PMV BLD AUTO: 10.3 FL (ref 9–12.9)
POTASSIUM SERPL-SCNC: 3.8 MMOL/L (ref 3.6–5.5)
PROT SERPL-MCNC: 7 G/DL (ref 6–8.2)
RBC # BLD AUTO: 4.67 M/UL (ref 4.7–6.1)
SODIUM SERPL-SCNC: 138 MMOL/L (ref 135–145)
TROPONIN T SERPL-MCNC: 28 NG/L (ref 6–19)
TROPONIN T SERPL-MCNC: 28 NG/L (ref 6–19)
TROPONIN T SERPL-MCNC: 29 NG/L (ref 6–19)
WBC # BLD AUTO: 4.4 K/UL (ref 4.8–10.8)

## 2019-11-14 PROCEDURE — 84484 ASSAY OF TROPONIN QUANT: CPT

## 2019-11-14 PROCEDURE — 36415 COLL VENOUS BLD VENIPUNCTURE: CPT

## 2019-11-14 PROCEDURE — 85025 COMPLETE CBC W/AUTO DIFF WBC: CPT

## 2019-11-14 PROCEDURE — 71275 CT ANGIOGRAPHY CHEST: CPT

## 2019-11-14 PROCEDURE — G0378 HOSPITAL OBSERVATION PER HR: HCPCS

## 2019-11-14 PROCEDURE — 99236 HOSP IP/OBS SAME DATE HI 85: CPT | Performed by: INTERNAL MEDICINE

## 2019-11-14 PROCEDURE — A9502 TC99M TETROFOSMIN: HCPCS

## 2019-11-14 PROCEDURE — 700117 HCHG RX CONTRAST REV CODE 255: Performed by: EMERGENCY MEDICINE

## 2019-11-14 PROCEDURE — A9270 NON-COVERED ITEM OR SERVICE: HCPCS

## 2019-11-14 PROCEDURE — 700102 HCHG RX REV CODE 250 W/ 637 OVERRIDE(OP): Performed by: INTERNAL MEDICINE

## 2019-11-14 PROCEDURE — 700102 HCHG RX REV CODE 250 W/ 637 OVERRIDE(OP): Performed by: EMERGENCY MEDICINE

## 2019-11-14 PROCEDURE — 71045 X-RAY EXAM CHEST 1 VIEW: CPT

## 2019-11-14 PROCEDURE — 99285 EMERGENCY DEPT VISIT HI MDM: CPT

## 2019-11-14 PROCEDURE — A9270 NON-COVERED ITEM OR SERVICE: HCPCS | Performed by: INTERNAL MEDICINE

## 2019-11-14 PROCEDURE — 78452 HT MUSCLE IMAGE SPECT MULT: CPT | Mod: 26 | Performed by: INTERNAL MEDICINE

## 2019-11-14 PROCEDURE — 80053 COMPREHEN METABOLIC PANEL: CPT

## 2019-11-14 PROCEDURE — 700102 HCHG RX REV CODE 250 W/ 637 OVERRIDE(OP)

## 2019-11-14 PROCEDURE — 93018 CV STRESS TEST I&R ONLY: CPT | Performed by: INTERNAL MEDICINE

## 2019-11-14 PROCEDURE — 93005 ELECTROCARDIOGRAM TRACING: CPT | Performed by: EMERGENCY MEDICINE

## 2019-11-14 PROCEDURE — A9270 NON-COVERED ITEM OR SERVICE: HCPCS | Performed by: EMERGENCY MEDICINE

## 2019-11-14 RX ORDER — ASPIRIN 600 MG/1
300 SUPPOSITORY RECTAL DAILY
Status: DISCONTINUED | OUTPATIENT
Start: 2019-11-14 | End: 2019-11-14 | Stop reason: HOSPADM

## 2019-11-14 RX ORDER — MORPHINE SULFATE 4 MG/ML
1-4 INJECTION, SOLUTION INTRAMUSCULAR; INTRAVENOUS
Status: DISCONTINUED | OUTPATIENT
Start: 2019-11-14 | End: 2019-11-14 | Stop reason: HOSPADM

## 2019-11-14 RX ORDER — ONDANSETRON 2 MG/ML
4 INJECTION INTRAMUSCULAR; INTRAVENOUS EVERY 4 HOURS PRN
Status: DISCONTINUED | OUTPATIENT
Start: 2019-11-14 | End: 2019-11-14 | Stop reason: HOSPADM

## 2019-11-14 RX ORDER — BISACODYL 10 MG
10 SUPPOSITORY, RECTAL RECTAL
Status: DISCONTINUED | OUTPATIENT
Start: 2019-11-14 | End: 2019-11-14 | Stop reason: HOSPADM

## 2019-11-14 RX ORDER — LIDOCAINE 50 MG/G
1 PATCH TOPICAL EVERY 24 HOURS
Qty: 10 PATCH | Refills: 0 | Status: SHIPPED | OUTPATIENT
Start: 2019-11-14 | End: 2021-03-25

## 2019-11-14 RX ORDER — ASPIRIN 81 MG/1
TABLET, CHEWABLE ORAL
Status: COMPLETED
Start: 2019-11-14 | End: 2019-11-14

## 2019-11-14 RX ORDER — NITROGLYCERIN 0.4 MG/1
0.4 TABLET SUBLINGUAL
Status: DISCONTINUED | OUTPATIENT
Start: 2019-11-14 | End: 2019-11-14 | Stop reason: HOSPADM

## 2019-11-14 RX ORDER — POLYETHYLENE GLYCOL 3350 17 G/17G
1 POWDER, FOR SOLUTION ORAL
Status: DISCONTINUED | OUTPATIENT
Start: 2019-11-14 | End: 2019-11-14 | Stop reason: HOSPADM

## 2019-11-14 RX ORDER — TIZANIDINE 4 MG/1
4 TABLET ORAL EVERY 6 HOURS PRN
Qty: 30 TAB | Refills: 3 | Status: SHIPPED | OUTPATIENT
Start: 2019-11-14 | End: 2021-03-25

## 2019-11-14 RX ORDER — ENALAPRILAT 1.25 MG/ML
1.25 INJECTION INTRAVENOUS EVERY 6 HOURS PRN
Status: DISCONTINUED | OUTPATIENT
Start: 2019-11-14 | End: 2019-11-14 | Stop reason: HOSPADM

## 2019-11-14 RX ORDER — CYCLOBENZAPRINE HCL 10 MG
10 TABLET ORAL 3 TIMES DAILY PRN
Status: DISCONTINUED | OUTPATIENT
Start: 2019-11-14 | End: 2019-11-14 | Stop reason: HOSPADM

## 2019-11-14 RX ORDER — LEVOTHYROXINE SODIUM 0.05 MG/1
100 TABLET ORAL DAILY
Status: DISCONTINUED | OUTPATIENT
Start: 2019-11-14 | End: 2019-11-14 | Stop reason: HOSPADM

## 2019-11-14 RX ORDER — TERAZOSIN 1 MG/1
2 CAPSULE ORAL EVERY EVENING
Status: DISCONTINUED | OUTPATIENT
Start: 2019-11-14 | End: 2019-11-14 | Stop reason: HOSPADM

## 2019-11-14 RX ORDER — ASPIRIN 325 MG
325 TABLET ORAL DAILY
Status: DISCONTINUED | OUTPATIENT
Start: 2019-11-14 | End: 2019-11-14 | Stop reason: HOSPADM

## 2019-11-14 RX ORDER — ACETAMINOPHEN 325 MG/1
650 TABLET ORAL EVERY 6 HOURS PRN
Status: DISCONTINUED | OUTPATIENT
Start: 2019-11-14 | End: 2019-11-14 | Stop reason: HOSPADM

## 2019-11-14 RX ORDER — ASPIRIN 81 MG/1
324 TABLET, CHEWABLE ORAL DAILY
Status: DISCONTINUED | OUTPATIENT
Start: 2019-11-14 | End: 2019-11-14 | Stop reason: HOSPADM

## 2019-11-14 RX ORDER — NITROGLYCERIN 0.4 MG/1
0.4 TABLET SUBLINGUAL ONCE
Status: COMPLETED | OUTPATIENT
Start: 2019-11-14 | End: 2019-11-14

## 2019-11-14 RX ORDER — ONDANSETRON 4 MG/1
4 TABLET, ORALLY DISINTEGRATING ORAL EVERY 4 HOURS PRN
Status: DISCONTINUED | OUTPATIENT
Start: 2019-11-14 | End: 2019-11-14 | Stop reason: HOSPADM

## 2019-11-14 RX ORDER — NITROGLYCERIN 0.4 MG/1
TABLET SUBLINGUAL
Status: COMPLETED
Start: 2019-11-14 | End: 2019-11-14

## 2019-11-14 RX ORDER — ATORVASTATIN CALCIUM 10 MG/1
20 TABLET, FILM COATED ORAL
Status: DISCONTINUED | OUTPATIENT
Start: 2019-11-14 | End: 2019-11-14 | Stop reason: HOSPADM

## 2019-11-14 RX ORDER — AMOXICILLIN 250 MG
2 CAPSULE ORAL 2 TIMES DAILY
Status: DISCONTINUED | OUTPATIENT
Start: 2019-11-14 | End: 2019-11-14 | Stop reason: HOSPADM

## 2019-11-14 RX ADMIN — ASPIRIN 81 MG CHEWABLE TABLET 324 MG: 81 TABLET CHEWABLE at 05:08

## 2019-11-14 RX ADMIN — ASPIRIN 325 MG ORAL TABLET 325 MG: 325 PILL ORAL at 08:39

## 2019-11-14 RX ADMIN — NITROGLYCERIN 0.4 MG: 0.4 TABLET, ORALLY DISINTEGRATING SUBLINGUAL at 05:09

## 2019-11-14 RX ADMIN — NITROGLYCERIN 0.4 MG: 0.4 TABLET, ORALLY DISINTEGRATING SUBLINGUAL at 05:08

## 2019-11-14 RX ADMIN — LEVOTHYROXINE SODIUM 100 MCG: 50 TABLET ORAL at 08:38

## 2019-11-14 RX ADMIN — ATORVASTATIN CALCIUM 20 MG: 10 TABLET, FILM COATED ORAL at 08:38

## 2019-11-14 RX ADMIN — IOHEXOL 100 ML: 350 INJECTION, SOLUTION INTRAVENOUS at 06:08

## 2019-11-14 RX ADMIN — SENNOSIDES AND DOCUSATE SODIUM 2 TABLET: 8.6; 5 TABLET ORAL at 08:39

## 2019-11-14 ASSESSMENT — LIFESTYLE VARIABLES
SUBSTANCE_ABUSE: 0
EVER_SMOKED: YES
TOTAL SCORE: 1
AVERAGE NUMBER OF DAYS PER WEEK YOU HAVE A DRINK CONTAINING ALCOHOL: 6
DO YOU DRINK ALCOHOL: NO
ON A TYPICAL DAY WHEN YOU DRINK ALCOHOL HOW MANY DRINKS DO YOU HAVE: 2
EVER_SMOKED: YES
DOES PATIENT WANT TO STOP DRINKING: NO
TOTAL SCORE: 1
HAVE YOU EVER FELT YOU SHOULD CUT DOWN ON YOUR DRINKING: YES
TOTAL SCORE: 1
HOW MANY TIMES IN THE PAST YEAR HAVE YOU HAD 5 OR MORE DRINKS IN A DAY: 0
EVER FELT BAD OR GUILTY ABOUT YOUR DRINKING: NO
HAVE PEOPLE ANNOYED YOU BY CRITICIZING YOUR DRINKING: NO
ALCOHOL_USE: YES
EVER HAD A DRINK FIRST THING IN THE MORNING TO STEADY YOUR NERVES TO GET RID OF A HANGOVER: NO
CONSUMPTION TOTAL: NEGATIVE

## 2019-11-14 ASSESSMENT — ENCOUNTER SYMPTOMS
NECK PAIN: 0
FEVER: 0
ABDOMINAL PAIN: 0
FALLS: 0
PALPITATIONS: 0
CHILLS: 0
BACK PAIN: 1
COUGH: 0
LOSS OF CONSCIOUSNESS: 0
CLAUDICATION: 0
DIZZINESS: 0
TINGLING: 0
DIAPHORESIS: 0
WEAKNESS: 0
DIARRHEA: 0
MYALGIAS: 0
DEPRESSION: 0
PND: 0
HEADACHES: 0
ORTHOPNEA: 0
STRIDOR: 0
NAUSEA: 0
CONSTIPATION: 0
SPUTUM PRODUCTION: 0
VOMITING: 0
SHORTNESS OF BREATH: 0
SORE THROAT: 0

## 2019-11-14 ASSESSMENT — COGNITIVE AND FUNCTIONAL STATUS - GENERAL
MOBILITY SCORE: 24
SUGGESTED CMS G CODE MODIFIER DAILY ACTIVITY: CH
DAILY ACTIVITIY SCORE: 24
SUGGESTED CMS G CODE MODIFIER MOBILITY: CH

## 2019-11-14 ASSESSMENT — HEART SCORE
AGE: >64
ECG: NORMAL
RISK FACTORS: >2 RISK FACTORS OR HX OF ATHEROSCLEROTIC DISEASE
HISTORY: MODERATELY SUSPICIOUS
HEART SCORE: 6
TROPONIN: 1-3 TIMES NORMAL LIMIT

## 2019-11-14 ASSESSMENT — PATIENT HEALTH QUESTIONNAIRE - PHQ9
SUM OF ALL RESPONSES TO PHQ9 QUESTIONS 1 AND 2: 0
2. FEELING DOWN, DEPRESSED, IRRITABLE, OR HOPELESS: NOT AT ALL
1. LITTLE INTEREST OR PLEASURE IN DOING THINGS: NOT AT ALL

## 2019-11-14 NOTE — ED TRIAGE NOTES
Pt woke about 0330 with stabbing pain between shoulder blades. Had central chest pressure with pain that radiated to hi left shoulder and down left arm 24 hours ago when he woke up yesterday.

## 2019-11-14 NOTE — ED NOTES
Pt describes pain that is intermittent and starts between shoulder blade and radiates to left forearm at 7/10. Given nitroglycerin SL. B/P 128/59

## 2019-11-14 NOTE — PROGRESS NOTES
Patient presents to NM suite for cardiac stress test with MPI. Nursing goals identified: knowledge deficit, potential for anxiety r/t stress test, potential for compromised cardiac output. Care plan includes educating patient, reassurance and access to ACLS cart/team. Labs and ECG reviewed. No caffeine and NPO confirmed. Resting images attained and patient prepped for treadmill stress study. REACHED >85%OF TARGET HR Patient reported these symptoms: ADMISSION PAIN COMPLAINT RESOLVED DURING PEAK EXERCISE AND RETURNED DURING RECOVERY. 2-3/10 THROBBING/ACHING/CONSTANT PAIN IN LEFT SIDE, BACK OF LEFT ARM AND CERVICAL NECK. Water and/or caffeinated beverage provided. Symptoms resolved.

## 2019-11-14 NOTE — ED NOTES
0710 Assumed care briefly Pt AAO VSS Pt sl HTN C/O mild LT post CP that relieves if he lifts his arm over his shoulder Denies SOB

## 2019-11-14 NOTE — ED PROVIDER NOTES
"ED Provider Note     11/14/2019  5:05 AM    Means of Arrival: Walk In  History obtained by: patient  Limitations:      CHIEF COMPLAINT  Chief Complaint   Patient presents with   • Chest Pain   • Back Pain       HPI  Donny Haas is a 79 y.o. male with history of hyperlipidemia, hypertension who presents with concerns of left-sided pressure-like, aching, dull chest pain that radiates to the left shoulder and arm.  He first had episode of symptoms \"26 hours ago.\"  He says that the initial episode lasted several minutes, occurred after waking up early in the morning.  Later in the morning he took his dog for a walk.  He had no chest pain when exerting himself.  He even went to the pull up bars at the park and tried doing some pull-ups.  He had another episode of pain in the early evening.  Pain episodes last for several minutes.  He had 2 drinks of alcohol and went to bed.  He again woke up approximately 2 hours prior to arrival with recurrent pain.  Pain not associated with nausea/vomiting, diaphoresis or shortness of breath.  Episodes of pain he noticed also come on with sneezing, having a bowel movement.  He is also noticed belching that is more frequent than usual.  No abdominal pain.    REVIEW OF SYSTEMS  Review of Systems   Constitutional: Negative for chills, diaphoresis and fever.   HENT: Negative for congestion and sore throat.    Respiratory: Negative for cough and shortness of breath.    Cardiovascular: Positive for chest pain. Negative for palpitations, orthopnea, claudication, leg swelling and PND.   Gastrointestinal: Negative for abdominal pain, nausea and vomiting.   Musculoskeletal: Positive for back pain. Negative for neck pain.   Neurological: Negative for dizziness and headaches.   Psychiatric/Behavioral: Negative for substance abuse.   All other systems reviewed and are negative.    See HPI for further details.    PAST MEDICAL HISTORY   has a past medical history of Hyperlipidemia, " "Hypertension, and Thyroid disease.    SOCIAL HISTORY  Social History     Tobacco Use   • Smoking status: Former Smoker     Packs/day: 1.00     Years: 19.00     Pack years: 19.00     Types: Cigarettes     Last attempt to quit: 3/22/1979     Years since quittin.6   • Smokeless tobacco: Never Used   Substance and Sexual Activity   • Alcohol use: Yes     Alcohol/week: 0.0 oz     Comment: 4 drinks a week.   • Drug use: No   • Sexual activity: Yes     Partners: Female       SURGICAL HISTORY   has a past surgical history that includes dental surgery; tonsillectomy; and hernia repair.    CURRENT MEDICATIONS  Home Medications    **Home medications have not yet been reviewed for this encounter**         ALLERGIES  Allergies   Allergen Reactions   • Penicillins Rash   • Latex        PHYSICAL EXAM  VITAL SIGNS: BP (!) 167/82   Pulse 61   Temp 36.1 °C (97 °F) (Temporal)   Resp 20   Ht 1.753 m (5' 9\")   Wt 103.4 kg (228 lb)   SpO2 99%   BMI 33.67 kg/m²     Pulse ox interpretation: I interpret this pulse ox as normal.  Constitutional: Alert in no apparent distress.  Overweight 79-year-old man, pleasant.  HENT: No signs of trauma, Bilateral external ears normal, Nose normal.   Eyes: Pupils are equal, Conjunctiva normal, Non-icteric.   Neck: Normal range of motion, No tenderness, Supple, No stridor.   Cardiovascular: Regular rate and rhythm, no murmurs. Symmetric distal pulses at bilateral radial pulses, posterior tibialis pulses. No cyanosis of extremities. No peripheral edema of extremities.  Thorax & Lungs: Normal breath sounds, No respiratory distress, No wheezing, No chest tenderness.   Abdomen:  Soft, No tenderness, No masses, No pulsatile masses. No peritoneal signs.  Skin: Warm, Dry, No erythema, No rash.   Back: No midline bony tenderness, No CVA tenderness.  Unable to reproduce back pain with palpation.  Musculoskeletal: Good range of motion in all major joints. No tenderness to palpation or major deformities " noted.   Neurologic: Alert , Normal motor function, Normal sensory function, No focal deficits noted.   Psychiatric: Affect normal, Judgment normal, Mood normal.   Physical Exam      DIAGNOSTIC STUDIES / PROCEDURES    EKG  Results for orders placed or performed during the hospital encounter of 19   EKG   Result Value Ref Range    Report       [facility] Emergency Dept.    Test Date:  2019  Pt Name:    GUERRERO NICOLE        Department: [facility]  MRN:        [id number]                      Room:       [place] [address]  Gender:     Male                         Technician: PAULY  :        1940                   Requested By:REYNOLD STEPHENSON II  Order #:    [id number]                    Reading MD: Reynold Stephenson II, MD    Measurements  Intervals                                Axis  Rate:       84                           P:          35  MO:         165                          QRS:        44  QRSD:       103                          T:          57  QT:         424  QTc:        502    Interpretive Statements  Sinus rhythm  Rate 84  Upright T waves.  No ST elevation or depression.  Artifact in V3.  Prolonged QT interval.  Sinus rhythm EKG with slightly prolonged QT.  No STEMI.  No previous ECG available for comparison  Electronically Signed On 2019 5:13:58 PST by Reynold Stephenson II, MD     EKG   Result Value Ref Range    Report       [facility] Emergency Dept.    Test Date:  2019  Pt Name:    [facility]        Department: [facility]  MRN:        [id number]                      Room:       [facility]  Gender:     Male                         Technician: PAULY  :        1940                   Requested By:REYNOLD STEPHENSON II  Order #:    [id number]                    Reading MD:    Measurements  Intervals                                Axis  Rate:       65                           P:          47  MO:         173                           QRS:        34  QRSD:       103                          T:          56  QT:         450  QTc:        468    Interpretive Statements  Sinus rhythm  Compared to ECG 11/14/2019 04:56:37  ST (T wave) deviation no longer present  Prolonged QT interval no longer present           LABS  Pertinent Labs & Imaging studies reviewed. (See chart for details)    RADIOLOGY  Pertinent Labs & Imaging studies reviewed. (See chart for details)    COURSE & MEDICAL DECISION MAKING  Pertinent Labs & Imaging studies reviewed. (See chart for details)    5:05 AM This is an emergent evaluation of a  79 y.o. male who presents with concerns of left-sided chest pain that started 26 hours ago.  Pain initially present on arrival then resolved.  It then returned after just a few minutes.  He has no diaphoresis, nausea or vomiting.  His pulses are all symmetric.  We checked blood pressures in bilateral upper extremities and there was no significant difference.  Abdomen is soft nontender without palpable masses.  Differential diagnosis includes MI, hypertensive emergency, radiculopathy, muscle spasm, GERD, lower suspicion for dissection given no difference in blood pressures in the upper extremities (but still in differential given symptoms of radiation to the back and hypertension).  He received aspirin and nitroglycerin here in the emergency department.  EKG does not show STEMI or significant abnormalities to suggest ischemia.  Work-up includes CBC, CMP, troponin, chest x-ray.    5:36 AM  Pain improved after nitroglycerin.  Repeat EKG with similar appearance to prior.  No STEMI.  QTc now normal.  Continues to have intermittent episodes of pain, with complaints of radiation to the back by the shoulder blades.  Chest x-ray done mediastinum not obviously widened but aorta is tortuous.  Despite having findings that are not consistent with aortic dissection such as equal blood pressures in upper extremities I am still concerned for possible dissection  because of described symptoms, hypertension.  He also has slightly elevated troponin of 28.  Heart score is 6.    6:19 AM  CTA aorta does not show any signs of dissection. He will be admitted for further evaluation of chest pain. Dr. Dunn, hospitalist, has kindly agreed to admit.         FINAL IMPRESSION    ICD-10-CM   1. Acute chest pain R07.9            This dictation was created using voice recognition software. The accuracy of the dictation is limited to the abilities of the software. I expect there may be some errors of grammar and possibly content. The nursing notes were reviewed and certain aspects of this information were incorporated into this note.    Electronically signed by: Reynold Sears II, 11/14/2019 5:05 AM

## 2019-11-14 NOTE — ASSESSMENT & PLAN NOTE
-Continue home Hytrin  -Bladder wall thickening was noted on the CT scan, obtain post void residuals

## 2019-11-14 NOTE — DISCHARGE INSTRUCTIONS
Discharge Instructions    Discharged to home by car with relative. Discharged via walking, hospital escort: Yes.  Special equipment needed: Cane    Be sure to schedule a follow-up appointment with your primary care doctor or any specialists as instructed.     Discharge Plan:   Influenza Vaccine Indication: Not indicated: Previously immunized this influenza season and > 8 years of age    I understand that a diet low in cholesterol, fat, and sodium is recommended for good health. Unless I have been given specific instructions below for another diet, I accept this instruction as my diet prescription.   Other diet: Cardiac    Special Instructions: None    · Is patient discharged on Warfarin / Coumadin?   No     Depression / Suicide Risk    As you are discharged from this [facility] facility, it is important to learn how to keep safe from harming yourself.    Recognize the warning signs:  · Abrupt changes in personality, positive or negative- including increase in energy   · Giving away possessions  · Change in eating patterns- significant weight changes-  positive or negative  · Change in sleeping patterns- unable to sleep or sleeping all the time   · Unwillingness or inability to communicate  · Depression  · Unusual sadness, discouragement and loneliness  · Talk of wanting to die  · Neglect of personal appearance   · Rebelliousness- reckless behavior  · Withdrawal from people/activities they love  · Confusion- inability to concentrate     If you or a loved one observes any of these behaviors or has concerns about self-harm, here's what you can do:  · Talk about it- your feelings and reasons for harming yourself  · Remove any means that you might use to hurt yourself (examples: pills, rope, extension cords, firearm)  · Get professional help from the community (Mental Health, Substance Abuse, psychological counseling)  · Do not be alone:Call your Safe Contact- someone whom you trust who will be there for you.  · Call  your local CRISIS HOTLINE 962-0897 or 433-585-6615  · Call your local Children's Mobile Crisis Response Team [place] (013) 288-9665 or www.[employer]  · Call the toll free National Suicide Prevention Hotlines   · National Suicide Prevention Lifeline 654-992-PGVT (5246)  · National Hope Line Network 800-SUICIDE (149-5021)      Discharge Instructions per Megha Bae M.D.    Follow up with primary care as needed.    DIET: regular.    ACTIVITY: as tolerated.    DIAGNOSIS: chest pain, not cardiac    Return to ER if you have chest pain that is different or won't go away, difficulty breathing, fever.

## 2019-11-14 NOTE — H&P
Hospital Medicine History & Physical Note    Date of Service  11/14/2019    Primary Care Physician  Kenneth Guzmán M.D.    Consultants  None    Code Status  Full    Chief Complaint  Chest/back pain    History of Presenting Illness  79 y.o. male who presented 11/14/2019 with chest and back pain.  Patient states his symptoms started a couple days ago around 330, waking him up.  He states it was located on the left side of his chest with radiation to his left arm and his back.  He described it as a pressure, 7/10.  He states it lasted for around 10 minutes then went away so he went back to sleep.  He states that day he went for a walk that was about a mile and a half, he also did some pull ups and push-ups and did not have any pain during this exercise.  He states then yesterday morning around the same time he woke up with similar pain but this time it was not so much chest it was more in the back.  He states it persisted through the day now will not go away so he presented to the emergency department.  He denied any nausea, vomiting, shortness of breath or diaphoresis.  He states he had a similar episode around a month ago but that was much milder.  He states he has been belching which improves the pain.  He also states the pain is worse with sneezing or coughing.  I did discuss the case including labs and imaging with the ER physician.    Review of Systems  Review of Systems   Constitutional: Negative for chills, fever and malaise/fatigue.   HENT: Negative for congestion.    Respiratory: Negative for cough, sputum production, shortness of breath and stridor.    Cardiovascular: Positive for chest pain. Negative for palpitations and leg swelling.   Gastrointestinal: Negative for abdominal pain, constipation, diarrhea, nausea and vomiting.   Genitourinary: Negative for dysuria and urgency.   Musculoskeletal: Positive for back pain. Negative for falls and myalgias.   Neurological: Negative for dizziness, tingling, loss  of consciousness, weakness and headaches.   Psychiatric/Behavioral: Negative for depression and suicidal ideas.   All other systems reviewed and are negative.      Past Medical History   has a past medical history of Hyperlipidemia, Hypertension, and Thyroid disease.    Surgical History   has a past surgical history that includes dental surgery; tonsillectomy; and hernia repair.     Family History  family history includes Cancer in his mother; Diabetes in his brother and sister; Heart Attack in his maternal grandfather; No Known Problems in his maternal grandmother; Thyroid in his brother and sister.     Social History   reports that he quit smoking about 40 years ago. His smoking use included cigarettes. He has a 19.00 pack-year smoking history. He has never used smokeless tobacco. He reports current alcohol use. He reports that he does not use drugs.    Allergies  Allergies   Allergen Reactions   • Penicillins Rash   • Latex        Medications  Prior to Admission Medications   Prescriptions Last Dose Informant Patient Reported? Taking?   atorvastatin (LIPITOR) 20 MG Tab   No No   Sig: TAKE 1 TABLET BY MOUTH EVERY DAY   levothyroxine (SYNTHROID) 100 MCG Tab   No No   Sig: TAKE 1 TABLET BY MOUTH EVERY 24 HOURS   terazosin (HYTRIN) 2 MG Cap   No No   Sig: TAKE 1 CAPSULE BY MOUTH EVERY 24 HOURS   vitamin D, Ergocalciferol, (DRISDOL) 21586 UNITS CAPS capsule   No No   Sig: Take 1 Cap by mouth every 7 days.      Facility-Administered Medications: None       Physical Exam  Temp:  [36.1 °C (97 °F)] 36.1 °C (97 °F)  Pulse:  [61-89] 61  Resp:  [11-31] 20  BP: (128-176)/(59-87) 167/82  SpO2:  [91 %-99 %] 99 %    Physical Exam  Vitals signs and nursing note reviewed.   Constitutional:       General: He is not in acute distress.     Appearance: He is well-developed. He is not toxic-appearing or diaphoretic.   HENT:      Head: Normocephalic and atraumatic.      Right Ear: External ear normal.      Left Ear: External ear normal.       Nose: Nose normal. No congestion or rhinorrhea.      Mouth/Throat:      Mouth: Mucous membranes are moist.      Pharynx: No oropharyngeal exudate or posterior oropharyngeal erythema.   Eyes:      General: No scleral icterus.        Right eye: No discharge.         Left eye: No discharge.      Pupils: Pupils are equal, round, and reactive to light.   Neck:      Musculoskeletal: Normal range of motion and neck supple. No edema or erythema.      Trachea: No tracheal deviation.   Cardiovascular:      Rate and Rhythm: Normal rate and regular rhythm.      Heart sounds: No murmur. No friction rub. No gallop.    Pulmonary:      Effort: Pulmonary effort is normal. No respiratory distress.      Breath sounds: Normal breath sounds. No stridor. No wheezing or rales.   Chest:      Chest wall: No tenderness.   Abdominal:      General: Bowel sounds are normal. There is no distension.      Palpations: Abdomen is soft.      Tenderness: There is no tenderness. There is no guarding or rebound.   Musculoskeletal: Normal range of motion.         General: No tenderness.      Right lower leg: No edema.      Left lower leg: No edema.   Lymphadenopathy:      Cervical: No cervical adenopathy.   Skin:     General: Skin is warm and dry.      Coloration: Skin is not jaundiced.      Findings: No erythema or rash.   Neurological:      General: No focal deficit present.      Mental Status: He is alert and oriented to person, place, and time.      Cranial Nerves: No cranial nerve deficit.   Psychiatric:         Mood and Affect: Mood normal.         Behavior: Behavior normal.         Thought Content: Thought content normal.         Judgment: Judgment normal.         Laboratory:  Recent Labs     11/14/19  0500   WBC 4.4*   RBC 4.67*   HEMOGLOBIN 15.0   HEMATOCRIT 44.3   MCV 94.9   MCH 32.1   MCHC 33.9   RDW 46.6   PLATELETCT 201   MPV 10.3     Recent Labs     11/14/19  0500   SODIUM 138   POTASSIUM 3.8   CHLORIDE 103   CO2 23   GLUCOSE 114*    BUN 16   CREATININE 1.16   CALCIUM 8.9     Recent Labs     11/14/19  0500   ALTSGPT 10   ASTSGOT 16   ALKPHOSPHAT 103*   TBILIRUBIN 0.3   GLUCOSE 114*         No results for input(s): NTPROBNP in the last 72 hours.      Recent Labs     11/14/19  0500   TROPONINT 28*       Urinalysis:    No results found     Imaging:  CT-CTA COMPLETE THORACOABDOMINAL AORTA   Final Result         1.  No visualized aneurysm or dissection.   2.  Atherosclerosis and atherosclerotic coronary artery disease.   3.  Diverticulosis.   4.  Bladder wall thickening, consider urinary outflow tract obstructive changes versus cystitis in the appropriate clinical setting.            DX-CHEST-PORTABLE (1 VIEW)   Final Result         1.  Bilateral basilar atelectasis, no focal infiltrate   2.  Atherosclerosis      NM-CARDIAC STRESS TEST    (Results Pending)         Assessment/Plan:  I anticipate this patient is appropriate for observation status at this time.    Chest pain  Assessment & Plan  -With radiation to his back as well as his left arm, CT of the chest was negative for dissection  -Patient certainly is at high risk for ACS, does require close monitoring  -Does have a mild increase in his troponin  -Trend troponin, repeat EKG and monitor on telemetry  -If no worsening, obtain a stress test  -Start GI cocktail, morphine as well as nitro  -Also try Flexeril  -I did personally review his chest x-ray, noted no acute cardiopulmonary process  -I also personally reviewed his EKG, noted sinus rhythm with no ST changes    Benign prostatic hyperplasia without lower urinary tract symptoms- (present on admission)  Assessment & Plan  -Continue home Hytrin  -Bladder wall thickening was noted on the CT scan, obtain post void residuals    Essential hypertension- (present on admission)  Assessment & Plan  -Patient only takes Hytrin  -Place PRN enalapril  -Adjust as needed  -Blood pressure is currently elevated, if it remains this way, he may benefit from  antihypertensives at home    Acquired hypothyroidism- (present on admission)  Assessment & Plan  -Continue home Synthroid at 100 mcg daily  -Most recent TSH was around 5 months ago was normal at 3.12    Hyperlipidemia with target LDL less than 100- (present on admission)  Assessment & Plan  -Continue home statin      VTE prophylaxis: Lovenox

## 2019-11-14 NOTE — DISCHARGE SUMMARY
Discharge Summary    CHIEF COMPLAINT ON ADMISSION  Chief Complaint   Patient presents with   • Chest Pain   • Back Pain       Reason for Admission  Back Pain     Admission Date  11/14/2019    CODE STATUS  Full Code    HPI & HOSPITAL COURSE  History of Presenting Illness  79 y.o. male who presented 11/14/2019 with chest and back pain.  Patient states his symptoms started a couple days ago around 330, waking him up.  He states it was located on the left side of his chest with radiation to his left arm and his back.  He described it as a pressure, 7/10.  He states it lasted for around 10 minutes then went away so he went back to sleep.  He states that day he went for a walk that was about a mile and a half, he also did some pull ups and push-ups and did not have any pain during this exercise.  He states then yesterday morning around the same time he woke up with similar pain but this time it was not so much chest it was more in the back.  He states it persisted through the day now will not go away so he presented to the emergency department.  He denied any nausea, vomiting, shortness of breath or diaphoresis.  He states he had a similar episode around a month ago but that was much milder.  He states he has been belching which improves the pain.  He also states the pain is worse with sneezing or coughing.       HOSPITAL COURSE: There was worry that this was an anginal equivalent, patient went through a myocardial perfusion imaging study which was negative.  He remained stable on telemetry.  He had tenderness and spasm in the left rhomboid muscle.  I was able to reproduce his same symptoms with palpation of this muscle.  I prescribed the patient a lidocaine patch and muscle relaxants with instructions to follow-up with his primary care provider if this did not improve his discomfort.  Trigger point injection sometimes can be helpful in this situation.    Therefore, he is discharged in good and stable condition to home  with close outpatient follow-up.      Discharge Date  11/14/19    FOLLOW UP ITEMS POST DISCHARGE  Primary care prn.    DISCHARGE DIAGNOSES  Active Problems:    Chest pain POA: Unknown    Hyperlipidemia with target LDL less than 100 POA: Yes      Overview: ICD-10 transition    Acquired hypothyroidism POA: Yes    Essential hypertension POA: Yes    Benign prostatic hyperplasia without lower urinary tract symptoms POA: Yes  Resolved Problems:    * No resolved hospital problems. *      FOLLOW UP  No future appointments.  No follow-up provider specified.    MEDICATIONS ON DISCHARGE     Medication List      START taking these medications      Instructions   lidocaine 5 % Ptch  Commonly known as:  LIDODERM   Apply 1 Patch to skin as directed every 24 hours.  Dose:  1 Patch     tizanidine 4 MG Tabs  Commonly known as:  ZANAFLEX   Take 1 Tab by mouth every 6 hours as needed.  Dose:  4 mg        CONTINUE taking these medications      Instructions   atorvastatin 20 MG Tabs  Commonly known as:  LIPITOR   TAKE 1 TABLET BY MOUTH EVERY DAY     levothyroxine 100 MCG Tabs  Commonly known as:  SYNTHROID   TAKE 1 TABLET BY MOUTH EVERY 24 HOURS     terazosin 2 MG Caps  Commonly known as:  HYTRIN   TAKE 1 CAPSULE BY MOUTH EVERY 24 HOURS     vitamin D (Ergocalciferol) 06351 units Caps capsule  Commonly known as:  DRISDOL   Take 1 Cap by mouth every 7 days.  Dose:  50,000 Units            Allergies  Allergies   Allergen Reactions   • Penicillins Rash   • Latex        DIET  Orders Placed This Encounter   Procedures   • Diet Order Cardiac     Standing Status:   Standing     Number of Occurrences:   1     Order Specific Question:   Diet:     Answer:   Cardiac [6]     Order Specific Question:   Miscellaneous modifications:     Answer:   No Decaf, No Caffeine(for test) [11]       ACTIVITY  As tolerated.  Weight bearing as tolerated    CONSULTATIONS  None.    PROCEDURES  None.    LABORATORY  Lab Results   Component Value Date    SODIUM 138  11/14/2019    POTASSIUM 3.8 11/14/2019    CHLORIDE 103 11/14/2019    CO2 23 11/14/2019    GLUCOSE 114 (H) 11/14/2019    BUN 16 11/14/2019    CREATININE 1.16 11/14/2019        Lab Results   Component Value Date    WBC 4.4 (L) 11/14/2019    HEMOGLOBIN 15.0 11/14/2019    HEMATOCRIT 44.3 11/14/2019    PLATELETCT 201 11/14/2019

## 2019-11-14 NOTE — ASSESSMENT & PLAN NOTE
-Patient only takes Hytrin  -Place PRN enalapril  -Adjust as needed  -Blood pressure is currently elevated, if it remains this way, he may benefit from antihypertensives at home

## 2019-11-14 NOTE — ASSESSMENT & PLAN NOTE
-Continue home Synthroid at 100 mcg daily  -Most recent TSH was around 5 months ago was normal at 3.12

## 2019-11-14 NOTE — PROGRESS NOTES
Received patient from ED; admission done. Patient assessed. IV site verified, tele on. Patient instructed to call before getting up. Call light placed close. POC discussed with patient; no questions at this time.

## 2019-11-14 NOTE — ASSESSMENT & PLAN NOTE
-With radiation to his back as well as his left arm, CT of the chest was negative for dissection  -Patient certainly is at high risk for ACS, does require close monitoring  -Does have a mild increase in his troponin  -Trend troponin, repeat EKG and monitor on telemetry  -If no worsening, obtain a stress test  -Start GI cocktail, morphine as well as nitro  -Also try Flexeril  -I did personally review his chest x-ray, noted no acute cardiopulmonary process  -I also personally reviewed his EKG, noted sinus rhythm with no ST changes

## 2019-11-14 NOTE — ED NOTES
Pt describes a multitude of sx to include visual disturbances. States that straight lines appear wavy. When asked to clarify, he states that bottom edge of light fixture in room, is wavy when we would know it to be straight.

## 2019-11-15 NOTE — PROGRESS NOTES
Discharged orders received; patient verbalized understanding of d/c instructions, follow-ip appointment and prescriptions. IVx1 removed and intact, tele removed. Patient ambulated self out with spouse by side.

## 2020-02-07 ENCOUNTER — OFFICE VISIT (OUTPATIENT)
Dept: MEDICAL GROUP | Age: 80
End: 2020-02-07
Payer: MEDICARE

## 2020-02-07 ENCOUNTER — HOSPITAL ENCOUNTER (OUTPATIENT)
Dept: LAB | Facility: MEDICAL CENTER | Age: 80
End: 2020-02-07
Attending: FAMILY MEDICINE
Payer: MEDICARE

## 2020-02-07 VITALS
SYSTOLIC BLOOD PRESSURE: 146 MMHG | HEIGHT: 69 IN | WEIGHT: 221.8 LBS | DIASTOLIC BLOOD PRESSURE: 70 MMHG | OXYGEN SATURATION: 94 % | BODY MASS INDEX: 32.85 KG/M2 | TEMPERATURE: 97.5 F | HEART RATE: 70 BPM

## 2020-02-07 DIAGNOSIS — Z00.00 ANNUAL PHYSICAL EXAM: ICD-10-CM

## 2020-02-07 DIAGNOSIS — E03.9 ACQUIRED HYPOTHYROIDISM: ICD-10-CM

## 2020-02-07 DIAGNOSIS — L30.9 DERMATITIS: ICD-10-CM

## 2020-02-07 DIAGNOSIS — Z02.5 SPORTS PHYSICAL: ICD-10-CM

## 2020-02-07 DIAGNOSIS — G47.30 SLEEP APNEA, UNSPECIFIED TYPE: ICD-10-CM

## 2020-02-07 LAB
ERYTHROCYTE [DISTWIDTH] IN BLOOD BY AUTOMATED COUNT: 49.7 FL (ref 35.9–50)
HCT VFR BLD AUTO: 45.8 % (ref 42–52)
HGB BLD-MCNC: 15.1 G/DL (ref 14–18)
MCH RBC QN AUTO: 32.4 PG (ref 27–33)
MCHC RBC AUTO-ENTMCNC: 33 G/DL (ref 33.7–35.3)
MCV RBC AUTO: 98.3 FL (ref 81.4–97.8)
PLATELET # BLD AUTO: 223 K/UL (ref 164–446)
PMV BLD AUTO: 10.7 FL (ref 9–12.9)
PSA SERPL-MCNC: 4.69 NG/ML (ref 0–4)
RBC # BLD AUTO: 4.66 M/UL (ref 4.7–6.1)
TSH SERPL DL<=0.005 MIU/L-ACNC: 2.22 UIU/ML (ref 0.38–5.33)
WBC # BLD AUTO: 5.7 K/UL (ref 4.8–10.8)

## 2020-02-07 PROCEDURE — 80053 COMPREHEN METABOLIC PANEL: CPT

## 2020-02-07 PROCEDURE — 99214 OFFICE O/P EST MOD 30 MIN: CPT | Performed by: FAMILY MEDICINE

## 2020-02-07 PROCEDURE — 36415 COLL VENOUS BLD VENIPUNCTURE: CPT

## 2020-02-07 PROCEDURE — 84443 ASSAY THYROID STIM HORMONE: CPT

## 2020-02-07 PROCEDURE — 84153 ASSAY OF PSA TOTAL: CPT | Mod: GA

## 2020-02-07 PROCEDURE — 85027 COMPLETE CBC AUTOMATED: CPT

## 2020-02-07 PROCEDURE — 80061 LIPID PANEL: CPT

## 2020-02-07 RX ORDER — CLOBETASOL PROPIONATE 0.5 MG/G
CREAM TOPICAL
Qty: 50 G | Refills: 1 | Status: SHIPPED | OUTPATIENT
Start: 2020-02-07 | End: 2021-05-25

## 2020-02-07 ASSESSMENT — PATIENT HEALTH QUESTIONNAIRE - PHQ9: CLINICAL INTERPRETATION OF PHQ2 SCORE: 0

## 2020-02-07 NOTE — PROGRESS NOTES
This medical record contains text that has been entered with the assistance of computer voice recognition and dictation software.  Therefore, it may contain unintended errors in text, spelling, punctuation, or grammar    Chief Complaint   Patient presents with   • Annual Wellness Visit   • Lab Results     also immaging results         Donny Haas is a 79 y.o. male here evaluation and management of: establish care       HPI:      1. Sports physical    Patient presents for an annual sports physical. 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   He is a  and raises maybe 2-3 times per year.  Overall he feels very able to continue this passionate support.  He has no history of syncope, no history of coronary artery disease, no vision problems no valvular problems no history of renal failure no history of stroke.       2. Dermatitis  NEW PROBLEM    Patient has been having dry skin with flaking on his arms and face.He notes that his skin has been flaking off in large chunks however he is in the healing phase now. He has not been seen by dermatology. Endorses itching but denies pain or bleeding.      3. Acquired hypothyroidism    The patient has a history of hypothyroidism. He takes Levothyroxine 20 mg as prescribed without side effects. He described that he has been sleeping more than normal which he thinks may be secondary to his thyroid.    4. Sleep apnea, unspecified type    Patient has a history of sleep apnea. He endorses using a C PAP machine at home as prescribed without complications. Patient endorses sleeping more than normal.       Current medicines (including changes today)  Current Outpatient Medications   Medication Sig Dispense Refill   • clobetasol (TEMOVATE) 0.05 % Cream AAA BID UP TO 14 DAYS THEN STOP 50 g 1   • levothyroxine (SYNTHROID) 100 MCG Tab TAKE 1 TABLET BY MOUTH EVERY 24 HOURS 90 Tab 2   • atorvastatin (LIPITOR) 20 MG Tab TAKE 1 TABLET BY  MOUTH EVERY DAY 90 Tab 2   • vitamin D, Ergocalciferol, (DRISDOL) 54187 UNITS CAPS capsule Take 1 Cap by mouth every 7 days. 8 Cap 0   • lidocaine (LIDODERM) 5 % Patch Apply 1 Patch to skin as directed every 24 hours. (Patient not taking: Reported on 2020) 10 Patch 0   • tizanidine (ZANAFLEX) 4 MG Tab Take 1 Tab by mouth every 6 hours as needed. (Patient not taking: Reported on 2020) 30 Tab 3   • terazosin (HYTRIN) 2 MG Cap TAKE 1 CAPSULE BY MOUTH EVERY 24 HOURS (Patient not taking: Reported on 2020) 90 Cap 2     No current facility-administered medications for this visit.      He  has a past medical history of Hyperlipidemia, Hypertension, and Thyroid disease.  He  has a past surgical history that includes dental surgery; tonsillectomy; and hernia repair.  Social History     Tobacco Use   • Smoking status: Former Smoker     Packs/day: 1.00     Years: 19.00     Pack years: 19.00     Types: Cigarettes     Last attempt to quit: 3/22/1979     Years since quittin.9   • Smokeless tobacco: Never Used   Substance Use Topics   • Alcohol use: Yes     Alcohol/week: 0.0 oz     Comment: 4 drinks a week.   • Drug use: No     Social History     Patient does not qualify to have social determinant information on file (likely too young).   Social History Narrative   • Not on file     Family History   Problem Relation Age of Onset   • Cancer Mother         breast cancer   • Thyroid Sister         Hasimotos   • Diabetes Sister         Type 2 DM   • Thyroid Brother         Hasimotos   • Diabetes Brother         Type 2 DM   • No Known Problems Maternal Grandmother    • Heart Attack Maternal Grandfather      Family Status   Relation Name Status   • Mo   at age 58   • Fa   at age 86   • Sis  Alive   • Bro  Alive   • MGMo     • MGFa     • PGMo unknown    • PGFa unknown          ROS    Please see hpi     All other systems reviewed and are negative     Objective:     /70  "(BP Location: Left arm, Patient Position: Sitting, BP Cuff Size: Adult)   Pulse 70   Temp 36.4 °C (97.5 °F) (Temporal)   Ht 1.753 m (5' 9\")   Wt 100.6 kg (221 lb 12.8 oz)   SpO2 94%  Body mass index is 32.75 kg/m².  Physical Exam:    Constitutional: Alert, no distress.  Skin:   After informed verbal/appropriate consent patient agreed to proceed with clinical photo to compare to future picture and follow up on progression of disease              Eye: Equal, round and reactive, conjunctiva clear, lids normal.  ENMT: Lips without lesions, good dentition, oropharynx clear.  Neck: Trachea midline, no masses, no thyromegaly. No cervical or supraclavicular lymphadenopathy.  Respiratory: Unlabored respiratory effort, lungs clear to auscultation, no wheezes, no ronchi.  Cardiovascular: Normal S1, S2, no murmur, no edema.  Psych: Alert and oriented x3, normal affect and mood.      Assessment and Plan:   The following treatment plan was discussed    1. Sports physical    Completed patients paperwork for race driving which will be scanned into his chart.    2. Dermatitis    I provided the patient with a referral to dermatology for further evaluation of his symptoms. Additionally I prescribed Clobetasol cream to use until he can be seen by dermatology.     - REFERRAL TO DERMATOLOGY  - clobetasol (TEMOVATE) 0.05 % Cream; AAA BID UP TO 14 DAYS THEN STOP  Dispense: 50 g; Refill: 1    3. Annual physical exam    Completed yearly physical exam. We will obtain updated labs.     - Comp Metabolic Panel; Future  - CBC WITHOUT DIFFERENTIAL; Future  - Lipid Profile; Future  - PROSTATE SPECIFIC AG DIAGNOSTIC; Future    4. Acquired hypothyroidism    We will evaluate the patients TSH to see if this could be the cause of his increased fatigue.    - TSH WITH REFLEX TO FT4; Future    5. Sleep apnea, unspecified type    Stable. Advised patient to continue using his C PAP machine as instructed.      HEALTH MAINTENANCE: Patient is due for AWV, " Shingles vaccines, and Influenza vaccine.    Instructed to Follow up in clinic or ER for worsening symptoms, difficulty breathing, lack of expected recovery, or should new symptoms or problems arise.    Followup: Return in about 6 months (around 8/7/2020) for Reevaluation, labs.      Once again this medical record contains text that has been entered with the assistance of computer voice recognition, dictation software, and medical scribes.  Therefore, it may contain unintended errors in text, spelling, punctuation, or grammar.    IPoonam (Scribe), am scribing for, and in the presence of, Kenneth Guzmán M.D.    Electronically signed by: Poonam Driver (Rodrigoibe), 2/7/2020     Kenneth MULLINS M.D. personally performed the services described in this documentation, as scribed by Poonam Driver in my presence, and it is both accurate and complete.

## 2020-02-08 LAB
ALBUMIN SERPL BCP-MCNC: 4.1 G/DL (ref 3.2–4.9)
ALBUMIN/GLOB SERPL: 1.6 G/DL
ALP SERPL-CCNC: 87 U/L (ref 30–99)
ALT SERPL-CCNC: 16 U/L (ref 2–50)
ANION GAP SERPL CALC-SCNC: 10 MMOL/L (ref 0–11.9)
AST SERPL-CCNC: 20 U/L (ref 12–45)
BILIRUB SERPL-MCNC: 0.8 MG/DL (ref 0.1–1.5)
BUN SERPL-MCNC: 22 MG/DL (ref 8–22)
CALCIUM SERPL-MCNC: 8.7 MG/DL (ref 8.5–10.5)
CHLORIDE SERPL-SCNC: 103 MMOL/L (ref 96–112)
CHOLEST SERPL-MCNC: 143 MG/DL (ref 100–199)
CO2 SERPL-SCNC: 24 MMOL/L (ref 20–33)
CREAT SERPL-MCNC: 1.3 MG/DL (ref 0.5–1.4)
FASTING STATUS PATIENT QL REPORTED: NORMAL
GLOBULIN SER CALC-MCNC: 2.6 G/DL (ref 1.9–3.5)
GLUCOSE SERPL-MCNC: 96 MG/DL (ref 65–99)
HDLC SERPL-MCNC: 66 MG/DL
LDLC SERPL CALC-MCNC: 66 MG/DL
POTASSIUM SERPL-SCNC: 4.3 MMOL/L (ref 3.6–5.5)
PROT SERPL-MCNC: 6.7 G/DL (ref 6–8.2)
SODIUM SERPL-SCNC: 137 MMOL/L (ref 135–145)
TRIGL SERPL-MCNC: 56 MG/DL (ref 0–149)

## 2020-02-10 ENCOUNTER — TELEPHONE (OUTPATIENT)
Dept: MEDICAL GROUP | Age: 80
End: 2020-02-10

## 2020-02-10 NOTE — TELEPHONE ENCOUNTER
How would the patient prefer to be contacted with a response: Phone call OK to leave a detailed message    Clobetasol Prop 0.05% Cream 45gm is not covered by patient plan. Preferred alternative is MOMETASONEFUROATE, TRIAMCINOLONEACETONIDE, BETAMETHASONEDIPROPIONATE.       Please advise, will call into pharmacy.

## 2020-02-12 RX ORDER — BETAMETHASONE DIPROPIONATE 0.05 %
OINTMENT (GRAM) TOPICAL
Qty: 1 TUBE | Refills: 0 | Status: SHIPPED | OUTPATIENT
Start: 2020-02-12 | End: 2021-07-07

## 2020-06-23 RX ORDER — TERAZOSIN 2 MG/1
CAPSULE ORAL
Qty: 90 CAP | Refills: 2 | Status: SHIPPED | OUTPATIENT
Start: 2020-06-23 | End: 2021-03-16

## 2020-06-23 RX ORDER — ATORVASTATIN CALCIUM 20 MG/1
TABLET, FILM COATED ORAL
Qty: 90 TAB | Refills: 2 | Status: SHIPPED | OUTPATIENT
Start: 2020-06-23 | End: 2021-03-17

## 2020-06-23 RX ORDER — LEVOTHYROXINE SODIUM 0.1 MG/1
TABLET ORAL
Qty: 90 TAB | Refills: 2 | Status: SHIPPED | OUTPATIENT
Start: 2020-06-23 | End: 2021-02-16

## 2020-11-11 ENCOUNTER — HOSPITAL ENCOUNTER (OUTPATIENT)
Dept: LAB | Facility: MEDICAL CENTER | Age: 80
End: 2020-11-11
Attending: INTERNAL MEDICINE
Payer: MEDICARE

## 2020-11-11 LAB — COVID ORDER STATUS COVID19: NORMAL

## 2020-11-11 PROCEDURE — U0003 INFECTIOUS AGENT DETECTION BY NUCLEIC ACID (DNA OR RNA); SEVERE ACUTE RESPIRATORY SYNDROME CORONAVIRUS 2 (SARS-COV-2) (CORONAVIRUS DISEASE [COVID-19]), AMPLIFIED PROBE TECHNIQUE, MAKING USE OF HIGH THROUGHPUT TECHNOLOGIES AS DESCRIBED BY CMS-2020-01-R: HCPCS

## 2020-11-11 PROCEDURE — C9803 HOPD COVID-19 SPEC COLLECT: HCPCS

## 2020-11-12 LAB
SARS-COV-2 RNA RESP QL NAA+PROBE: NOTDETECTED
SPECIMEN SOURCE: NORMAL

## 2021-01-11 DIAGNOSIS — Z23 NEED FOR VACCINATION: ICD-10-CM

## 2021-02-16 RX ORDER — LEVOTHYROXINE SODIUM 0.1 MG/1
TABLET ORAL
Qty: 90 TABLET | Refills: 2 | Status: SHIPPED | OUTPATIENT
Start: 2021-02-16 | End: 2022-05-10

## 2021-02-17 ENCOUNTER — IMMUNIZATION (OUTPATIENT)
Dept: FAMILY PLANNING/WOMEN'S HEALTH CLINIC | Facility: IMMUNIZATION CENTER | Age: 81
End: 2021-02-17
Attending: INTERNAL MEDICINE
Payer: MEDICARE

## 2021-02-17 DIAGNOSIS — Z23 NEED FOR VACCINATION: ICD-10-CM

## 2021-02-17 DIAGNOSIS — Z23 ENCOUNTER FOR VACCINATION: Primary | ICD-10-CM

## 2021-02-17 PROCEDURE — 0001A PFIZER SARS-COV-2 VACCINE: CPT

## 2021-02-17 PROCEDURE — 91300 PFIZER SARS-COV-2 VACCINE: CPT

## 2021-03-10 ENCOUNTER — IMMUNIZATION (OUTPATIENT)
Dept: FAMILY PLANNING/WOMEN'S HEALTH CLINIC | Facility: IMMUNIZATION CENTER | Age: 81
End: 2021-03-10
Attending: INTERNAL MEDICINE
Payer: MEDICARE

## 2021-03-10 DIAGNOSIS — Z23 ENCOUNTER FOR VACCINATION: Primary | ICD-10-CM

## 2021-03-10 PROCEDURE — 91300 PFIZER SARS-COV-2 VACCINE: CPT

## 2021-03-10 PROCEDURE — 0002A PFIZER SARS-COV-2 VACCINE: CPT

## 2021-03-16 RX ORDER — TERAZOSIN 2 MG/1
CAPSULE ORAL
Qty: 90 CAPSULE | Refills: 2 | Status: SHIPPED | OUTPATIENT
Start: 2021-03-16 | End: 2021-12-13

## 2021-03-17 RX ORDER — ATORVASTATIN CALCIUM 20 MG/1
TABLET, FILM COATED ORAL
Qty: 90 TABLET | Refills: 2 | Status: SHIPPED | OUTPATIENT
Start: 2021-03-17

## 2021-03-25 ENCOUNTER — TELEPHONE (OUTPATIENT)
Dept: CARDIOLOGY | Facility: MEDICAL CENTER | Age: 81
End: 2021-03-25

## 2021-03-25 ENCOUNTER — OFFICE VISIT (OUTPATIENT)
Dept: CARDIOLOGY | Facility: MEDICAL CENTER | Age: 81
End: 2021-03-25
Payer: MEDICARE

## 2021-03-25 VITALS
HEART RATE: 91 BPM | OXYGEN SATURATION: 96 % | HEIGHT: 69 IN | DIASTOLIC BLOOD PRESSURE: 58 MMHG | SYSTOLIC BLOOD PRESSURE: 118 MMHG | WEIGHT: 222 LBS | BODY MASS INDEX: 32.88 KG/M2

## 2021-03-25 DIAGNOSIS — E78.5 HYPERLIPIDEMIA WITH TARGET LDL LESS THAN 100: ICD-10-CM

## 2021-03-25 DIAGNOSIS — G47.30 SLEEP APNEA, UNSPECIFIED TYPE: ICD-10-CM

## 2021-03-25 DIAGNOSIS — E66.9 OBESITY (BMI 35.0-39.9 WITHOUT COMORBIDITY): ICD-10-CM

## 2021-03-25 DIAGNOSIS — I10 ESSENTIAL HYPERTENSION: ICD-10-CM

## 2021-03-25 DIAGNOSIS — I49.9 IRREGULAR HEART RATE: ICD-10-CM

## 2021-03-25 PROCEDURE — 93000 ELECTROCARDIOGRAM COMPLETE: CPT | Performed by: INTERNAL MEDICINE

## 2021-03-25 PROCEDURE — 99204 OFFICE O/P NEW MOD 45 MIN: CPT | Performed by: INTERNAL MEDICINE

## 2021-03-25 RX ORDER — AMLODIPINE BESYLATE 5 MG/1
5 TABLET ORAL DAILY
COMMUNITY
End: 2021-05-25

## 2021-03-25 RX ORDER — ASPIRIN 325 MG
325 TABLET ORAL DAILY
COMMUNITY
End: 2021-06-02

## 2021-03-25 RX ORDER — MULTIVIT-MIN/IRON/FOLIC ACID/K 18-600-40
CAPSULE ORAL
COMMUNITY
End: 2021-06-02

## 2021-03-25 RX ORDER — FINASTERIDE 5 MG/1
5 TABLET, FILM COATED ORAL EVERY EVENING
COMMUNITY

## 2021-03-25 ASSESSMENT — ENCOUNTER SYMPTOMS
NEUROLOGICAL NEGATIVE: 1
CLAUDICATION: 0
EYES NEGATIVE: 1
WHEEZING: 0
STRIDOR: 0
FEVER: 0
PND: 0
CARDIOVASCULAR NEGATIVE: 1
WEAKNESS: 0
COUGH: 0
BRUISES/BLEEDS EASILY: 0
DIZZINESS: 0
GASTROINTESTINAL NEGATIVE: 1
HEMOPTYSIS: 0
LOSS OF CONSCIOUSNESS: 0
PALPITATIONS: 0
SPUTUM PRODUCTION: 0
RESPIRATORY NEGATIVE: 1
MUSCULOSKELETAL NEGATIVE: 1
CHILLS: 0
SORE THROAT: 0
SHORTNESS OF BREATH: 0
CONSTITUTIONAL NEGATIVE: 1
ORTHOPNEA: 0

## 2021-03-25 ASSESSMENT — FIBROSIS 4 INDEX: FIB4 SCORE: 1.77

## 2021-03-25 NOTE — TELEPHONE ENCOUNTER
RO    Patient called to advise they found the EKG. They were working with [facility] and wanted to let them know they do not need to send the request. They are sorry to cause an issue with this. They are going to send it through [employer]. Please follow up with the pt to make sure it was received. Pt can be reached at [phone].    Thank you,  Brigitte JIM

## 2021-03-25 NOTE — PROGRESS NOTES
Chief Complaint   Patient presents with   • HTN (Controlled)   • Irregular Heart Beat   • Hyperlipidemia       Subjective:   Edward Haas is a 80 y.o. male who presents today as a new consultation for an irregular heartbeat.  He is a 80-year-old male with no significant past medical history.  He was checking his blood pressure and was alerted about an abnormal heart rate.  He is asymptomatic during this time.  He was seen by his primary care physician who did an ECG which is not available it was reported as abnormal.    Past Medical History:   Diagnosis Date   • Hyperlipidemia    • Hypertension    • Thyroid disease     Hasimotos     Past Surgical History:   Procedure Laterality Date   • DENTAL SURGERY     • HERNIA REPAIR     • TONSILLECTOMY       Family History   Problem Relation Age of Onset   • Cancer Mother         breast cancer   • Thyroid Sister         Hasimotos   • Diabetes Sister         Type 2 DM   • Thyroid Brother         Hasimotos   • Diabetes Brother         Type 2 DM   • No Known Problems Maternal Grandmother    • Heart Attack Maternal Grandfather      Social History     Socioeconomic History   • Marital status:      Spouse name: Not on file   • Number of children: Not on file   • Years of education: Not on file   • Highest education level: Not on file   Occupational History   • Not on file   Tobacco Use   • Smoking status: Former Smoker     Packs/day: 1.00     Years: 19.00     Pack years: 19.00     Types: Cigarettes     Quit date: 3/22/1979     Years since quittin.0   • Smokeless tobacco: Never Used   Substance and Sexual Activity   • Alcohol use: Yes     Alcohol/week: 0.0 oz     Comment: 4 drinks a week.   • Drug use: No   • Sexual activity: Yes     Partners: Female   Other Topics Concern   • Not on file   Social History Narrative   • Not on file     Social Determinants of Health     Financial Resource Strain:    • Difficulty of Paying Living Expenses:    Food Insecurity:    •  Worried About Running Out of Food in the Last Year:    • Ran Out of Food in the Last Year:    Transportation Needs:    • Lack of Transportation (Medical):    • Lack of Transportation (Non-Medical):    Physical Activity:    • Days of Exercise per Week:    • Minutes of Exercise per Session:    Stress:    • Feeling of Stress :    Social Connections:    • Frequency of Communication with Friends and Family:    • Frequency of Social Gatherings with Friends and Family:    • Attends Confucianism Services:    • Active Member of Clubs or Organizations:    • Attends Club or Organization Meetings:    • Marital Status:    Intimate Partner Violence:    • Fear of Current or Ex-Partner:    • Emotionally Abused:    • Physically Abused:    • Sexually Abused:      Allergies   Allergen Reactions   • Penicillins Rash   • Latex      Outpatient Encounter Medications as of 3/25/2021   Medication Sig Dispense Refill   • finasteride (PROSCAR) 5 MG Tab Take 5 mg by mouth every day.     • amLODIPine (NORVASC) 5 MG Tab Take 5 mg by mouth every day.     • aspirin (ASA) 325 MG Tab Take 325 mg by mouth every day.     • Multiple Vitamins-Minerals (ICAPS AREDS 2 PO) Take  by mouth.     • Folic Acid (FOLATE PO) Take  by mouth. 1000 mcg     • Vitamin D, Cholecalciferol, 50 MCG (2000 UT) Cap Take  by mouth.     • Multiple Vitamin (MULTI-VITAMIN DAILY PO) Take  by mouth.     • atorvastatin (LIPITOR) 20 MG Tab TAKE 1 TABLET BY MOUTH EVERY DAY 90 tablet 2   • terazosin (HYTRIN) 2 MG Cap TAKE 1 CAPSULE BY MOUTH EVERY 24 HOURS 90 capsule 2   • levothyroxine (SYNTHROID) 100 MCG Tab TAKE 1 TABLET BY MOUTH EVERY 24 HOURS 90 tablet 2   • betamethasone dipropionate (DIPROLENE) 0.05 % Ointment AAA bid prn up to 14 days 1 Tube 0   • clobetasol (TEMOVATE) 0.05 % Cream AAA BID UP TO 14 DAYS THEN STOP 50 g 1   • [DISCONTINUED] lidocaine (LIDODERM) 5 % Patch Apply 1 Patch to skin as directed every 24 hours. (Patient not taking: Reported on 2/7/2020) 10 Patch 0   •  "[DISCONTINUED] tizanidine (ZANAFLEX) 4 MG Tab Take 1 Tab by mouth every 6 hours as needed. (Patient not taking: Reported on 2/7/2020) 30 Tab 3   • vitamin D, Ergocalciferol, (DRISDOL) 45614 UNITS CAPS capsule Take 1 Cap by mouth every 7 days. 8 Cap 0     No facility-administered encounter medications on file as of 3/25/2021.     Review of Systems   Constitutional: Negative.  Negative for chills, fever and malaise/fatigue.   HENT: Negative.  Negative for sore throat.    Eyes: Negative.    Respiratory: Negative.  Negative for cough, hemoptysis, sputum production, shortness of breath, wheezing and stridor.    Cardiovascular: Negative.  Negative for chest pain, palpitations, orthopnea, claudication, leg swelling and PND.   Gastrointestinal: Negative.    Genitourinary: Negative.    Musculoskeletal: Negative.    Skin: Negative.    Neurological: Negative.  Negative for dizziness, loss of consciousness and weakness.   Endo/Heme/Allergies: Negative.  Does not bruise/bleed easily.   All other systems reviewed and are negative.       Objective:   /58 (BP Location: Right arm, Patient Position: Sitting, BP Cuff Size: Adult)   Pulse 91   Ht 1.753 m (5' 9\")   Wt 101 kg (222 lb)   SpO2 96%   BMI 32.78 kg/m²     Physical Exam   Constitutional: He appears well-developed and well-nourished. No distress.   HENT:   Head: Normocephalic and atraumatic.   Right Ear: External ear normal.   Left Ear: External ear normal.   Nose: Nose normal.   Mouth/Throat: No oropharyngeal exudate.   Eyes: Pupils are equal, round, and reactive to light. Conjunctivae and EOM are normal. Right eye exhibits no discharge. Left eye exhibits no discharge. No scleral icterus.   Neck: No JVD present.   Cardiovascular: Normal rate, regular rhythm and intact distal pulses. Exam reveals no gallop and no friction rub.   No murmur heard.  Pulmonary/Chest: Effort normal. No stridor. No respiratory distress. He has no wheezes. He has no rales. He exhibits no " tenderness.   Abdominal: Soft. He exhibits no distension. There is no guarding.   Musculoskeletal:         General: No tenderness, deformity or edema. Normal range of motion.      Cervical back: Neck supple.   Neurological: He is alert. He has normal reflexes. He displays normal reflexes. No cranial nerve deficit. He exhibits normal muscle tone. Coordination normal.   Skin: Skin is warm and dry. No rash noted. He is not diaphoretic. No erythema. No pallor.   Psychiatric: He has a normal mood and affect. His behavior is normal. Judgment and thought content normal.   Nursing note and vitals reviewed.      Assessment:     1. Irregular heart rate  EKG    Cardiac Event Monitor   2. Essential hypertension     3. Hyperlipidemia with target LDL less than 100  Cardiac Event Monitor   4. Obesity (BMI 35.0-39.9 without comorbidity)  Cardiac Event Monitor   5. Sleep apnea, unspecified type         Medical Decision Making:  Today's Assessment / Status / Plan:     80-year-old male with a reported irregular heart rate.  We will get a copy of his ECG.  I will get a Zio patch.  Assuming it shows benign PVCs then we will not recommend any intervention given the fact that it is asymptomatic.  Given does show atrial fibrillation we will schedule him for follow-up.  We will see him back on an as-needed basis.

## 2021-03-25 NOTE — TELEPHONE ENCOUNTER
Records request sent as well as Authorization for release of information. Confirmation fax, cardiac records and EKG received. Scanned into media under Dr. Trina Awan.

## 2021-03-25 NOTE — TELEPHONE ENCOUNTER
Patient enrolled in 14 day Zio Patch program per Dr. Cardenas  In clinic [facility]. @ [facility]  >Currently pending EOS.  INS: Medicare Part A & B

## 2021-03-26 LAB — EKG IMPRESSION: NORMAL

## 2021-04-19 DIAGNOSIS — E66.9 OBESITY (BMI 35.0-39.9 WITHOUT COMORBIDITY): ICD-10-CM

## 2021-04-19 DIAGNOSIS — E78.5 HYPERLIPIDEMIA WITH TARGET LDL LESS THAN 100: ICD-10-CM

## 2021-04-19 DIAGNOSIS — I49.9 IRREGULAR HEART RATE: ICD-10-CM

## 2021-05-07 ENCOUNTER — TELEPHONE (OUTPATIENT)
Dept: CARDIOLOGY | Facility: MEDICAL CENTER | Age: 81
End: 2021-05-07

## 2021-05-07 NOTE — TELEPHONE ENCOUNTER
Waldo Cardenas M.D.  [place], [place].   Please let patient know that his monitor showed new onset atrial fibrillation.  He needs to be started on oral anticoagulant.  We can discuss this in clinic we can do that over the phone.     [employer] is his Medicare Part D supplement, pt is scanning INS card and current med list for comparison. Will contact insurance to determine most affordable OAC other than warfarin.

## 2021-05-07 NOTE — TELEPHONE ENCOUNTER
----- Message from Waldo Cardenas M.D. sent at 5/7/2021  7:38 AM PDT -----  Please let patient know that his monitor showed new onset atrial fibrillation.  He needs to be started on oral anticoagulant.  We can discuss this in clinic we can do that over the phone.

## 2021-05-20 ENCOUNTER — TELEPHONE (OUTPATIENT)
Dept: CARDIOLOGY | Facility: MEDICAL CENTER | Age: 81
End: 2021-05-20

## 2021-05-20 NOTE — TELEPHONE ENCOUNTER
Per Dr. Awan, increased Metoprolol and put on Prozac and will be following up with patient in a couple of weeks.    She ordered an Echo which will be done in July.    Scheduled patient for follow up with RO 5/25/21 at 340pm

## 2021-05-25 ENCOUNTER — OFFICE VISIT (OUTPATIENT)
Dept: CARDIOLOGY | Facility: MEDICAL CENTER | Age: 81
End: 2021-05-25
Payer: MEDICARE

## 2021-05-25 VITALS
DIASTOLIC BLOOD PRESSURE: 72 MMHG | RESPIRATION RATE: 16 BRPM | HEIGHT: 69 IN | HEART RATE: 56 BPM | OXYGEN SATURATION: 95 % | SYSTOLIC BLOOD PRESSURE: 124 MMHG | WEIGHT: 222.6 LBS | BODY MASS INDEX: 32.97 KG/M2

## 2021-05-25 DIAGNOSIS — E78.5 HYPERLIPIDEMIA WITH TARGET LDL LESS THAN 100: ICD-10-CM

## 2021-05-25 DIAGNOSIS — I48.0 PAROXYSMAL ATRIAL FIBRILLATION (HCC): ICD-10-CM

## 2021-05-25 DIAGNOSIS — I10 ESSENTIAL HYPERTENSION: ICD-10-CM

## 2021-05-25 DIAGNOSIS — Z79.899 HIGH RISK MEDICATION USE: ICD-10-CM

## 2021-05-25 PROCEDURE — 99215 OFFICE O/P EST HI 40 MIN: CPT | Performed by: INTERNAL MEDICINE

## 2021-05-25 RX ORDER — METOPROLOL SUCCINATE 50 MG/1
50 TABLET, EXTENDED RELEASE ORAL
COMMUNITY
Start: 2021-05-18 | End: 2021-05-25 | Stop reason: SDUPTHER

## 2021-05-25 RX ORDER — FLUOXETINE HYDROCHLORIDE 20 MG/1
20 CAPSULE ORAL EVERY MORNING
COMMUNITY
Start: 2021-03-04

## 2021-05-25 RX ORDER — METOPROLOL SUCCINATE 50 MG/1
100 TABLET, EXTENDED RELEASE ORAL
Qty: 60 TABLET | Refills: 11 | Status: SHIPPED | OUTPATIENT
Start: 2021-05-25 | End: 2021-06-02

## 2021-05-25 RX ORDER — LOSARTAN POTASSIUM 50 MG/1
50 TABLET ORAL 2 TIMES DAILY
COMMUNITY
Start: 2021-04-30

## 2021-05-25 ASSESSMENT — ENCOUNTER SYMPTOMS
CARDIOVASCULAR NEGATIVE: 1
PND: 0
SHORTNESS OF BREATH: 0
WHEEZING: 0
CONSTITUTIONAL NEGATIVE: 1
FEVER: 0
COUGH: 0
GASTROINTESTINAL NEGATIVE: 1
LOSS OF CONSCIOUSNESS: 0
EYES NEGATIVE: 1
BRUISES/BLEEDS EASILY: 0
NEUROLOGICAL NEGATIVE: 1
PALPITATIONS: 0
HEMOPTYSIS: 0
CHILLS: 0
SORE THROAT: 0
ORTHOPNEA: 0
DIZZINESS: 0
RESPIRATORY NEGATIVE: 1
STRIDOR: 0
MUSCULOSKELETAL NEGATIVE: 1
WEAKNESS: 0
SPUTUM PRODUCTION: 0
CLAUDICATION: 0

## 2021-05-25 ASSESSMENT — FIBROSIS 4 INDEX: FIB4 SCORE: 1.79

## 2021-05-25 NOTE — PROGRESS NOTES
Chief Complaint   Patient presents with   • Irregular Heart Beat       Subjective:   Edward Haas is a 81 y.o. male who presents today as a follow-up for his paroxysmal atrial fibrillation.  He is on a recent [employer] monitor.  Currently has been.  He has no chest pain but does feel fatigued episodes of atrial fibrillation.  Of note his rate was also not controlled with average heart rates of about 120 during his runs of A. fib.    Past Medical History:   Diagnosis Date   • Hyperlipidemia    • Hypertension    • Thyroid disease     Hasimotos     Past Surgical History:   Procedure Laterality Date   • DENTAL SURGERY     • HERNIA REPAIR     • TONSILLECTOMY       Family History   Problem Relation Age of Onset   • Cancer Mother         breast cancer   • Thyroid Sister         Hasimotos   • Diabetes Sister         Type 2 DM   • Thyroid Brother         Hasimotos   • Diabetes Brother         Type 2 DM   • No Known Problems Maternal Grandmother    • Heart Attack Maternal Grandfather      Social History     Socioeconomic History   • Marital status:      Spouse name: Not on file   • Number of children: Not on file   • Years of education: Not on file   • Highest education level: Not on file   Occupational History   • Not on file   Tobacco Use   • Smoking status: Former Smoker     Packs/day: 1.00     Years: 19.00     Pack years: 19.00     Types: Cigarettes     Quit date: 3/22/1979     Years since quittin.2   • Smokeless tobacco: Never Used   Vaping Use   • Vaping Use: Never used   Substance and Sexual Activity   • Alcohol use: Yes     Alcohol/week: 0.0 oz     Comment: 4 drinks a week.   • Drug use: No   • Sexual activity: Yes     Partners: Female   Other Topics Concern   • Not on file   Social History Narrative   • Not on file     Social Determinants of Health     Financial Resource Strain:    • Difficulty of Paying Living Expenses:    Food Insecurity:    • Worried About Running Out of Food in the Last Year:     • Ran Out of Food in the Last Year:    Transportation Needs:    • Lack of Transportation (Medical):    • Lack of Transportation (Non-Medical):    Physical Activity:    • Days of Exercise per Week:    • Minutes of Exercise per Session:    Stress:    • Feeling of Stress :    Social Connections:    • Frequency of Communication with Friends and Family:    • Frequency of Social Gatherings with Friends and Family:    • Attends Congregational Services:    • Active Member of Clubs or Organizations:    • Attends Club or Organization Meetings:    • Marital Status:    Intimate Partner Violence:    • Fear of Current or Ex-Partner:    • Emotionally Abused:    • Physically Abused:    • Sexually Abused:      Allergies   Allergen Reactions   • Penicillins Rash   • Latex    • Penicillin G Rash     Outpatient Encounter Medications as of 5/25/2021   Medication Sig Dispense Refill   • FLUoxetine (PROZAC) 20 MG Cap Take  by mouth every morning. Take 1 capsule by mouth every morning     • losartan (COZAAR) 50 MG Tab TAKE 1 TABLET BY MOUTH EVERY MORNING AND 2 TABLETS AT BEDTIME     • apixaban (ELIQUIS) 5mg Tab Take 1 tablet by mouth 2 times a day. 60 tablet 11   • metoprolol SR (TOPROL XL) 50 MG TABLET SR 24 HR Take 2 Tablets by mouth every day. 60 tablet 11   • finasteride (PROSCAR) 5 MG Tab Take 5 mg by mouth every day.     • aspirin (ASA) 325 MG Tab Take 325 mg by mouth every day.     • Multiple Vitamins-Minerals (ICAPS AREDS 2 PO) Take  by mouth.     • Folic Acid (FOLATE PO) Take  by mouth. 1000 mcg     • Vitamin D, Cholecalciferol, 50 MCG (2000 UT) Cap Take  by mouth.     • Multiple Vitamin (MULTI-VITAMIN DAILY PO) Take  by mouth.     • atorvastatin (LIPITOR) 20 MG Tab TAKE 1 TABLET BY MOUTH EVERY DAY 90 tablet 2   • terazosin (HYTRIN) 2 MG Cap TAKE 1 CAPSULE BY MOUTH EVERY 24 HOURS 90 capsule 2   • levothyroxine (SYNTHROID) 100 MCG Tab TAKE 1 TABLET BY MOUTH EVERY 24 HOURS 90 tablet 2   • betamethasone dipropionate (DIPROLENE) 0.05 %  "Ointment AAA bid prn up to 14 days 1 Tube 0   • vitamin D, Ergocalciferol, (DRISDOL) 08128 UNITS CAPS capsule Take 1 Cap by mouth every 7 days. 8 Cap 0   • [DISCONTINUED] metoprolol SR (TOPROL XL) 50 MG TABLET SR 24 HR Take 50 mg by mouth every day.     • [DISCONTINUED] amLODIPine (NORVASC) 5 MG Tab Take 5 mg by mouth every day. (Patient not taking: Reported on 5/25/2021)     • [DISCONTINUED] clobetasol (TEMOVATE) 0.05 % Cream AAA BID UP TO 14 DAYS THEN STOP (Patient not taking: Reported on 5/25/2021) 50 g 1     No facility-administered encounter medications on file as of 5/25/2021.     Review of Systems   Constitutional: Negative.  Negative for chills, fever and malaise/fatigue.   HENT: Negative.  Negative for sore throat.    Eyes: Negative.    Respiratory: Negative.  Negative for cough, hemoptysis, sputum production, shortness of breath, wheezing and stridor.    Cardiovascular: Negative.  Negative for chest pain, palpitations, orthopnea, claudication, leg swelling and PND.   Gastrointestinal: Negative.    Genitourinary: Negative.    Musculoskeletal: Negative.    Skin: Negative.    Neurological: Negative.  Negative for dizziness, loss of consciousness and weakness.   Endo/Heme/Allergies: Negative.  Does not bruise/bleed easily.   All other systems reviewed and are negative.       Objective:   /72 (BP Location: Right arm, Patient Position: Sitting, BP Cuff Size: Adult)   Pulse (!) 56   Resp 16   Ht 1.753 m (5' 9\")   Wt 101 kg (222 lb 9.6 oz)   SpO2 95%   BMI 32.87 kg/m²     Physical Exam   Constitutional: He appears well-developed. No distress.   HENT:   Head: Normocephalic and atraumatic.   Right Ear: External ear normal.   Left Ear: External ear normal.   Nose: Nose normal.   Mouth/Throat: No oropharyngeal exudate.   Eyes: Pupils are equal, round, and reactive to light. Conjunctivae are normal. Right eye exhibits no discharge. Left eye exhibits no discharge. No scleral icterus.   Neck: No JVD present. "   Cardiovascular: Normal rate and regular rhythm. Exam reveals no gallop and no friction rub.   No murmur heard.  Pulmonary/Chest: Effort normal. No stridor. No respiratory distress. He has no wheezes. He has no rales. He exhibits no tenderness.   Abdominal: Soft. He exhibits no distension. There is no guarding.   Musculoskeletal:         General: No tenderness or deformity. Normal range of motion.      Cervical back: Neck supple.   Neurological: He is alert. He has normal reflexes. He displays normal reflexes. No cranial nerve deficit. He exhibits normal muscle tone. Coordination normal.   Skin: Skin is warm and dry. No rash noted. He is not diaphoretic. No erythema. No pallor.   Psychiatric: His behavior is normal. Judgment and thought content normal.   Nursing note and vitals reviewed.      Assessment:     1. Essential hypertension  metoprolol SR (TOPROL XL) 50 MG TABLET SR 24 HR   2. Hyperlipidemia with target LDL less than 100  metoprolol SR (TOPROL XL) 50 MG TABLET SR 24 HR    CL-LEFT ATRIAL APPENDAGE CLOSURE   3. Paroxysmal atrial fibrillation (HCC)  apixaban (ELIQUIS) 5mg Tab    metoprolol SR (TOPROL XL) 50 MG TABLET SR 24 HR    CL-LEFT ATRIAL APPENDAGE CLOSURE   4. High risk medication use  apixaban (ELIQUIS) 5mg Tab    metoprolol SR (TOPROL XL) 50 MG TABLET SR 24 HR    CL-LEFT ATRIAL APPENDAGE CLOSURE       Medical Decision Making:  Today's Assessment / Status / Plan:     81-year-old male with paroxysmal atrial fibrillation.  We discussed the risk and benefits of oral anticoagulation.  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  He is worried about the risk of stroke.  We discussed the risk benefits alternatives of proceeding with a watchman closure device which he would like to pursue.  In the meantime I will start him on Eliquis and increase the metoprolol to 100.  We will await consultation with EP for his watchman.  We will see him back in 3 months.

## 2021-05-26 ENCOUNTER — TELEPHONE (OUTPATIENT)
Dept: CARDIOLOGY | Facility: MEDICAL CENTER | Age: 81
End: 2021-05-26

## 2021-05-26 NOTE — TELEPHONE ENCOUNTER
----- Message from Elio Good sent at 5/26/2021  8:09 AM PDT -----  Regarding: Watchman per Dr. Cardenas    ----- Message -----  From: Waldo Cardenas M.D.  Sent: 5/25/2021   4:18 PM PDT  To: Elio Good    I put the patient in for a Watchman.  Can we get this expedited.  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

## 2021-06-02 ENCOUNTER — OFFICE VISIT (OUTPATIENT)
Dept: CARDIOLOGY | Facility: MEDICAL CENTER | Age: 81
End: 2021-06-02
Payer: MEDICARE

## 2021-06-02 VITALS
DIASTOLIC BLOOD PRESSURE: 68 MMHG | SYSTOLIC BLOOD PRESSURE: 154 MMHG | BODY MASS INDEX: 32.73 KG/M2 | HEART RATE: 56 BPM | HEIGHT: 69 IN | WEIGHT: 221 LBS | OXYGEN SATURATION: 91 %

## 2021-06-02 DIAGNOSIS — Z79.01 CHRONIC ANTICOAGULATION: ICD-10-CM

## 2021-06-02 DIAGNOSIS — I48.0 PAROXYSMAL ATRIAL FIBRILLATION (HCC): ICD-10-CM

## 2021-06-02 DIAGNOSIS — I10 ESSENTIAL HYPERTENSION: ICD-10-CM

## 2021-06-02 PROCEDURE — 93000 ELECTROCARDIOGRAM COMPLETE: CPT | Performed by: INTERNAL MEDICINE

## 2021-06-02 PROCEDURE — 99204 OFFICE O/P NEW MOD 45 MIN: CPT | Performed by: INTERNAL MEDICINE

## 2021-06-02 RX ORDER — DRONEDARONE 400 MG/1
400 TABLET, FILM COATED ORAL 2 TIMES DAILY WITH MEALS
Qty: 60 TABLET | Refills: 3 | Status: SHIPPED | OUTPATIENT
Start: 2021-06-02 | End: 2021-06-17

## 2021-06-02 RX ORDER — METOPROLOL SUCCINATE 25 MG/1
25 TABLET, EXTENDED RELEASE ORAL DAILY
Qty: 30 TABLET | Refills: 5 | Status: SHIPPED | OUTPATIENT
Start: 2021-06-02 | End: 2021-06-03

## 2021-06-02 ASSESSMENT — FIBROSIS 4 INDEX: FIB4 SCORE: 1.79

## 2021-06-02 NOTE — PROGRESS NOTES
Arrhythmia Clinic Note (New Patient)    DOS: 2021    Referring physician: Waldo Cardenas    Chief complaint/Reason for consult: PAF    HPI:  Patient is an 82 yo M. History of PAF recently discovered incidentally by PCP. Zio was done showing 3% burden. He has since seen cardiology, started on BB and OAC and has had his BB increased for borderline ventricular rates. Initially he thought he was asymptomatic but now knowing he has the episodes he does admit he can feel his heart go out of rhythm with palpitations. 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 He was somewhat hesitant with the DOACs. Referred to EP for management of AF and discussion of alteratives to OAC.    ROS (+in BOLD):  Constitutional: Fevers/chills/fatigue/weightloss  HEENT: Blurry vision/eye pain/sore throat/hearing loss  Respiratory: Shortness of breath/cough  Cardiovascular: Chest pain/palpitations/edema/orthopnea/syncope  GI: Nausea/vomitting/diarrhea  MSK: Arthralgias/myagias/muscle weakness  Skin: Rash/sores  Neurological: Numbness/tremors/vertigo  Endocrine: Excessive thirst/polyuria/cold intolerance/heat intolerance  Psych: Depression/anxiety    Past Medical History:   Diagnosis Date   • Hyperlipidemia    • Hypertension    • Thyroid disease     Hasimotos       Past Surgical History:   Procedure Laterality Date   • DENTAL SURGERY     • HERNIA REPAIR     • TONSILLECTOMY         Social History     Socioeconomic History   • Marital status:      Spouse name: Not on file   • Number of children: Not on file   • Years of education: Not on file   • Highest education level: Not on file   Occupational History   • Not on file   Tobacco Use   • Smoking status: Former Smoker     Packs/day: 1.00     Years: 19.00     Pack years: 19.00     Types: Cigarettes     Quit date: 3/22/1979     Years since quittin.2   • Smokeless tobacco: Never Used   Vaping Use   • Vaping Use: Never used   Substance and Sexual Activity   • Alcohol use:  Yes     Alcohol/week: 0.0 oz     Comment: 4 drinks a week.   • Drug use: No   • Sexual activity: Yes     Partners: Female   Other Topics Concern   • Not on file   Social History Narrative   • Not on file     Social Determinants of Health     Financial Resource Strain:    • Difficulty of Paying Living Expenses:    Food Insecurity:    • Worried About Running Out of Food in the Last Year:    • Ran Out of Food in the Last Year:    Transportation Needs:    • Lack of Transportation (Medical):    • Lack of Transportation (Non-Medical):    Physical Activity:    • Days of Exercise per Week:    • Minutes of Exercise per Session:    Stress:    • Feeling of Stress :    Social Connections:    • Frequency of Communication with Friends and Family:    • Frequency of Social Gatherings with Friends and Family:    • Attends [employer] Services:    • Active Member of Clubs or Organizations:    • Attends Club or Organization Meetings:    • Marital Status:    Intimate Partner Violence:    • Fear of Current or Ex-Partner:    • Emotionally Abused:    • Physically Abused:    • Sexually Abused:        Family History   Problem Relation Age of Onset   • Cancer Mother         breast cancer   • Thyroid Sister         Hasimotos   • Diabetes Sister         Type 2 DM   • Thyroid Brother         Hasimotos   • Diabetes Brother         Type 2 DM   • No Known Problems Maternal Grandmother    • Heart Attack Maternal Grandfather        Allergies   Allergen Reactions   • Penicillins Rash   • Latex    • Oxycodone-Acetaminophen Unspecified     Hallucinations per pt.    • Penicillin G Rash       Current Outpatient Medications   Medication Sig Dispense Refill   • dronedarone (MULTAQ) 400 MG Tab Take 1 tablet by mouth 2 times a day with meals. 60 tablet 3   • metoprolol SR (TOPROL XL) 25 MG TABLET SR 24 HR Take 1 tablet by mouth every day. 30 tablet 5   • FLUoxetine (PROZAC) 20 MG Cap Take  by mouth every morning. Take 1 capsule by mouth every morning     •  "losartan (COZAAR) 50 MG Tab TAKE 1 TABLET BY MOUTH EVERY MORNING AND 2 TABLETS AT BEDTIME     • apixaban (ELIQUIS) 5mg Tab Take 1 tablet by mouth 2 times a day. 60 tablet 11   • finasteride (PROSCAR) 5 MG Tab Take 5 mg by mouth every day.     • Multiple Vitamins-Minerals (ICAPS AREDS 2 PO) Take  by mouth.     • Folic Acid (FOLATE PO) Take  by mouth. 1000 mcg     • Multiple Vitamin (MULTI-VITAMIN DAILY PO) Take  by mouth.     • atorvastatin (LIPITOR) 20 MG Tab TAKE 1 TABLET BY MOUTH EVERY DAY 90 tablet 2   • terazosin (HYTRIN) 2 MG Cap TAKE 1 CAPSULE BY MOUTH EVERY 24 HOURS 90 capsule 2   • levothyroxine (SYNTHROID) 100 MCG Tab TAKE 1 TABLET BY MOUTH EVERY 24 HOURS 90 tablet 2   • betamethasone dipropionate (DIPROLENE) 0.05 % Ointment AAA bid prn up to 14 days 1 Tube 0   • vitamin D, Ergocalciferol, (DRISDOL) 00564 UNITS CAPS capsule Take 1 Cap by mouth every 7 days. 8 Cap 0   • aspirin (ASA) 325 MG Tab Take 325 mg by mouth every day. (Patient not taking: Reported on 6/2/2021)     • Vitamin D, Cholecalciferol, 50 MCG (2000 UT) Cap Take  by mouth. (Patient not taking: Reported on 6/2/2021)       No current facility-administered medications for this visit.       Physical Exam:  Vitals:    06/02/21 0841   BP: 154/68   BP Location: Left arm   Patient Position: Sitting   BP Cuff Size: Adult   Pulse: (!) 56   SpO2: 91%   Weight: 100 kg (221 lb)   Height: 1.753 m (5' 9\")     General appearance: NAD, conversant  Eyes: anicteric sclerae, no lid-lag; PERRLA  HENT: Atraumatic; moist mucous membranes, no ulcerations  Neck: Trachea midline; FROM, supple, no thyromegaly  Lungs: CTA, with normal respiratory effort and no intercostal retractions  CV: RRR, no MRGs, no JVD  Abdomen: Soft, non-tender; normal bowel sounds, no HSM  Extremities: No peripheral edema. No clubbing or cyanosis.  Skin: Normal temperature, turgor and texture; no rash or ulcers  Psych: Appropriate affect, alert and oriented to person, place and " time      Data:  Labs reviewed    Prior echo/stress reviewed:  MPI showing LVEF of 60% no jeopardized myopcardium    EKG interpreted by me:  Sinus marvin    Zio showing AF/AFL    Impression/Plan:  1. Paroxysmal atrial fibrillation (HCC)  EKG   2. Essential hypertension     3. 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

## 2021-06-09 ENCOUNTER — TELEPHONE (OUTPATIENT)
Dept: CARDIOLOGY | Facility: MEDICAL CENTER | Age: 81
End: 2021-06-09

## 2021-06-09 NOTE — TELEPHONE ENCOUNTER
----- Message from Orquidea Luna M.D. sent at 6/9/2021  1:25 PM PDT -----  Regarding: RE: Pharm notification  Yes okay to take  ----- Message -----  From: Monica Awan R.N.  Sent: 6/4/2021  11:39 AM PDT  To: Orquidea Luna M.D.  Subject: Pharm notification                               To SS got a notification from Psychiatric that pt on multaq and fluoxetine   Inc risk for prolonged QT  Ok to continue taking

## 2021-06-17 RX ORDER — DRONEDARONE 400 MG/1
400 TABLET, FILM COATED ORAL 2 TIMES DAILY WITH MEALS
Qty: 180 TABLET | Refills: 1 | Status: SHIPPED | OUTPATIENT
Start: 2021-06-17 | End: 2021-07-30 | Stop reason: SDUPTHER

## 2021-06-19 LAB — EKG IMPRESSION: NORMAL

## 2021-07-07 ENCOUNTER — OFFICE VISIT (OUTPATIENT)
Dept: CARDIOLOGY | Facility: MEDICAL CENTER | Age: 81
End: 2021-07-07
Payer: MEDICARE

## 2021-07-07 ENCOUNTER — HOSPITAL ENCOUNTER (OUTPATIENT)
Dept: CARDIOLOGY | Facility: MEDICAL CENTER | Age: 81
End: 2021-07-07
Attending: INTERNAL MEDICINE
Payer: MEDICARE

## 2021-07-07 VITALS
DIASTOLIC BLOOD PRESSURE: 78 MMHG | HEART RATE: 55 BPM | RESPIRATION RATE: 18 BRPM | WEIGHT: 211 LBS | BODY MASS INDEX: 31.16 KG/M2 | SYSTOLIC BLOOD PRESSURE: 120 MMHG | OXYGEN SATURATION: 94 %

## 2021-07-07 DIAGNOSIS — I48.0 PAROXYSMAL ATRIAL FIBRILLATION (HCC): ICD-10-CM

## 2021-07-07 DIAGNOSIS — I49.5 SICK SINUS SYNDROME (HCC): ICD-10-CM

## 2021-07-07 LAB
LV EJECT FRACT  99904: 60
LV EJECT FRACT MOD 2C 99903: 55.13
LV EJECT FRACT MOD 4C 99902: 70.32
LV EJECT FRACT MOD BP 99901: 61.8

## 2021-07-07 PROCEDURE — 99214 OFFICE O/P EST MOD 30 MIN: CPT | Mod: 25 | Performed by: INTERNAL MEDICINE

## 2021-07-07 PROCEDURE — 93306 TTE W/DOPPLER COMPLETE: CPT

## 2021-07-07 PROCEDURE — 93000 ELECTROCARDIOGRAM COMPLETE: CPT | Performed by: INTERNAL MEDICINE

## 2021-07-07 PROCEDURE — 93306 TTE W/DOPPLER COMPLETE: CPT | Mod: 26 | Performed by: INTERNAL MEDICINE

## 2021-07-07 RX ORDER — AMLODIPINE BESYLATE 5 MG/1
5 TABLET ORAL
Status: ON HOLD | COMMUNITY
Start: 2021-05-29 | End: 2021-08-13

## 2021-07-07 ASSESSMENT — FIBROSIS 4 INDEX: FIB4 SCORE: 1.79

## 2021-07-07 NOTE — PROGRESS NOTES
Arrhythmia Clinic Note (Established patient)    DOS: 2021    Chief complaint/Reason for consult: F/u PAF    Interval History:  Pt is an 82 yo M. History of symptomatic PAF. Recently diagnosed. Referred initially for discussion regarding WALT closure. He is currently taking and willing to continue Eliquis. I started him on a trial of Multaq along with his low dose Toprol as a combination of rate/rhythm control. He thinks he has been in sinus but having some GI side effects with the Multaq. Underwent echo this morning. He wants to discuss potential AF ablation more as a rhythm control strategy to potentially get off BB/class III antiarrhythmic.    ROS (+ highlighted in red):  General--Negative for fatigue, weight loss or weight gain  Cardiovascular--Negative for CP, orthopnea, PND    Past Medical History:   Diagnosis Date   • Hyperlipidemia    • Hypertension    • Thyroid disease     Hasimotos       Past Surgical History:   Procedure Laterality Date   • DENTAL SURGERY     • HERNIA REPAIR     • TONSILLECTOMY         Social History     Socioeconomic History   • Marital status:      Spouse name: Not on file   • Number of children: Not on file   • Years of education: Not on file   • Highest education level: Not on file   Occupational History   • Not on file   Tobacco Use   • Smoking status: Former Smoker     Packs/day: 1.00     Years: 19.00     Pack years: 19.00     Types: Cigarettes     Quit date: 3/22/1979     Years since quittin.3   • Smokeless tobacco: Never Used   Vaping Use   • Vaping Use: Never used   Substance and Sexual Activity   • Alcohol use: Yes     Alcohol/week: 0.0 oz     Comment: 4 drinks a week.   • Drug use: No   • Sexual activity: Yes     Partners: Female   Other Topics Concern   • Not on file   Social History Narrative   • Not on file     Social Determinants of Health     Financial Resource Strain:    • Difficulty of Paying Living Expenses:    Food Insecurity:    • Worried About Running  Out of Food in the Last Year:    • Ran Out of Food in the Last Year:    Transportation Needs:    • Lack of Transportation (Medical):    • Lack of Transportation (Non-Medical):    Physical Activity:    • Days of Exercise per Week:    • Minutes of Exercise per Session:    Stress:    • Feeling of Stress :    Social Connections:    • Frequency of Communication with Friends and Family:    • Frequency of Social Gatherings with Friends and Family:    • Attends [employer] Services:    • Active Member of Clubs or Organizations:    • Attends Club or Organization Meetings:    • Marital Status:    Intimate Partner Violence:    • Fear of Current or Ex-Partner:    • Emotionally Abused:    • Physically Abused:    • Sexually Abused:        Family History   Problem Relation Age of Onset   • Cancer Mother         breast cancer   • Thyroid Sister         Hasimotos   • Diabetes Sister         Type 2 DM   • Thyroid Brother         Hasimotos   • Diabetes Brother         Type 2 DM   • No Known Problems Maternal Grandmother    • Heart Attack Maternal Grandfather        Allergies   Allergen Reactions   • Penicillins Rash   • Latex    • Oxycodone-Acetaminophen Unspecified     Hallucinations per pt.    • Penicillin G Rash       Current Outpatient Medications   Medication Sig Dispense Refill   • amLODIPine (NORVASC) 5 MG Tab Take 5 mg by mouth every day.     • dronedarone (MULTAQ) 400 MG Tab Take 1 tablet by mouth 2 times a day with meals. 180 tablet 1   • metoprolol SR (TOPROL XL) 25 MG TABLET SR 24 HR TAKE 1 TABLET BY MOUTH EVERY DAY 90 tablet 3   • FLUoxetine (PROZAC) 20 MG Cap Take  by mouth every morning. Take 1 capsule by mouth every morning     • losartan (COZAAR) 50 MG Tab TAKE 1 TABLET BY MOUTH EVERY MORNING AND 2 TABLETS AT BEDTIME     • apixaban (ELIQUIS) 5mg Tab Take 1 tablet by mouth 2 times a day. 60 tablet 11   • finasteride (PROSCAR) 5 MG Tab Take 5 mg by mouth every day.     • Multiple Vitamins-Minerals (ICAPS AREDS 2 PO) Take   by mouth.     • Folic Acid (FOLATE PO) Take  by mouth. 1000 mcg     • Multiple Vitamin (MULTI-VITAMIN DAILY PO) Take  by mouth.     • atorvastatin (LIPITOR) 20 MG Tab TAKE 1 TABLET BY MOUTH EVERY DAY 90 tablet 2   • terazosin (HYTRIN) 2 MG Cap TAKE 1 CAPSULE BY MOUTH EVERY 24 HOURS 90 capsule 2   • levothyroxine (SYNTHROID) 100 MCG Tab TAKE 1 TABLET BY MOUTH EVERY 24 HOURS 90 tablet 2   • betamethasone dipropionate (DIPROLENE) 0.05 % Ointment AAA bid prn up to 14 days 1 Tube 0   • vitamin D, Ergocalciferol, (DRISDOL) 81977 UNITS CAPS capsule Take 1 Cap by mouth every 7 days. 8 Cap 0     No current facility-administered medications for this visit.       Physical Exam:  Vitals:    07/07/21 1621   BP: 120/78   BP Location: Left arm   Patient Position: Sitting   BP Cuff Size: Adult   Pulse: (!) 55   Resp: 18   SpO2: 94%   Weight: 95.7 kg (211 lb)     General appearance: NAD, conversant  HEENT: PERRL, neck is supple with FROM  Lungs: Clear to auscultation, normal respiratory effort  CV: RRR, no murmurs/rubs/gallops, no JVD  Abdomen: Soft, non-tender with normal bowel sounds  Extremities: No peripheral edema, no clubbing or cyanosis  Skin: No rash, lesions, or ulcers  Psych: Alert and oriented to person, place and time    Data:  Labs reviewed    Prior echo/stress reviewed:  RV size generous, preserved LV function, mild AI    EKG interpreted by me:  NSR    Impression/Plan:  1. Paroxysmal atrial fibrillation (HCC)  EKG   2. Sick sinus syndrome (HCC)       -I discussed the AF ablation procedure in detail including risks/benefits/alternatives  -I think he is on the older side, but otherwise healthy and procedure not prohibitive  -I think he has some degree of SND and may need a PPM anyways, but reasonable to ablate and if successful, see if cessation of BB and antiarrhythmic can help his rate  -All questions were answered and they would like to proceed     Orquidea Luna MD

## 2021-07-08 ENCOUNTER — TELEPHONE (OUTPATIENT)
Dept: CARDIOLOGY | Facility: MEDICAL CENTER | Age: 81
End: 2021-07-08

## 2021-07-08 NOTE — TELEPHONE ENCOUNTER
----- Message from Orquidea Luna M.D. sent at 7/7/2021  5:03 PM PDT -----  Let's schedule next available AF ablation slot. No meds to hold.  Orquidea

## 2021-07-08 NOTE — TELEPHONE ENCOUNTER
Patient scheduled for afib ablation on 9-1-21 with Dr. Luna. Patient has been instructed to check in at 6:00 for 8:00 case time. Message sent to authorizations. Emailed Carto.

## 2021-07-24 LAB — EKG IMPRESSION: NORMAL

## 2021-07-30 DIAGNOSIS — I48.0 PAROXYSMAL ATRIAL FIBRILLATION (HCC): ICD-10-CM

## 2021-08-03 RX ORDER — DRONEDARONE 400 MG/1
400 TABLET, FILM COATED ORAL 2 TIMES DAILY WITH MEALS
Qty: 180 TABLET | Refills: 3 | Status: SHIPPED | OUTPATIENT
Start: 2021-08-03 | End: 2021-09-08

## 2021-08-12 ENCOUNTER — APPOINTMENT (OUTPATIENT)
Dept: ADMISSIONS | Facility: MEDICAL CENTER | Age: 81
End: 2021-08-12
Attending: INTERNAL MEDICINE
Payer: MEDICARE

## 2021-08-12 NOTE — TELEPHONE ENCOUNTER
Recvd call from patient - he is now scheduled for afib ablation on 8-13-21 with Dr. Luna. Patient has been instructed to check in at 6:00 for 8:00 case time. Message sent to authorizations. Emailed Carto.

## 2021-08-12 NOTE — TELEPHONE ENCOUNTER
Called and spoke with patient - due to a cancellation, I offered to move this patient's procedure to tomorrow, 8-13-21. He will call me back shortly with his answer.

## 2021-08-13 ENCOUNTER — APPOINTMENT (OUTPATIENT)
Dept: CARDIOLOGY | Facility: MEDICAL CENTER | Age: 81
End: 2021-08-13
Attending: INTERNAL MEDICINE
Payer: MEDICARE

## 2021-08-13 ENCOUNTER — ANESTHESIA EVENT (OUTPATIENT)
Dept: CARDIOLOGY | Facility: MEDICAL CENTER | Age: 81
End: 2021-08-13
Payer: MEDICARE

## 2021-08-13 ENCOUNTER — HOSPITAL ENCOUNTER (OUTPATIENT)
Facility: MEDICAL CENTER | Age: 81
End: 2021-08-13
Attending: INTERNAL MEDICINE | Admitting: INTERNAL MEDICINE
Payer: MEDICARE

## 2021-08-13 ENCOUNTER — ANESTHESIA (OUTPATIENT)
Dept: CARDIOLOGY | Facility: MEDICAL CENTER | Age: 81
End: 2021-08-13
Payer: MEDICARE

## 2021-08-13 VITALS
BODY MASS INDEX: 31.58 KG/M2 | RESPIRATION RATE: 16 BRPM | OXYGEN SATURATION: 94 % | WEIGHT: 213.19 LBS | TEMPERATURE: 97.7 F | HEART RATE: 66 BPM | SYSTOLIC BLOOD PRESSURE: 145 MMHG | DIASTOLIC BLOOD PRESSURE: 75 MMHG | HEIGHT: 69 IN

## 2021-08-13 DIAGNOSIS — I48.0 PAROXYSMAL ATRIAL FIBRILLATION (HCC): ICD-10-CM

## 2021-08-13 LAB
ACT BLD: 279 SEC (ref 74–137)
ACT BLD: 279 SEC (ref 74–137)
ALBUMIN SERPL BCP-MCNC: 3.8 G/DL (ref 3.2–4.9)
ALBUMIN/GLOB SERPL: 1.7 G/DL
ALP SERPL-CCNC: 80 U/L (ref 30–99)
ALT SERPL-CCNC: 20 U/L (ref 2–50)
ANION GAP SERPL CALC-SCNC: 8 MMOL/L (ref 7–16)
AST SERPL-CCNC: 14 U/L (ref 12–45)
BASOPHILS # BLD AUTO: 0.5 % (ref 0–1.8)
BASOPHILS # BLD: 0.03 K/UL (ref 0–0.12)
BILIRUB SERPL-MCNC: 0.5 MG/DL (ref 0.1–1.5)
BUN SERPL-MCNC: 20 MG/DL (ref 8–22)
CALCIUM SERPL-MCNC: 8.4 MG/DL (ref 8.5–10.5)
CHLORIDE SERPL-SCNC: 106 MMOL/L (ref 96–112)
CO2 SERPL-SCNC: 27 MMOL/L (ref 20–33)
CREAT SERPL-MCNC: 1.24 MG/DL (ref 0.5–1.4)
EKG IMPRESSION: NORMAL
EKG IMPRESSION: NORMAL
EOSINOPHIL # BLD AUTO: 0.14 K/UL (ref 0–0.51)
EOSINOPHIL NFR BLD: 2.4 % (ref 0–6.9)
ERYTHROCYTE [DISTWIDTH] IN BLOOD BY AUTOMATED COUNT: 50.3 FL (ref 35.9–50)
GLOBULIN SER CALC-MCNC: 2.3 G/DL (ref 1.9–3.5)
GLUCOSE SERPL-MCNC: 86 MG/DL (ref 65–99)
HCT VFR BLD AUTO: 41.5 % (ref 42–52)
HGB BLD-MCNC: 13.3 G/DL (ref 14–18)
IMM GRANULOCYTES # BLD AUTO: 0.11 K/UL (ref 0–0.11)
IMM GRANULOCYTES NFR BLD AUTO: 1.9 % (ref 0–0.9)
INR PPP: 1.25 (ref 0.87–1.13)
LYMPHOCYTES # BLD AUTO: 1.27 K/UL (ref 1–4.8)
LYMPHOCYTES NFR BLD: 22.2 % (ref 22–41)
MCH RBC QN AUTO: 32.2 PG (ref 27–33)
MCHC RBC AUTO-ENTMCNC: 32 G/DL (ref 33.7–35.3)
MCV RBC AUTO: 100.5 FL (ref 81.4–97.8)
MONOCYTES # BLD AUTO: 0.74 K/UL (ref 0–0.85)
MONOCYTES NFR BLD AUTO: 12.9 % (ref 0–13.4)
NEUTROPHILS # BLD AUTO: 3.43 K/UL (ref 1.82–7.42)
NEUTROPHILS NFR BLD: 60.1 % (ref 44–72)
NRBC # BLD AUTO: 0 K/UL
NRBC BLD-RTO: 0 /100 WBC
PLATELET # BLD AUTO: 189 K/UL (ref 164–446)
PMV BLD AUTO: 10.7 FL (ref 9–12.9)
POTASSIUM SERPL-SCNC: 4.4 MMOL/L (ref 3.6–5.5)
PROT SERPL-MCNC: 6.1 G/DL (ref 6–8.2)
PROTHROMBIN TIME: 15.4 SEC (ref 12–14.6)
RBC # BLD AUTO: 4.13 M/UL (ref 4.7–6.1)
SODIUM SERPL-SCNC: 141 MMOL/L (ref 135–145)
WBC # BLD AUTO: 5.7 K/UL (ref 4.8–10.8)

## 2021-08-13 PROCEDURE — 93306 TTE W/DOPPLER COMPLETE: CPT

## 2021-08-13 PROCEDURE — 700111 HCHG RX REV CODE 636 W/ 250 OVERRIDE (IP)

## 2021-08-13 PROCEDURE — 93325 DOPPLER ECHO COLOR FLOW MAPG: CPT

## 2021-08-13 PROCEDURE — 700105 HCHG RX REV CODE 258: Performed by: INTERNAL MEDICINE

## 2021-08-13 PROCEDURE — 85610 PROTHROMBIN TIME: CPT

## 2021-08-13 PROCEDURE — 85347 COAGULATION TIME ACTIVATED: CPT

## 2021-08-13 PROCEDURE — C1894 INTRO/SHEATH, NON-LASER: HCPCS

## 2021-08-13 PROCEDURE — 93655 ICAR CATH ABLTJ DSCRT ARRHYT: CPT | Performed by: INTERNAL MEDICINE

## 2021-08-13 PROCEDURE — 700111 HCHG RX REV CODE 636 W/ 250 OVERRIDE (IP): Performed by: ANESTHESIOLOGY

## 2021-08-13 PROCEDURE — 93613 INTRACARDIAC EPHYS 3D MAPG: CPT | Performed by: INTERNAL MEDICINE

## 2021-08-13 PROCEDURE — 160002 HCHG RECOVERY MINUTES (STAT)

## 2021-08-13 PROCEDURE — 700101 HCHG RX REV CODE 250: Performed by: ANESTHESIOLOGY

## 2021-08-13 PROCEDURE — 93005 ELECTROCARDIOGRAM TRACING: CPT | Performed by: INTERNAL MEDICINE

## 2021-08-13 PROCEDURE — 93010 ELECTROCARDIOGRAM REPORT: CPT | Mod: 59 | Performed by: INTERNAL MEDICINE

## 2021-08-13 PROCEDURE — 93656 COMPRE EP EVAL ABLTJ ATR FIB: CPT | Performed by: INTERNAL MEDICINE

## 2021-08-13 PROCEDURE — 85025 COMPLETE CBC W/AUTO DIFF WBC: CPT

## 2021-08-13 PROCEDURE — 93662 INTRACARDIAC ECG (ICE): CPT | Mod: 26 | Performed by: INTERNAL MEDICINE

## 2021-08-13 PROCEDURE — 80053 COMPREHEN METABOLIC PANEL: CPT

## 2021-08-13 RX ORDER — OXYCODONE HCL 5 MG/5 ML
10 SOLUTION, ORAL ORAL
Status: DISCONTINUED | OUTPATIENT
Start: 2021-08-13 | End: 2021-08-13 | Stop reason: HOSPADM

## 2021-08-13 RX ORDER — HYDROMORPHONE HYDROCHLORIDE 1 MG/ML
0.2 INJECTION, SOLUTION INTRAMUSCULAR; INTRAVENOUS; SUBCUTANEOUS
Status: DISCONTINUED | OUTPATIENT
Start: 2021-08-13 | End: 2021-08-13 | Stop reason: HOSPADM

## 2021-08-13 RX ORDER — HYDROMORPHONE HYDROCHLORIDE 1 MG/ML
0.4 INJECTION, SOLUTION INTRAMUSCULAR; INTRAVENOUS; SUBCUTANEOUS
Status: DISCONTINUED | OUTPATIENT
Start: 2021-08-13 | End: 2021-08-13 | Stop reason: HOSPADM

## 2021-08-13 RX ORDER — HYDROMORPHONE HYDROCHLORIDE 1 MG/ML
0.1 INJECTION, SOLUTION INTRAMUSCULAR; INTRAVENOUS; SUBCUTANEOUS
Status: DISCONTINUED | OUTPATIENT
Start: 2021-08-13 | End: 2021-08-13 | Stop reason: HOSPADM

## 2021-08-13 RX ORDER — OMEPRAZOLE 20 MG/1
20 CAPSULE, DELAYED RELEASE ORAL DAILY
Qty: 30 CAPSULE | Refills: 0 | Status: SHIPPED | OUTPATIENT
Start: 2021-08-13 | End: 2021-12-17

## 2021-08-13 RX ORDER — OXYCODONE HCL 5 MG/5 ML
5 SOLUTION, ORAL ORAL
Status: DISCONTINUED | OUTPATIENT
Start: 2021-08-13 | End: 2021-08-13 | Stop reason: HOSPADM

## 2021-08-13 RX ORDER — DEXAMETHASONE SODIUM PHOSPHATE 4 MG/ML
INJECTION, SOLUTION INTRA-ARTICULAR; INTRALESIONAL; INTRAMUSCULAR; INTRAVENOUS; SOFT TISSUE PRN
Status: DISCONTINUED | OUTPATIENT
Start: 2021-08-13 | End: 2021-08-13 | Stop reason: SURG

## 2021-08-13 RX ORDER — DIPHENHYDRAMINE HYDROCHLORIDE 50 MG/ML
12.5 INJECTION INTRAMUSCULAR; INTRAVENOUS
Status: DISCONTINUED | OUTPATIENT
Start: 2021-08-13 | End: 2021-08-13 | Stop reason: HOSPADM

## 2021-08-13 RX ORDER — ACETAMINOPHEN 325 MG/1
650 TABLET ORAL EVERY 4 HOURS PRN
Status: DISCONTINUED | OUTPATIENT
Start: 2021-08-13 | End: 2021-08-13 | Stop reason: HOSPADM

## 2021-08-13 RX ORDER — PROTAMINE SULFATE 10 MG/ML
INJECTION, SOLUTION INTRAVENOUS
Status: COMPLETED
Start: 2021-08-13 | End: 2021-08-13

## 2021-08-13 RX ORDER — LIDOCAINE HYDROCHLORIDE 20 MG/ML
INJECTION, SOLUTION EPIDURAL; INFILTRATION; INTRACAUDAL; PERINEURAL PRN
Status: DISCONTINUED | OUTPATIENT
Start: 2021-08-13 | End: 2021-08-13 | Stop reason: SURG

## 2021-08-13 RX ORDER — LIDOCAINE HYDROCHLORIDE 20 MG/ML
INJECTION, SOLUTION INFILTRATION; PERINEURAL
Status: COMPLETED
Start: 2021-08-13 | End: 2021-08-13

## 2021-08-13 RX ORDER — BUPIVACAINE HYDROCHLORIDE 2.5 MG/ML
INJECTION, SOLUTION EPIDURAL; INFILTRATION; INTRACAUDAL
Status: COMPLETED
Start: 2021-08-13 | End: 2021-08-13

## 2021-08-13 RX ORDER — HEPARIN SODIUM 200 [USP'U]/100ML
INJECTION, SOLUTION INTRAVENOUS
Status: COMPLETED
Start: 2021-08-13 | End: 2021-08-13

## 2021-08-13 RX ORDER — HALOPERIDOL 5 MG/ML
1 INJECTION INTRAMUSCULAR
Status: DISCONTINUED | OUTPATIENT
Start: 2021-08-13 | End: 2021-08-13 | Stop reason: HOSPADM

## 2021-08-13 RX ORDER — ONDANSETRON 2 MG/ML
4 INJECTION INTRAMUSCULAR; INTRAVENOUS
Status: DISCONTINUED | OUTPATIENT
Start: 2021-08-13 | End: 2021-08-13 | Stop reason: HOSPADM

## 2021-08-13 RX ORDER — HEPARIN SODIUM 1000 [USP'U]/ML
INJECTION, SOLUTION INTRAVENOUS; SUBCUTANEOUS
Status: COMPLETED
Start: 2021-08-13 | End: 2021-08-13

## 2021-08-13 RX ORDER — SUCRALFATE 1 G/1
1 TABLET ORAL
Qty: 56 TABLET | Refills: 0 | Status: SHIPPED | OUTPATIENT
Start: 2021-08-13 | End: 2021-08-27

## 2021-08-13 RX ORDER — SODIUM CHLORIDE, SODIUM LACTATE, POTASSIUM CHLORIDE, CALCIUM CHLORIDE 600; 310; 30; 20 MG/100ML; MG/100ML; MG/100ML; MG/100ML
INJECTION, SOLUTION INTRAVENOUS CONTINUOUS
Status: ACTIVE | OUTPATIENT
Start: 2021-08-13 | End: 2021-08-13

## 2021-08-13 RX ORDER — MEPERIDINE HYDROCHLORIDE 25 MG/ML
12.5 INJECTION INTRAMUSCULAR; INTRAVENOUS; SUBCUTANEOUS
Status: DISCONTINUED | OUTPATIENT
Start: 2021-08-13 | End: 2021-08-13 | Stop reason: HOSPADM

## 2021-08-13 RX ORDER — ONDANSETRON 2 MG/ML
INJECTION INTRAMUSCULAR; INTRAVENOUS PRN
Status: DISCONTINUED | OUTPATIENT
Start: 2021-08-13 | End: 2021-08-13 | Stop reason: SURG

## 2021-08-13 RX ADMIN — SODIUM CHLORIDE, POTASSIUM CHLORIDE, SODIUM LACTATE AND CALCIUM CHLORIDE: 600; 310; 30; 20 INJECTION, SOLUTION INTRAVENOUS at 07:24

## 2021-08-13 RX ADMIN — PROPOFOL 150 MG: 10 INJECTION, EMULSION INTRAVENOUS at 08:32

## 2021-08-13 RX ADMIN — DEXAMETHASONE SODIUM PHOSPHATE 4 MG: 4 INJECTION, SOLUTION INTRA-ARTICULAR; INTRALESIONAL; INTRAMUSCULAR; INTRAVENOUS; SOFT TISSUE at 08:32

## 2021-08-13 RX ADMIN — ONDANSETRON 4 MG: 2 INJECTION INTRAMUSCULAR; INTRAVENOUS at 08:32

## 2021-08-13 RX ADMIN — LIDOCAINE HYDROCHLORIDE 100 MG: 20 INJECTION, SOLUTION EPIDURAL; INFILTRATION; INTRACAUDAL at 08:32

## 2021-08-13 RX ADMIN — SUGAMMADEX 200 MG: 100 INJECTION, SOLUTION INTRAVENOUS at 10:41

## 2021-08-13 RX ADMIN — ROCURONIUM BROMIDE 80 MG: 10 INJECTION, SOLUTION INTRAVENOUS at 08:32

## 2021-08-13 RX ADMIN — HEPARIN SODIUM: 200 INJECTION, SOLUTION INTRAVENOUS at 09:00

## 2021-08-13 RX ADMIN — HEPARIN SODIUM: 200 INJECTION, SOLUTION INTRAVENOUS at 08:55

## 2021-08-13 RX ADMIN — HEPARIN SODIUM: 1000 INJECTION, SOLUTION INTRAVENOUS; SUBCUTANEOUS at 10:24

## 2021-08-13 RX ADMIN — PROTAMINE SULFATE 40 MG: 10 INJECTION, SOLUTION INTRAVENOUS at 10:30

## 2021-08-13 ASSESSMENT — PAIN DESCRIPTION - PAIN TYPE
TYPE: ACUTE PAIN

## 2021-08-13 ASSESSMENT — PAIN SCALES - GENERAL: PAIN_LEVEL: 2

## 2021-08-13 ASSESSMENT — FIBROSIS 4 INDEX: FIB4 SCORE: 1.79

## 2021-08-13 NOTE — H&P
"EP Pre-procedure History and Physical    Date of service: 8/13/2021    Reason for visit/Chief complaint: PAF/AFL    HPI:   Patient is an 82 yo M. History of pAF/AFL. Here for AF and AFL ablation.    Past Medical History:   Diagnosis Date   • Arrhythmia     a-fib   • Cataract     bilat sugery   • Hemorrhagic disorder (HCC)     eliuquis   • Hyperlipidemia    • Hypertension    • Psychiatric problem    • Sleep apnea     cpap   • Thyroid disease     Hasimotos     Past Surgical History:   Procedure Laterality Date   • CATARACT EXTRACTION WITH IOL     • DENTAL SURGERY     • HERNIA REPAIR     • OTHER ORTHOPEDIC SURGERY      thumb repair   • TONSILLECTOMY       Family History   Problem Relation Age of Onset   • Cancer Mother         breast cancer   • Thyroid Sister         Hasimotos   • Diabetes Sister         Type 2 DM   • Thyroid Brother         Hasimotos   • Diabetes Brother         Type 2 DM   • No Known Problems Maternal Grandmother    • Heart Attack Maternal Grandfather         Physical Exam:  Vitals:    08/13/21 0627   BP: 146/68   Pulse: (!) 57   Resp: 16   Temp: 36.1 °C (97 °F)   TempSrc: Temporal   SpO2: 95%   Weight: 96.7 kg (213 lb 3 oz)   Height: 1.753 m (5' 9\")     Gen: NAD, conversant  HEENT: PERRL, EOMI  LUNGS: CTA B, no w/r/r  CV: RRR, no m/r/g, no JVD  Abd: Soft, NT/ND, +BS  Ext: no edema, warm and well perfused    Labs reviewed    EKG interpreted by me:  Sinus marvin    Impression/Recs:  1. Paroxysmal atrial fibrillation (HCC)  EC-ROSALINDA W/ CONT    EC-ROSALINDA W/ CONT    CL-EP ABLATION ATRIAL FIBRILLATION    CL-EP ABLATION ATRIAL FIBRILLATION     -Risks/benefits/alternatives discussed  -All questions answered  -Proceed with AF/AFL ablation    Orquidea Luna MD    "

## 2021-08-13 NOTE — OR NURSING
1516 Report received from PACU RN. Pt arrived to Phase II into room 32. AAOx4. VSS. Denies pain and nausea. Bilateral groins and right radial dressings CDI and soft. Belongings returned to pt bedside. POC update given to pt and to wife, Kandy over the phone.    1534 Discharge instructions reviewed with pt and Kandy, wife, over the phone. All verbalize understanding and demonstrate teach back.     1550 Discharge criteria met. PIV removed. Pt dressed and ambulated to bathroom.     1600 Discharged via wheelchair with RN escort to responsible adult. All belongings with pt.

## 2021-08-13 NOTE — OR NURSING
1054 Pt arrived from cath lab via gurney. Report given by RN and Anesthesia. Awake. 4L mask. Even non labored breathing. Lungs clear. SB-SR. Bilateral groin sites, soft, no hematoma, dressings cdi, strong 2+ pedal pulse, cms intact. PIV patent. R rad art, cms intact. Denies pain, nausea, sob, chest pain. Flat supine, strict bed rest 4 hours.     1100 2.5L NC    1112 EKG at bedside    1115 awake and alert. Tolerated sips of water. Denies pain, nausea, sob chest pain. Bilateral groin sites, soft, no hematoma, dressings cdi, strong 2+ pedal pulse, cms intact. Flat supine.     1127 updated Kandy, spouse. O2 tank at 50% full for transfer    1130 500cc yellow clear, urinal    1145 calm, even non labored breathing. Skin pink warm dry.    1215 awake. denies pain, nausea, sob, chest pain. Bilateral groin sites, soft, no hematoma, dressings cdi, strong 2+ pedal pulse, cms intact.     1245 no changes.     1315 no changes    1330 eating popsicle. Flat supine, reverse trendelenburg.    1400 awake and alert denies pain.     1430 no changes     1445 arterial line removed.     1500 95% RA IS 2500cc Report given to VITO RN phase 2. Strict bed rest completed. VSS. Kandy, spouse undated.

## 2021-08-13 NOTE — ANESTHESIA PREPROCEDURE EVALUATION
Relevant Problems   ANESTHESIA   (positive) Sleep apnea      CARDIAC   (positive) Essential hypertension   (positive) Paroxysmal atrial fibrillation (HCC)         (positive) Chronic renal impairment, stage 1      ENDO   (positive) Acquired hypothyroidism       Physical Exam    Airway   Mallampati: II  TM distance: >3 FB  Neck ROM: full       Cardiovascular - normal exam  Rhythm: regular  Rate: normal  (-) murmur     Dental - normal exam           Pulmonary - normal exam  Breath sounds clear to auscultation     Abdominal    Neurological - normal exam                 Anesthesia Plan    ASA 3       Plan - general       Airway plan will be ETT  ROSALINDA Planned        Induction: intravenous    Postoperative Plan: Postoperative administration of opioids is intended.    Pertinent diagnostic labs and testing reviewed    Informed Consent:    Anesthetic plan and risks discussed with patient.    Use of blood products discussed with: patient whom consented to blood products.

## 2021-08-13 NOTE — ANESTHESIA POSTPROCEDURE EVALUATION
Patient: Donny Haas    Procedure Summary     Date: 08/13/21 Room / Location: [facility] IMAGING - CATH LAB [facility]    Anesthesia Start: 0811 Anesthesia Stop: 1120    Procedure: CL-EP ABLATION ATRIAL FIBRILLATION Diagnosis:       Paroxysmal atrial fibrillation (HCC)      Paroxysmal atrial fibrillation      (See Associated Dx)    Scheduled Providers: Orquidea Luna M.D.; Alfonso Jiménez M.D. Responsible Provider: Alfonso Jiménez M.D.    Anesthesia Type: general ASA Status: 3          Final Anesthesia Type: general  Last vitals  BP   Blood Pressure : 156/77    Temp   36.5 °C (97.7 °F)    Pulse   60   Resp   20    SpO2   96 %      Anesthesia Post Evaluation    Patient location during evaluation: PACU  Patient participation: complete - patient participated  Level of consciousness: awake and alert  Pain score: 2    Airway patency: patent  Anesthetic complications: no  Cardiovascular status: hemodynamically stable  Respiratory status: acceptable  Hydration status: euvolemic    PONV: none          No complications documented.     Nurse Pain Score: 0 (NPRS)

## 2021-08-13 NOTE — DISCHARGE INSTRUCTIONS
ACTIVITY: Rest and take it easy for the first 24 hours.  A responsible adult is recommended to remain with you during that time.  It is normal to feel sleepy.  We encourage you to not do anything that requires balance, judgment or coordination.    MILD FLU-LIKE SYMPTOMS ARE NORMAL. YOU MAY EXPERIENCE GENERALIZED MUSCLE ACHES, THROAT IRRITATION, HEADACHE AND/OR SOME NAUSEA.    FOR 24 HOURS DO NOT:  Drive, operate machinery or run household appliances.  Drink beer or alcoholic beverages.   Make important decisions or sign legal documents.    SPECIAL INSTRUCTIONS:   Cardiac Ablation, Care After  This sheet gives you information about how to care for yourself after your procedure. Your health care provider may also give you more specific instructions. If you have problems or questions, contact your health care provider.  What can I expect after the procedure?  After the procedure, it is common to have:  · Bruising around your puncture site.  · Tenderness around your puncture site.  · Skipped heartbeats.  · Tiredness (fatigue).  Follow these instructions at home:  Puncture site care    · Follow instructions from your health care provider about how to take care of your puncture site. Make sure you:  ? Wash your hands with soap and water before you change your bandage (dressing). If soap and water are not available, use hand .  ? Change your dressing as told by your health care provider.  ? Leave stitches (sutures), skin glue, or adhesive strips in place. These skin closures may need to stay in place for up to 2 weeks. If adhesive strip edges start to loosen and curl up, you may trim the loose edges. Do not remove adhesive strips completely unless your health care provider tells you to do that.  · Check your puncture site every day for signs of infection. Check for:  ? Redness, swelling, or pain.  ? Fluid or blood. If your puncture site starts to bleed, lie down on your back, apply firm pressure to the area, and  contact your health care provider.  ? Warmth.  ? Pus or a bad smell.  Driving  · Ask your health care provider when it is safe for you to drive again after the procedure.  · Do not drive or use heavy machinery while taking prescription pain medicine.  · Do not drive for 24 hours if you were given a medicine to help you relax (sedative) during your procedure.  Activity  · Avoid activities that take a lot of effort for at least 3 days after your procedure.  · Do not lift anything that is heavier than 10 lb (4.5 kg), or the limit that you are told, until your health care provider says that it is safe.  · Return to your normal activities as told by your health care provider. Ask your health care provider what activities are safe for you.  General instructions  · Take over-the-counter and prescription medicines only as told by your health care provider.  · Do not use any products that contain nicotine or tobacco, such as cigarettes and e-cigarettes. If you need help quitting, ask your health care provider.  · Do not take baths, swim, or use a hot tub until your health care provider approves.  · Do not drink alcohol for 24 hours after your procedure.  · Keep all follow-up visits as told by your health care provider. This is important.  Contact a health care provider if:  · You have redness, mild swelling, or pain around your puncture site.  · You have fluid or blood coming from your puncture site that stops after applying firm pressure to the area.  · Your puncture site feels warm to the touch.  · You have pus or a bad smell coming from your puncture site.  · You have a fever.  · You have chest pain or discomfort that spreads to your neck, jaw, or arm.  · You are sweating a lot.  · You feel nauseous.  · You have a fast or irregular heartbeat.  · You have shortness of breath.  · You are dizzy or light-headed and feel the need to lie down.  · You have pain or numbness in the arm or leg closest to your puncture site.  Get  help right away if:  · Your puncture site suddenly swells.  · Your puncture site is bleeding and the bleeding does not stop after applying firm pressure to the area.  These symptoms may represent a serious problem that is an emergency. Do not wait to see if the symptoms will go away. Get medical help right away. Call your local emergency services (911 in the [place] Do not drive yourself to the hospital.  Summary  · After the procedure, it is normal to have bruising and tenderness at the puncture site in your groin, neck, or forearm.  · Check your puncture site every day for signs of infection.  · Get help right away if your puncture site is bleeding and the bleeding does not stop after applying firm pressure to the area. This is a medical emergency.  This information is not intended to replace advice given to you by your health care provider. Make sure you discuss any questions you have with your health care provider.      DIET: To avoid nausea, slowly advance diet as tolerated, avoiding spicy or greasy foods for the first day.  Add more substantial food to your diet according to your physician's instructions.  Babies can be fed formula or breast milk as soon as they are hungry.  INCREASE FLUIDS AND FIBER TO AVOID CONSTIPATION.    SURGICAL DRESSING/BATHING: Keep dressing dry for 24 hours. May remove dressing and shower after 4pm on 8/14, do not need to replace dressing. Do not submerge in water or bath for 7 days.     FOLLOW-UP APPOINTMENT:  A follow-up appointment should be arranged with your doctor in 1-2 Weeks; call to schedule.    You should CALL YOUR PHYSICIAN if you develop:  Fever greater than 101 degrees F.  Pain not relieved by medication, or persistent nausea or vomiting.  Excessive bleeding (blood soaking through dressing) or unexpected drainage from the wound.  Extreme redness or swelling around the incision site, drainage of pus or foul smelling drainage.  Inability to urinate or empty your bladder  within 8 hours.  Problems with breathing or chest pain.    You should call 911 if you develop problems with breathing or chest pain.  If you are unable to contact your doctor or surgical center, you should go to the nearest emergency room or urgent care center.      Physician's telephone #: [phone] Dr. Luna    If any questions arise, call your doctor.  If your doctor is not available, please feel free to call the Surgical Center at [phone]. The Contact Center is open Monday through Friday 7AM to 5PM and may speak to a nurse at [phone], or toll free at [phone].     A registered nurse may call you a few days after your surgery to see how you are doing after your procedure.    MEDICATIONS: Resume taking daily medication.  Take prescribed pain medication with food.  If no medication is prescribed, you may take non-aspirin pain medication if needed.  PAIN MEDICATION CAN BE VERY CONSTIPATING.  Take a stool softener or laxative such as senokot, pericolace, or milk of magnesia if needed.    Prescription given for Prilosec, Carafate.     If your physician has prescribed pain medication that includes Acetaminophen (Tylenol), do not take additional Acetaminophen (Tylenol) while taking the prescribed medication.    Depression / Suicide Risk    As you are discharged from this [facility] facility, it is important to learn how to keep safe from harming yourself.    Recognize the warning signs:  · Abrupt changes in personality, positive or negative- including increase in energy   · Giving away possessions  · Change in eating patterns- significant weight changes-  positive or negative  · Change in sleeping patterns- unable to sleep or sleeping all the time   · Unwillingness or inability to communicate  · Depression  · Unusual sadness, discouragement and loneliness  · Talk of wanting to die  · Neglect of personal appearance   · Rebelliousness- reckless behavior  · Withdrawal from people/activities they  love  · Confusion- inability to concentrate     If you or a loved one observes any of these behaviors or has concerns about self-harm, here's what you can do:  · Talk about it- your feelings and reasons for harming yourself  · Remove any means that you might use to hurt yourself (examples: pills, rope, extension cords, firearm)  · Get professional help from the community (Mental Health, Substance Abuse, psychological counseling)  · Do not be alone:Call your Safe Contact- someone whom you trust who will be there for you.  · Call your local CRISIS HOTLINE 820-7094 or 331-582-5476  · Call your local Children's Mobile Crisis Response Team [place] (838) 313-1393 or www.OneStopWeb  · Call the toll free National Suicide Prevention Hotlines   · National Suicide Prevention Lifeline 651-722-QUHY (0768)  · National Hope Line Network 800-SUICIDE (496-5257)

## 2021-08-13 NOTE — ANESTHESIA TIME REPORT
Anesthesia Start and Stop Event Times     Date Time Event    8/13/2021 0804 Ready for Procedure     0811 Anesthesia Start     1120 Anesthesia Stop        Responsible Staff  08/13/21    Name Role Begin End    Alfonso Jiménez M.D. Anesth 0811 1120        Preop Diagnosis (Free Text):  Pre-op Diagnosis             Preop Diagnosis (Codes):    Post op Diagnosis  Atrial fibrillation and flutter      Premium Reason  Non-Premium    Comments: chris loyola

## 2021-08-13 NOTE — OP REPORT
[facility]  Electrophysiology Procedure Note    Procedure(s) Performed:   1) Atrial fibrillation ablation and EP study  2) Ablation of additional mechanism of tachycardia (RA flutter)  3) 3D mapping  4) ICE during diagnostics and intervention    Indication(s):  Paroxysmal atrial fibrillation/atrial flutter    Physician(s): Orquidea Luna M.D.     Resident/Assistant(s): None     Anesthesia: General endotracheal anesthetic with Dr. Jiménez     Specimen(s) Removed: None     Estimated Blood Loss:  30cc     Complications:  None     Description of Procedure:   After informed written consent, the patient was brought to the EP lab in the fasting, non-sedated state. The patient was prepped and draped in the usual sterile fashion. Femoral venous access was obtained using the modified Seldinger technique. In the left femoral vein, 1 sheaths (7Fr) were inserted over 0.035” guidewires. In the right femoral vein, 3 sheaths (8,8,10 Fr) were inserted over 0.035” guidewires. A deflectable decapolar catheter was advanced to the CS position. An intracardiac echo (ICE) catheter was advanced to the right atrium. ICE was used to identify the CTI, atrial septum, left atrial appendage, and pulmonary veins and yary the anatomic structures to superimpose on our 3D electroanatomic map using CARTOSound. During the procedure, ICE was utilized to localize the ablation catheter, monitor for thrombus formation, and to exclude pericardial effusion. A RAMP sheath was exchanged and ablation along the CTI was performed at 40W. CTI was relatively long. Additional ablation after initial line towards the valve end was performed to achieve durable bidirectional block. Trans-isthmus conduction time of > 170 msec. Next iIntravenous heparin was administered to maintain -350 seconds. Double transseptal left heart catheterization was performed under intracardiac echo and hemodynamic guidance. A Frankford needle was inserted into the 8.5 Fr sheaths  ([place] Torflex) for transseptal puncture and placement of sheaths in the left atrium. The initial trans-septal was exchanged for a FreakOut mappable sheath and the Torflex re-used for a second trans-septal. Mapping of the pulmonary veins and left atrium was performed using CARTO3 FAM and a deflectable multipolar mapping catheter (BiosSmartaxi PentaRay). Wide antral circumferential ablation was performed using an 3.5 mm deflectable irrigated force contact catheter (Biosense SmartTouch) at primarily 30-40 W power starting from the L sided veins and then proceeding along to the RSPV and then finally the RIPV. Bidirectional pulmonary vein antrum isolation was verified at the end of the procedure by pacing inside the vein and confirming exit block along with absence of local PV signals on the PentaRay. Prior to ending the procedure we checked our initial flutter line which had reconnected, and additional ablation towards the valve was again performed, this time slightly more posterior to our initial touch up to achieve block again. At the end of the procedure, heparin was reversed with protamine, the catheter and sheaths were removed, and hemostasis was achieved by manual compression along with Vascade MVP closure device. Following recovery from anesthesia, the patient was transferred to the PPU in good condition.       Total ablation time: 1751 seconds    Fluoroscopy time: 9.9 minutes     Electrophysiologic Findings:    1. Sinus  1306 ms, AH 88 ms, HV 37 ms. AVBCL = 600 ms.    Impressions:    1. Paroxysmal atrial fibrillation.    2. Successful RF ablation pulmonary vein isolation procedure.    3. Successful RF ablation of RA flutter     Recommendations:  1. Resume anticoagulation.  2. Start carafate x 2 weeks and daily PPI x 1 month.  3. Transfer to [facility] bedrest.

## 2021-08-30 ENCOUNTER — APPOINTMENT (OUTPATIENT)
Dept: ADMISSIONS | Facility: MEDICAL CENTER | Age: 81
End: 2021-08-30
Payer: MEDICARE

## 2021-09-01 ENCOUNTER — APPOINTMENT (OUTPATIENT)
Dept: CARDIOLOGY | Facility: MEDICAL CENTER | Age: 81
End: 2021-09-01
Attending: INTERNAL MEDICINE
Payer: MEDICARE

## 2021-09-03 LAB — LV EJECT FRACT  99904: 55

## 2021-09-07 ENCOUNTER — TELEPHONE (OUTPATIENT)
Dept: CARDIOLOGY | Facility: MEDICAL CENTER | Age: 81
End: 2021-09-07

## 2021-09-07 NOTE — TELEPHONE ENCOUNTER
SS     Pt called asking if his upcoming appt is to discuss whether or not he can stop his multaq and metoprolol. Please call Pt back at [phone].    Thank you

## 2021-09-07 NOTE — TELEPHONE ENCOUNTER
Called and spoke to patient. Pt has questions about his Multaq, wanting to know if he has to stay on this med, pt states it costs him $350 dollars monthly. Explained to pt that Multaq is an antiarrythmic and we usually see pt in clinic before determining if its ok to stop/change therapy. Pt agreeable however he cannot make it to scheduled appt 9/15 with EA. R/s for tomorrow 9/8 with SS. Confirmed appt date and time.

## 2021-09-08 ENCOUNTER — OFFICE VISIT (OUTPATIENT)
Dept: CARDIOLOGY | Facility: MEDICAL CENTER | Age: 81
End: 2021-09-08
Payer: MEDICARE

## 2021-09-08 VITALS
SYSTOLIC BLOOD PRESSURE: 140 MMHG | HEIGHT: 69 IN | DIASTOLIC BLOOD PRESSURE: 70 MMHG | HEART RATE: 61 BPM | OXYGEN SATURATION: 91 % | WEIGHT: 214 LBS | BODY MASS INDEX: 31.7 KG/M2

## 2021-09-08 DIAGNOSIS — Z79.899 ON MULTAQ THERAPY: ICD-10-CM

## 2021-09-08 DIAGNOSIS — I48.0 PAROXYSMAL ATRIAL FIBRILLATION (HCC): ICD-10-CM

## 2021-09-08 PROCEDURE — 99214 OFFICE O/P EST MOD 30 MIN: CPT | Performed by: INTERNAL MEDICINE

## 2021-09-08 PROCEDURE — 93000 ELECTROCARDIOGRAM COMPLETE: CPT | Performed by: INTERNAL MEDICINE

## 2021-09-08 RX ORDER — VITAMIN B COMPLEX
1000 TABLET ORAL
COMMUNITY
End: 2023-05-28

## 2021-09-08 ASSESSMENT — FIBROSIS 4 INDEX: FIB4 SCORE: 1.33

## 2021-09-09 NOTE — PROGRESS NOTES
Arrhythmia Clinic Note (Established patient)    DOS: 2021    Chief complaint/Reason for consult: F/u AF ablation    Interval History:  Pt is an 82 yo M. History of sleep apnea on CPAP, PAF s/p PVI isolation plus atrial flutter line with us back on . Done well since. No recurrence as far as he could tell. Still on Multaq which he is having some GI issues with.    ROS (+ highlighted in red):  General--Negative for fatigue, weight loss or weight gain  Cardiovascular--Negative for CP, orthopnea, PND    Past Medical History:   Diagnosis Date   • Arrhythmia     a-fib   • Cataract     bilat sugery   • Hemorrhagic disorder (HCC)     eliuquis   • Hyperlipidemia    • Hypertension    • Psychiatric problem    • Sleep apnea     cpap   • Thyroid disease     Hasimotos       Past Surgical History:   Procedure Laterality Date   • CATARACT EXTRACTION WITH IOL     • DENTAL SURGERY     • HERNIA REPAIR     • OTHER ORTHOPEDIC SURGERY      thumb repair   • TONSILLECTOMY         Social History     Socioeconomic History   • Marital status:      Spouse name: Not on file   • Number of children: Not on file   • Years of education: Not on file   • Highest education level: Not on file   Occupational History   • Not on file   Tobacco Use   • Smoking status: Former Smoker     Packs/day: 1.00     Years: 19.00     Pack years: 19.00     Types: Cigarettes     Quit date: 3/22/1979     Years since quittin.4   • Smokeless tobacco: Never Used   Vaping Use   • Vaping Use: Never used   Substance and Sexual Activity   • Alcohol use: Yes     Alcohol/week: 0.0 oz     Comment: 4 drinks a week.   • Drug use: No   • Sexual activity: Yes     Partners: Female   Other Topics Concern   • Not on file   Social History Narrative   • Not on file     Social Determinants of Health     Financial Resource Strain:    • Difficulty of Paying Living Expenses:    Food Insecurity:    • Worried About Running Out of Food in the Last Year:    • Ran Out of Food in  the Last Year:    Transportation Needs:    • Lack of Transportation (Medical):    • Lack of Transportation (Non-Medical):    Physical Activity:    • Days of Exercise per Week:    • Minutes of Exercise per Session:    Stress:    • Feeling of Stress :    Social Connections:    • Frequency of Communication with Friends and Family:    • Frequency of Social Gatherings with Friends and Family:    • Attends [employer] Services:    • Active Member of Clubs or Organizations:    • Attends Club or Organization Meetings:    • Marital Status:    Intimate Partner Violence:    • Fear of Current or Ex-Partner:    • Emotionally Abused:    • Physically Abused:    • Sexually Abused:        Family History   Problem Relation Age of Onset   • Cancer Mother         breast cancer   • Thyroid Sister         Hasimotos   • Diabetes Sister         Type 2 DM   • Thyroid Brother         Hasimotos   • Diabetes Brother         Type 2 DM   • No Known Problems Maternal Grandmother    • Heart Attack Maternal Grandfather        Allergies   Allergen Reactions   • Penicillins Rash     rash   • Oxycodone-Acetaminophen Unspecified     Hallucinations per pt.    • Latex      Allergy test       Current Outpatient Medications   Medication Sig Dispense Refill   • vitamin D (CHOLECALCIFEROL) 1000 Unit (25 mcg) Tab Take 1,000 Units by mouth every 48 hours.     • FOLIC ACID PO Take 1 Tablet by mouth every evening. Liquid*     • omeprazole (PRILOSEC) 20 MG delayed-release capsule Take 1 Capsule by mouth every day. 30 Capsule 0   • dronedarone (MULTAQ) 400 MG Tab Take 1 tablet by mouth 2 times a day with meals. 180 tablet 3   • metoprolol SR (TOPROL XL) 25 MG TABLET SR 24 HR TAKE 1 TABLET BY MOUTH EVERY DAY 90 tablet 3   • FLUoxetine (PROZAC) 20 MG Cap Take 20 mg by mouth every morning.     • losartan (COZAAR) 50 MG Tab Take  mg by mouth 2 times a day. 50 mg in am and 100 mg in pm     • apixaban (ELIQUIS) 5mg Tab Take 1 tablet by mouth 2 times a day. 60 tablet  "11   • finasteride (PROSCAR) 5 MG Tab Take 5 mg by mouth every evening.     • Multiple Vitamins-Minerals (ICAPS AREDS 2 PO) Take 1 Capsule by mouth every evening.     • Multiple Vitamin (MULTI-VITAMIN DAILY PO) Take 1 Tablet by mouth every evening.     • atorvastatin (LIPITOR) 20 MG Tab TAKE 1 TABLET BY MOUTH EVERY DAY (Patient taking differently: Take 20 mg by mouth every evening.) 90 tablet 2   • terazosin (HYTRIN) 2 MG Cap TAKE 1 CAPSULE BY MOUTH EVERY 24 HOURS (Patient taking differently: Take 2 mg by mouth every evening.) 90 capsule 2   • vitamin A 3 mg (79949 units) capsule Take 10,000 Units by mouth every 48 hours. (Patient not taking: Reported on 9/8/2021)     • Multiple Vitamins-Minerals (DRY EYE FORMULA PO) Take 1-2 Drops by mouth 1 time a day as needed. (Patient not taking: Reported on 9/8/2021)     • levothyroxine (SYNTHROID) 100 MCG Tab TAKE 1 TABLET BY MOUTH EVERY 24 HOURS (Patient taking differently: Take 100 mcg by mouth every morning on an empty stomach.) 90 tablet 2     No current facility-administered medications for this visit.       Physical Exam:  Vitals:    09/08/21 1518   BP: 140/70   BP Location: Left arm   Patient Position: Sitting   BP Cuff Size: Adult   Pulse: 61   SpO2: 91%   Weight: 97.1 kg (214 lb)   Height: 1.753 m (5' 9\")     General appearance: NAD, conversant  HEENT: PERRL, neck is supple with FROM  Lungs: Clear to auscultation, normal respiratory effort  CV: RRR, no murmurs/rubs/gallops, no JVD  Abdomen: Soft, non-tender with normal bowel sounds  Extremities: No peripheral edema, no clubbing or cyanosis  Skin: No rash, lesions, or ulcers  Psych: Alert and oriented to person, place and time    Data:  Labs reviewed    Prior echo/stress reviewed:  LVEF 55%    EKG interpreted by me:  NATE    Impression/Plan:  1. Paroxysmal atrial fibrillation (HCC)  EKG    [facility] PATCH MONITOR   2. On Multaq therapy       -Stop multaq today  -Continue low dose Toprol  -Zio off multaq    [place] " MD

## 2021-09-30 ENCOUNTER — NON-PROVIDER VISIT (OUTPATIENT)
Dept: CARDIOLOGY | Facility: MEDICAL CENTER | Age: 81
End: 2021-09-30
Payer: MEDICARE

## 2021-09-30 ENCOUNTER — TELEPHONE (OUTPATIENT)
Dept: CARDIOLOGY | Facility: MEDICAL CENTER | Age: 81
End: 2021-09-30

## 2021-09-30 DIAGNOSIS — I48.0 PAROXYSMAL ATRIAL FIBRILLATION (HCC): ICD-10-CM

## 2021-09-30 DIAGNOSIS — Z86.79 ATRIAL FIBRILLATION, CURRENTLY IN SINUS RHYTHM: ICD-10-CM

## 2021-09-30 DIAGNOSIS — I49.3 VENTRICULAR ECTOPY: ICD-10-CM

## 2021-09-30 DIAGNOSIS — I47.19 ATRIAL TACHYCARDIA (HCC): ICD-10-CM

## 2021-09-30 NOTE — TELEPHONE ENCOUNTER
Patient enrolled in the 14 day Zio XT Holter monitoring program per Dr Orquidea Luna MD.  >In clinic hookup.  >Pending EOS.

## 2021-10-03 DIAGNOSIS — I48.0 PAROXYSMAL ATRIAL FIBRILLATION (HCC): ICD-10-CM

## 2021-10-05 LAB — EKG IMPRESSION: NORMAL

## 2021-10-05 RX ORDER — METOPROLOL SUCCINATE 25 MG/1
25 TABLET, EXTENDED RELEASE ORAL DAILY
Qty: 90 TABLET | Refills: 3 | Status: SHIPPED | OUTPATIENT
Start: 2021-10-05 | End: 2021-12-17

## 2021-10-28 PROCEDURE — 93248 EXT ECG>7D<15D REV&INTERPJ: CPT | Performed by: INTERNAL MEDICINE

## 2021-10-28 PROCEDURE — 93246 EXT ECG>7D<15D RECORDING: CPT | Performed by: INTERNAL MEDICINE

## 2021-11-05 ENCOUNTER — PATIENT MESSAGE (OUTPATIENT)
Dept: CARDIOLOGY | Facility: MEDICAL CENTER | Age: 81
End: 2021-11-05

## 2021-11-09 NOTE — PATIENT COMMUNICATION
Shindenis Luna M.D.  You 14 hours ago (5:28 PM)     Zio shows no AF. Results are good. Small runs of nonsustained atach, not clinically relevant. Would continue low dose BB    Message text      You  Orquidea Luna M.D. 4 days ago     Please advise     Message text

## 2021-12-13 RX ORDER — TERAZOSIN 2 MG/1
CAPSULE ORAL
Qty: 90 CAPSULE | Refills: 0 | Status: SHIPPED | OUTPATIENT
Start: 2021-12-13 | End: 2022-11-29

## 2021-12-17 ENCOUNTER — OFFICE VISIT (OUTPATIENT)
Dept: CARDIOLOGY | Facility: MEDICAL CENTER | Age: 81
End: 2021-12-17
Payer: MEDICARE

## 2021-12-17 VITALS
DIASTOLIC BLOOD PRESSURE: 78 MMHG | SYSTOLIC BLOOD PRESSURE: 136 MMHG | HEIGHT: 69 IN | WEIGHT: 218 LBS | RESPIRATION RATE: 14 BRPM | OXYGEN SATURATION: 96 % | HEART RATE: 59 BPM | BODY MASS INDEX: 32.29 KG/M2

## 2021-12-17 DIAGNOSIS — R07.89 OTHER CHEST PAIN: ICD-10-CM

## 2021-12-17 PROCEDURE — 99214 OFFICE O/P EST MOD 30 MIN: CPT | Performed by: INTERNAL MEDICINE

## 2021-12-17 RX ORDER — LEVOTHYROXINE SODIUM 112 UG/1
112 TABLET ORAL
COMMUNITY
Start: 2021-12-07

## 2021-12-17 RX ORDER — DILTIAZEM HYDROCHLORIDE 120 MG/1
120 CAPSULE, COATED, EXTENDED RELEASE ORAL DAILY
Qty: 90 CAPSULE | Refills: 3 | Status: SHIPPED | OUTPATIENT
Start: 2021-12-17 | End: 2022-12-19

## 2021-12-17 ASSESSMENT — FIBROSIS 4 INDEX: FIB4 SCORE: 1.33

## 2021-12-17 NOTE — PROGRESS NOTES
Cardiology Follow-up Consultation Note    Date of note:    12/17/2021    Primary Care Provider: Trina Awan M.D.    Name:             Donny Haas   YOB: 1940  MRN:               [id number]    CC: Follow-up atrial fibrillation, hypertension, dyslipidemia    Patient ID/HPI:   81-year-old male patient established in our office with history of atrial fibrillation s/p ablation of A. fib, flutter line, hypertension, dyslipidemia here for follow-up.  He complains of occasional chest pain middle of the chest last for about 1 to 2 minutes, feels like pressure.  Not related to activity.  Usually feels at night.  He also complains of fatigue gotten better since decreasing the metoprolol but still has fatigue.        Past Medical History:   Diagnosis Date   • Arrhythmia     a-fib   • Cataract     bilat sugery   • Hemorrhagic disorder (HCC)     eliuquis   • Hyperlipidemia    • Hypertension    • Psychiatric problem    • Sleep apnea     cpap   • Thyroid disease     Hasimotos         Past Surgical History:   Procedure Laterality Date   • CATARACT EXTRACTION WITH IOL     • DENTAL SURGERY     • HERNIA REPAIR     • OTHER ORTHOPEDIC SURGERY      thumb repair   • TONSILLECTOMY           Current Outpatient Medications   Medication Sig Dispense Refill   • DILTIAZem CD (CARDIZEM CD) 120 MG CAPSULE SR 24 HR Take 1 Capsule by mouth every day. 90 Capsule 3   • terazosin (HYTRIN) 2 MG Cap TAKE 1 CAPSULE BY MOUTH EVERY 24 HOURS 90 Capsule 0   • vitamin D (CHOLECALCIFEROL) 1000 Unit (25 mcg) Tab Take 1,000 Units by mouth every 48 hours.     • FOLIC ACID PO Take 1 Tablet by mouth every evening. Liquid*     • FLUoxetine (PROZAC) 20 MG Cap Take 20 mg by mouth every morning.     • losartan (COZAAR) 50 MG Tab Take  mg by mouth 2 times a day. 50 mg in am and 100 mg in pm     • apixaban (ELIQUIS) 5mg Tab Take 1 tablet by mouth 2 times a day. 60 tablet 11   • finasteride (PROSCAR) 5 MG Tab Take 5 mg by  "mouth every evening.     • Multiple Vitamins-Minerals (ICAPS AREDS 2 PO) Take 1 Capsule by mouth every evening.     • Multiple Vitamin (MULTI-VITAMIN DAILY PO) Take 1 Tablet by mouth every evening.     • atorvastatin (LIPITOR) 20 MG Tab TAKE 1 TABLET BY MOUTH EVERY DAY (Patient taking differently: Take 20 mg by mouth every evening.) 90 tablet 2   • levothyroxine (SYNTHROID) 100 MCG Tab TAKE 1 TABLET BY MOUTH EVERY 24 HOURS (Patient taking differently: Take 100 mcg by mouth every morning on an empty stomach.) 90 tablet 2   • levothyroxine (SYNTHROID) 112 MCG Tab Take 112 mcg by mouth every day.       No current facility-administered medications for this visit.         Allergies   Allergen Reactions   • Penicillins Rash     rash   • Oxycodone-Acetaminophen Unspecified     Hallucinations per pt.    • Penicillin G Rash and Unspecified   • Latex      Allergy test         Family History   Problem Relation Age of Onset   • Cancer Mother         breast cancer   • Thyroid Sister         Hasimotos   • Diabetes Sister         Type 2 DM   • Thyroid Brother         Hasimotos   • Diabetes Brother         Type 2 DM   • No Known Problems Maternal Grandmother    • Heart Attack Maternal Grandfather              Physical Exam:  Ambulatory Vitals  /78 (BP Location: Left arm, Patient Position: Sitting, BP Cuff Size: Adult)   Pulse (!) 59   Resp 14   Ht 1.753 m (5' 9\")   Wt 98.9 kg (218 lb)   SpO2 96%    Oxygen Therapy:  Pulse Oximetry: 96 %  BP Readings from Last 4 Encounters:   12/17/21 136/78   09/08/21 140/70   08/13/21 145/75   07/07/21 120/78       Weight/BMI: Body mass index is 32.19 kg/m².  Wt Readings from Last 4 Encounters:   12/17/21 98.9 kg (218 lb)   09/28/21 97.1 kg (214 lb)   09/08/21 97.1 kg (214 lb)   08/13/21 96.7 kg (213 lb 3 oz)     General: Well appearing and in no apparent distress  Head: atrumatic  Eyes: No conjunctival pallor   ENT: normal external appearance of nose and ears  Neck: JVD absent, carotid " bruits absent  Lungs: respiratory sounds  normal, additional breath sounds absent  Heart: Regular rhythm,   No palpable thrills on palpation, murmurs absent, no rubs,   Lower extremity edema absent.       Lab Data Review:  Lab Results   Component Value Date/Time    CHOLSTRLTOT 143 02/07/2020 10:20 AM    LDL 66 02/07/2020 10:20 AM    HDL 66 02/07/2020 10:20 AM    TRIGLYCERIDE 56 02/07/2020 10:20 AM       Lab Results   Component Value Date/Time    SODIUM 141 08/13/2021 07:10 AM    POTASSIUM 4.4 08/13/2021 07:10 AM    CHLORIDE 106 08/13/2021 07:10 AM    CO2 27 08/13/2021 07:10 AM    GLUCOSE 86 08/13/2021 07:10 AM    BUN 20 08/13/2021 07:10 AM    CREATININE 1.24 08/13/2021 07:10 AM     Lab Results   Component Value Date/Time    ALKPHOSPHAT 80 08/13/2021 07:10 AM    ASTSGOT 14 08/13/2021 07:10 AM    ALTSGPT 20 08/13/2021 07:10 AM    TBILIRUBIN 0.5 08/13/2021 07:10 AM      Lab Results   Component Value Date/Time    WBC 5.7 08/13/2021 07:10 AM     Prior cardiology notes reviewed.  Stress test from 2019 reviewed shows negative for ischemia.  Echocardiogram from July reviewed, personally interpreted shows normal LV, RV function, no significant valvular abnormalities.    Impression and Plan:  81-year-old male patient with hypertension, hyperlipidemia, atrial fibrillation status post ablation, atypical chest pains.    -Recommend repeating stress test, he started feeling chest pains recently.  -Continue to stay off Multaq, his rhythm is regular on exam today.  -I will switch his metoprolol to diltiazem due to fatigue.  Advised to cut down losartan to 50 mg twice daily to have room for diltiazem.  Labs reviewed from Dr. Awan's office, all satisfactory creatinine is slightly elevated at 1.2.  Continue Lipitor for hyperlipidemia.  Continue Eliquis for anticoagulation.      Return in about 6 months (around 6/17/2022).      Giancarlo ENGEL  Interventional cardiologist  [facility], [place]  [address] [address]  Phone: [phone]  Fax: [phone]

## 2022-01-03 ENCOUNTER — HOSPITAL ENCOUNTER (OUTPATIENT)
Dept: RADIOLOGY | Facility: MEDICAL CENTER | Age: 82
End: 2022-01-03
Attending: INTERNAL MEDICINE
Payer: MEDICARE

## 2022-01-03 DIAGNOSIS — R07.89 OTHER CHEST PAIN: ICD-10-CM

## 2022-01-03 PROCEDURE — A9502 TC99M TETROFOSMIN: HCPCS

## 2022-01-03 PROCEDURE — 700111 HCHG RX REV CODE 636 W/ 250 OVERRIDE (IP)

## 2022-01-03 PROCEDURE — 78452 HT MUSCLE IMAGE SPECT MULT: CPT | Mod: 26 | Performed by: INTERNAL MEDICINE

## 2022-01-03 PROCEDURE — 93018 CV STRESS TEST I&R ONLY: CPT | Performed by: INTERNAL MEDICINE

## 2022-01-03 RX ORDER — REGADENOSON 0.08 MG/ML
INJECTION, SOLUTION INTRAVENOUS
Status: COMPLETED
Start: 2022-01-03 | End: 2022-01-03

## 2022-01-03 RX ADMIN — REGADENOSON 0.4 MG: 0.08 INJECTION, SOLUTION INTRAVENOUS at 09:45

## 2022-05-10 ENCOUNTER — OFFICE VISIT (OUTPATIENT)
Dept: CARDIOLOGY | Facility: MEDICAL CENTER | Age: 82
End: 2022-05-10
Payer: MEDICARE

## 2022-05-10 VITALS
WEIGHT: 221.5 LBS | BODY MASS INDEX: 32.81 KG/M2 | SYSTOLIC BLOOD PRESSURE: 132 MMHG | HEART RATE: 80 BPM | DIASTOLIC BLOOD PRESSURE: 70 MMHG | OXYGEN SATURATION: 94 % | RESPIRATION RATE: 14 BRPM | HEIGHT: 69 IN

## 2022-05-10 DIAGNOSIS — I48.0 PAROXYSMAL ATRIAL FIBRILLATION (HCC): ICD-10-CM

## 2022-05-10 PROCEDURE — 99214 OFFICE O/P EST MOD 30 MIN: CPT | Performed by: INTERNAL MEDICINE

## 2022-05-10 ASSESSMENT — FIBROSIS 4 INDEX: FIB4 SCORE: 1.341640786499873818

## 2022-05-10 NOTE — PROGRESS NOTES
Cardiology Follow-up Consultation Note    Primary Care Provider: Trina Awan M.D.    Name:             Donny Haas   YOB: 1940  MRN:               [id number]    CC: Follow-up atrial fibrillation, hypertension, dyslipidemia    Patient ID/HPI:   82-year-old male patient established in our office with history of atrial fibrillation s/p ablation of A. fib, flutter line, hypertension, dyslipidemia here for follow-up.  Since last evaluated by me his fatigue is improved.  He feels well.  Chest pains are improved.  No particular complaints today.  Still has somewhat low energy.    Past Medical History:   Diagnosis Date   • Arrhythmia     a-fib   • Cataract     bilat sugery   • Hemorrhagic disorder (HCC)     eliuquis   • Hyperlipidemia    • Hypertension    • Psychiatric problem    • Sleep apnea     cpap   • Thyroid disease     Hasimotos         Past Surgical History:   Procedure Laterality Date   • CATARACT EXTRACTION WITH IOL     • DENTAL SURGERY     • HERNIA REPAIR     • OTHER ORTHOPEDIC SURGERY      thumb repair   • TONSILLECTOMY           Current Outpatient Medications   Medication Sig Dispense Refill   • Cyanocobalamin (VITAMIN B-12 PO) Take  by mouth.     • DILTIAZem CD (CARDIZEM CD) 120 MG CAPSULE SR 24 HR Take 1 Capsule by mouth every day. 90 Capsule 3   • terazosin (HYTRIN) 2 MG Cap TAKE 1 CAPSULE BY MOUTH EVERY 24 HOURS (Patient taking differently: Take 5 mg by mouth every day.) 90 Capsule 0   • vitamin D (CHOLECALCIFEROL) 1000 Unit (25 mcg) Tab Take  by mouth every 7 days.     • FOLIC ACID PO Take 1 Tablet by mouth every evening. Liquid*     • FLUoxetine (PROZAC) 20 MG Cap Take 20 mg by mouth every morning.     • losartan (COZAAR) 50 MG Tab Take 100 mg by mouth 2 times a day. 50 mg in am and 100 mg in pm     • apixaban (ELIQUIS) 5mg Tab Take 1 tablet by mouth 2 times a day. 60 tablet 11   • finasteride (PROSCAR) 5 MG Tab Take 5 mg by mouth every evening.     • Multiple  "Vitamins-Minerals (ICAPS AREDS 2 PO) Take 1 Capsule by mouth every evening.     • atorvastatin (LIPITOR) 20 MG Tab TAKE 1 TABLET BY MOUTH EVERY DAY (Patient taking differently: Take 20 mg by mouth every evening.) 90 tablet 2   • levothyroxine (SYNTHROID) 112 MCG Tab Take 112 mcg by mouth every day. (Patient not taking: Reported on 5/10/2022)       No current facility-administered medications for this visit.         Allergies   Allergen Reactions   • Penicillins Rash     rash   • Oxycodone-Acetaminophen Unspecified     Hallucinations per pt.    • Penicillin G Rash and Unspecified   • Latex      Allergy test         Family History   Problem Relation Age of Onset   • Cancer Mother         breast cancer   • Thyroid Sister         Hasimotos   • Diabetes Sister         Type 2 DM   • Thyroid Brother         Hasimotos   • Diabetes Brother         Type 2 DM   • No Known Problems Maternal Grandmother    • Heart Attack Maternal Grandfather              Physical Exam:  Ambulatory Vitals  /70 (BP Location: Left arm, Patient Position: Sitting, BP Cuff Size: Adult)   Pulse 80   Resp 14   Ht 1.753 m (5' 9\")   Wt 100 kg (221 lb 8 oz)   SpO2 94%    Oxygen Therapy:  Pulse Oximetry: 94 %  BP Readings from Last 4 Encounters:   05/10/22 132/70   12/17/21 136/78   09/08/21 140/70   08/13/21 145/75       Weight/BMI: Body mass index is 32.71 kg/m².  Wt Readings from Last 4 Encounters:   05/10/22 100 kg (221 lb 8 oz)   12/17/21 98.9 kg (218 lb)   09/28/21 97.1 kg (214 lb)   09/08/21 97.1 kg (214 lb)     General: Well appearing and in no apparent distress  Head: atrumatic  Eyes: No conjunctival pallor   ENT: normal external appearance of nose and ears  Neck: JVD absent, carotid bruits absent  Lungs: respiratory sounds  normal, additional breath sounds absent  Heart: Regular rhythm,   No palpable thrills on palpation, murmurs absent, no rubs,   Lower extremity edema absent.       Lab Data Review:  Lab Results   Component Value " Date/Time    CHOLSTRLTOT 143 02/07/2020 10:20 AM    LDL 66 02/07/2020 10:20 AM    HDL 66 02/07/2020 10:20 AM    TRIGLYCERIDE 56 02/07/2020 10:20 AM       Lab Results   Component Value Date/Time    SODIUM 141 08/13/2021 07:10 AM    POTASSIUM 4.4 08/13/2021 07:10 AM    CHLORIDE 106 08/13/2021 07:10 AM    CO2 27 08/13/2021 07:10 AM    GLUCOSE 86 08/13/2021 07:10 AM    BUN 20 08/13/2021 07:10 AM    CREATININE 1.24 08/13/2021 07:10 AM     Lab Results   Component Value Date/Time    ALKPHOSPHAT 80 08/13/2021 07:10 AM    ASTSGOT 14 08/13/2021 07:10 AM    ALTSGPT 20 08/13/2021 07:10 AM    TBILIRUBIN 0.5 08/13/2021 07:10 AM      Lab Results   Component Value Date/Time    WBC 5.7 08/13/2021 07:10 AM     Prior cardiology notes reviewed.  Stress test from 2019 reviewed shows negative for ischemia.  Echocardiogram from July reviewed, personally interpreted shows normal LV, RV function, no significant valvular abnormalities.    Impression and Plan:  82-year-old male patient with hypertension, hyperlipidemia, atrial fibrillation status post ablation, atypical chest pains.    Stress test reviewed with the patient, no evidence of ischemia.  Continues to be in sinus rhythm.  Continue current therapy with Eliquis, Lipitor, diltiazem, losartan.  Blood pressure well controlled.        Return in about 1 year (around 5/10/2023).      Giancarlo ENGEL  Interventional cardiologist  [facility], [address]  [address] [address]  Phone: [phone]  Fax: [phone]

## 2022-05-18 DIAGNOSIS — Z79.899 HIGH RISK MEDICATION USE: ICD-10-CM

## 2022-05-18 DIAGNOSIS — I48.0 PAROXYSMAL ATRIAL FIBRILLATION (HCC): ICD-10-CM

## 2022-05-18 RX ORDER — APIXABAN 5 MG/1
TABLET, FILM COATED ORAL
Qty: 180 TABLET | Refills: 3 | Status: SHIPPED | OUTPATIENT
Start: 2022-05-18 | End: 2023-05-24

## 2022-07-01 ENCOUNTER — HOSPITAL ENCOUNTER (OUTPATIENT)
Dept: LAB | Facility: MEDICAL CENTER | Age: 82
End: 2022-07-01
Attending: INTERNAL MEDICINE
Payer: MEDICARE

## 2022-07-01 LAB
ALBUMIN SERPL BCP-MCNC: 4.4 G/DL (ref 3.2–4.9)
ALBUMIN/GLOB SERPL: 1.7 G/DL
ALP SERPL-CCNC: 111 U/L (ref 30–99)
ALT SERPL-CCNC: 14 U/L (ref 2–50)
ANION GAP SERPL CALC-SCNC: 12 MMOL/L (ref 7–16)
APPEARANCE UR: CLEAR
AST SERPL-CCNC: 16 U/L (ref 12–45)
BACTERIA #/AREA URNS HPF: NEGATIVE /HPF
BILIRUB SERPL-MCNC: 0.5 MG/DL (ref 0.1–1.5)
BILIRUB UR QL STRIP.AUTO: NEGATIVE
BUN SERPL-MCNC: 20 MG/DL (ref 8–22)
CALCIUM SERPL-MCNC: 9.4 MG/DL (ref 8.5–10.5)
CHLORIDE SERPL-SCNC: 101 MMOL/L (ref 96–112)
CO2 SERPL-SCNC: 24 MMOL/L (ref 20–33)
COLOR UR: YELLOW
CREAT SERPL-MCNC: 1.21 MG/DL (ref 0.5–1.4)
EPI CELLS #/AREA URNS HPF: NEGATIVE /HPF
FASTING STATUS PATIENT QL REPORTED: NORMAL
GFR SERPLBLD CREATININE-BSD FMLA CKD-EPI: 60 ML/MIN/1.73 M 2
GLOBULIN SER CALC-MCNC: 2.6 G/DL (ref 1.9–3.5)
GLUCOSE SERPL-MCNC: 87 MG/DL (ref 65–99)
GLUCOSE UR STRIP.AUTO-MCNC: NEGATIVE MG/DL
HYALINE CASTS #/AREA URNS LPF: ABNORMAL /LPF
KETONES UR STRIP.AUTO-MCNC: NEGATIVE MG/DL
LEUKOCYTE ESTERASE UR QL STRIP.AUTO: NEGATIVE
MICRO URNS: ABNORMAL
NITRITE UR QL STRIP.AUTO: NEGATIVE
PH UR STRIP.AUTO: 6.5 [PH] (ref 5–8)
POTASSIUM SERPL-SCNC: 4.5 MMOL/L (ref 3.6–5.5)
PROT SERPL-MCNC: 7 G/DL (ref 6–8.2)
PROT UR QL STRIP: 100 MG/DL
RBC # URNS HPF: ABNORMAL /HPF
RBC UR QL AUTO: NEGATIVE
SODIUM SERPL-SCNC: 137 MMOL/L (ref 135–145)
SP GR UR STRIP.AUTO: 1.01
T4 FREE SERPL-MCNC: 1.47 NG/DL (ref 0.93–1.7)
TSH SERPL DL<=0.005 MIU/L-ACNC: 0.71 UIU/ML (ref 0.38–5.33)
UROBILINOGEN UR STRIP.AUTO-MCNC: 0.2 MG/DL
WBC #/AREA URNS HPF: ABNORMAL /HPF

## 2022-07-01 PROCEDURE — 81001 URINALYSIS AUTO W/SCOPE: CPT

## 2022-07-01 PROCEDURE — 84439 ASSAY OF FREE THYROXINE: CPT

## 2022-07-01 PROCEDURE — 82043 UR ALBUMIN QUANTITATIVE: CPT

## 2022-07-01 PROCEDURE — 82570 ASSAY OF URINE CREATININE: CPT

## 2022-07-01 PROCEDURE — 84443 ASSAY THYROID STIM HORMONE: CPT

## 2022-07-01 PROCEDURE — 80053 COMPREHEN METABOLIC PANEL: CPT

## 2022-07-01 PROCEDURE — 36415 COLL VENOUS BLD VENIPUNCTURE: CPT

## 2022-07-02 LAB
CREAT UR-MCNC: 91.97 MG/DL
MICROALBUMIN UR-MCNC: 38.1 MG/DL
MICROALBUMIN/CREAT UR: 414 MG/G (ref 0–30)

## 2022-08-31 ENCOUNTER — HOSPITAL ENCOUNTER (OUTPATIENT)
Dept: LAB | Facility: MEDICAL CENTER | Age: 82
End: 2022-08-31
Attending: INTERNAL MEDICINE
Payer: MEDICARE

## 2022-08-31 LAB
BASOPHILS # BLD AUTO: 0.7 % (ref 0–1.8)
BASOPHILS # BLD: 0.03 K/UL (ref 0–0.12)
EOSINOPHIL # BLD AUTO: 0.03 K/UL (ref 0–0.51)
EOSINOPHIL NFR BLD: 0.7 % (ref 0–6.9)
ERYTHROCYTE [DISTWIDTH] IN BLOOD BY AUTOMATED COUNT: 52.6 FL (ref 35.9–50)
HCT VFR BLD AUTO: 42.2 % (ref 42–52)
HCYS SERPL-SCNC: 11.82 UMOL/L
HGB BLD-MCNC: 14.2 G/DL (ref 14–18)
IMM GRANULOCYTES # BLD AUTO: 0.03 K/UL (ref 0–0.11)
IMM GRANULOCYTES NFR BLD AUTO: 0.7 % (ref 0–0.9)
LYMPHOCYTES # BLD AUTO: 0.94 K/UL (ref 1–4.8)
LYMPHOCYTES NFR BLD: 22 % (ref 22–41)
MCH RBC QN AUTO: 32.3 PG (ref 27–33)
MCHC RBC AUTO-ENTMCNC: 33.6 G/DL (ref 33.7–35.3)
MCV RBC AUTO: 95.9 FL (ref 81.4–97.8)
MONOCYTES # BLD AUTO: 0.48 K/UL (ref 0–0.85)
MONOCYTES NFR BLD AUTO: 11.2 % (ref 0–13.4)
NEUTROPHILS # BLD AUTO: 2.77 K/UL (ref 1.82–7.42)
NEUTROPHILS NFR BLD: 64.7 % (ref 44–72)
NRBC # BLD AUTO: 0 K/UL
NRBC BLD-RTO: 0 /100 WBC
PLATELET # BLD AUTO: 226 K/UL (ref 164–446)
PMV BLD AUTO: 9.8 FL (ref 9–12.9)
RBC # BLD AUTO: 4.4 M/UL (ref 4.7–6.1)
WBC # BLD AUTO: 4.3 K/UL (ref 4.8–10.8)

## 2022-08-31 PROCEDURE — 83921 ORGANIC ACID SINGLE QUANT: CPT

## 2022-08-31 PROCEDURE — 84439 ASSAY OF FREE THYROXINE: CPT

## 2022-08-31 PROCEDURE — 82306 VITAMIN D 25 HYDROXY: CPT

## 2022-08-31 PROCEDURE — 36415 COLL VENOUS BLD VENIPUNCTURE: CPT

## 2022-08-31 PROCEDURE — 84443 ASSAY THYROID STIM HORMONE: CPT

## 2022-08-31 PROCEDURE — 85025 COMPLETE CBC W/AUTO DIFF WBC: CPT

## 2022-08-31 PROCEDURE — 83090 ASSAY OF HOMOCYSTEINE: CPT | Mod: GA

## 2022-09-01 LAB
25(OH)D3 SERPL-MCNC: 67 NG/ML (ref 30–100)
T4 FREE SERPL-MCNC: 1.22 NG/DL (ref 0.93–1.7)
TSH SERPL DL<=0.005 MIU/L-ACNC: 2.93 UIU/ML (ref 0.38–5.33)

## 2022-09-05 LAB — METHYLMALONATE SERPL-SCNC: 0.17 UMOL/L (ref 0–0.4)

## 2022-10-26 ENCOUNTER — HOSPITAL ENCOUNTER (OUTPATIENT)
Dept: LAB | Facility: MEDICAL CENTER | Age: 82
End: 2022-10-26
Attending: UROLOGY
Payer: MEDICARE

## 2022-10-26 LAB — PSA SERPL-MCNC: 2.87 NG/ML (ref 0–4)

## 2022-10-26 PROCEDURE — 84153 ASSAY OF PSA TOTAL: CPT

## 2022-10-26 PROCEDURE — 36415 COLL VENOUS BLD VENIPUNCTURE: CPT

## 2022-11-08 ENCOUNTER — PATIENT MESSAGE (OUTPATIENT)
Dept: HEALTH INFORMATION MANAGEMENT | Facility: OTHER | Age: 82
End: 2022-11-08

## 2022-11-29 ENCOUNTER — HOSPITAL ENCOUNTER (INPATIENT)
Facility: MEDICAL CENTER | Age: 82
LOS: 1 days | DRG: 206 | End: 2022-11-30
Attending: EMERGENCY MEDICINE | Admitting: INTERNAL MEDICINE
Payer: MEDICARE

## 2022-11-29 ENCOUNTER — APPOINTMENT (OUTPATIENT)
Dept: RADIOLOGY | Facility: MEDICAL CENTER | Age: 82
DRG: 206 | End: 2022-11-29
Attending: EMERGENCY MEDICINE
Payer: MEDICARE

## 2022-11-29 DIAGNOSIS — J06.9 UPPER RESPIRATORY TRACT INFECTION, UNSPECIFIED TYPE: ICD-10-CM

## 2022-11-29 DIAGNOSIS — J96.01 ACUTE RESPIRATORY FAILURE WITH HYPOXIA (HCC): ICD-10-CM

## 2022-11-29 DIAGNOSIS — N17.9 AKI (ACUTE KIDNEY INJURY) (HCC): ICD-10-CM

## 2022-11-29 PROBLEM — J32.9 SINUSITIS: Status: ACTIVE | Noted: 2022-11-29

## 2022-11-29 PROBLEM — R06.09 DYSPNEA ON EXERTION: Status: ACTIVE | Noted: 2022-11-29

## 2022-11-29 PROBLEM — R79.89 TROPONIN LEVEL ELEVATED: Status: ACTIVE | Noted: 2022-11-29

## 2022-11-29 PROBLEM — R09.02 HYPOXIA: Status: ACTIVE | Noted: 2022-11-29

## 2022-11-29 LAB
ALBUMIN SERPL BCP-MCNC: 4 G/DL (ref 3.2–4.9)
ALBUMIN/GLOB SERPL: 1.3 G/DL
ALP SERPL-CCNC: 110 U/L (ref 30–99)
ALT SERPL-CCNC: 22 U/L (ref 2–50)
ANION GAP SERPL CALC-SCNC: 13 MMOL/L (ref 7–16)
AST SERPL-CCNC: 24 U/L (ref 12–45)
BASOPHILS # BLD AUTO: 0.3 % (ref 0–1.8)
BASOPHILS # BLD: 0.02 K/UL (ref 0–0.12)
BILIRUB SERPL-MCNC: 0.7 MG/DL (ref 0.1–1.5)
BUN SERPL-MCNC: 18 MG/DL (ref 8–22)
CALCIUM SERPL-MCNC: 8.9 MG/DL (ref 8.4–10.2)
CHLORIDE SERPL-SCNC: 98 MMOL/L (ref 96–112)
CO2 SERPL-SCNC: 25 MMOL/L (ref 20–33)
CREAT SERPL-MCNC: 1.65 MG/DL (ref 0.5–1.4)
D DIMER PPP IA.FEU-MCNC: 0.51 UG/ML (FEU) (ref 0–0.5)
EKG IMPRESSION: NORMAL
EOSINOPHIL # BLD AUTO: 0.06 K/UL (ref 0–0.51)
EOSINOPHIL NFR BLD: 0.8 % (ref 0–6.9)
ERYTHROCYTE [DISTWIDTH] IN BLOOD BY AUTOMATED COUNT: 47.2 FL (ref 35.9–50)
FLUAV RNA SPEC QL NAA+PROBE: NEGATIVE
FLUBV RNA SPEC QL NAA+PROBE: NEGATIVE
GFR SERPLBLD CREATININE-BSD FMLA CKD-EPI: 41 ML/MIN/1.73 M 2
GLOBULIN SER CALC-MCNC: 3.1 G/DL (ref 1.9–3.5)
GLUCOSE SERPL-MCNC: 101 MG/DL (ref 65–99)
HCT VFR BLD AUTO: 39.2 % (ref 42–52)
HGB BLD-MCNC: 13.2 G/DL (ref 14–18)
IMM GRANULOCYTES # BLD AUTO: 0.03 K/UL (ref 0–0.11)
IMM GRANULOCYTES NFR BLD AUTO: 0.4 % (ref 0–0.9)
LACTATE SERPL-SCNC: 1.2 MMOL/L (ref 0.5–2)
LYMPHOCYTES # BLD AUTO: 1.12 K/UL (ref 1–4.8)
LYMPHOCYTES NFR BLD: 15.7 % (ref 22–41)
MCH RBC QN AUTO: 32.7 PG (ref 27–33)
MCHC RBC AUTO-ENTMCNC: 33.7 G/DL (ref 33.7–35.3)
MCV RBC AUTO: 97 FL (ref 81.4–97.8)
MONOCYTES # BLD AUTO: 1.25 K/UL (ref 0–0.85)
MONOCYTES NFR BLD AUTO: 17.5 % (ref 0–13.4)
NEUTROPHILS # BLD AUTO: 4.67 K/UL (ref 1.82–7.42)
NEUTROPHILS NFR BLD: 65.3 % (ref 44–72)
NRBC # BLD AUTO: 0 K/UL
NRBC BLD-RTO: 0 /100 WBC
NT-PROBNP SERPL IA-MCNC: 724 PG/ML (ref 0–125)
PLATELET # BLD AUTO: 234 K/UL (ref 164–446)
PMV BLD AUTO: 10.1 FL (ref 9–12.9)
POTASSIUM SERPL-SCNC: 3.8 MMOL/L (ref 3.6–5.5)
PROT SERPL-MCNC: 7.1 G/DL (ref 6–8.2)
RBC # BLD AUTO: 4.04 M/UL (ref 4.7–6.1)
RSV RNA SPEC QL NAA+PROBE: NEGATIVE
SARS-COV-2 RNA RESP QL NAA+PROBE: NOTDETECTED
SODIUM SERPL-SCNC: 136 MMOL/L (ref 135–145)
SPECIMEN SOURCE: NORMAL
TROPONIN T SERPL-MCNC: 38 NG/L (ref 6–19)
TROPONIN T SERPL-MCNC: 45 NG/L (ref 6–19)
WBC # BLD AUTO: 7.2 K/UL (ref 4.8–10.8)

## 2022-11-29 PROCEDURE — 83880 ASSAY OF NATRIURETIC PEPTIDE: CPT

## 2022-11-29 PROCEDURE — 36415 COLL VENOUS BLD VENIPUNCTURE: CPT

## 2022-11-29 PROCEDURE — 700102 HCHG RX REV CODE 250 W/ 637 OVERRIDE(OP): Performed by: INTERNAL MEDICINE

## 2022-11-29 PROCEDURE — 99223 1ST HOSP IP/OBS HIGH 75: CPT | Mod: AI | Performed by: INTERNAL MEDICINE

## 2022-11-29 PROCEDURE — 87040 BLOOD CULTURE FOR BACTERIA: CPT

## 2022-11-29 PROCEDURE — A9270 NON-COVERED ITEM OR SERVICE: HCPCS | Performed by: INTERNAL MEDICINE

## 2022-11-29 PROCEDURE — 0241U HCHG SARS-COV-2 COVID-19 NFCT DS RESP RNA 4 TRGT MIC: CPT

## 2022-11-29 PROCEDURE — 93005 ELECTROCARDIOGRAM TRACING: CPT | Performed by: EMERGENCY MEDICINE

## 2022-11-29 PROCEDURE — 71045 X-RAY EXAM CHEST 1 VIEW: CPT

## 2022-11-29 PROCEDURE — C9803 HOPD COVID-19 SPEC COLLECT: HCPCS | Performed by: EMERGENCY MEDICINE

## 2022-11-29 PROCEDURE — 93005 ELECTROCARDIOGRAM TRACING: CPT

## 2022-11-29 PROCEDURE — 80053 COMPREHEN METABOLIC PANEL: CPT

## 2022-11-29 PROCEDURE — 99285 EMERGENCY DEPT VISIT HI MDM: CPT

## 2022-11-29 PROCEDURE — 83605 ASSAY OF LACTIC ACID: CPT

## 2022-11-29 PROCEDURE — 85379 FIBRIN DEGRADATION QUANT: CPT

## 2022-11-29 PROCEDURE — 770020 HCHG ROOM/CARE - TELE (206)

## 2022-11-29 PROCEDURE — 84484 ASSAY OF TROPONIN QUANT: CPT

## 2022-11-29 PROCEDURE — 85025 COMPLETE CBC W/AUTO DIFF WBC: CPT

## 2022-11-29 RX ORDER — LEVOTHYROXINE SODIUM 112 UG/1
112 TABLET ORAL DAILY
Status: DISCONTINUED | OUTPATIENT
Start: 2022-11-30 | End: 2022-11-30 | Stop reason: HOSPADM

## 2022-11-29 RX ORDER — POLYETHYLENE GLYCOL 3350 17 G/17G
1 POWDER, FOR SOLUTION ORAL
Status: DISCONTINUED | OUTPATIENT
Start: 2022-11-29 | End: 2022-11-30 | Stop reason: HOSPADM

## 2022-11-29 RX ORDER — PSEUDOEPHEDRINE HCL 30 MG
30 TABLET ORAL EVERY 6 HOURS PRN
Status: DISCONTINUED | OUTPATIENT
Start: 2022-11-29 | End: 2022-11-30 | Stop reason: HOSPADM

## 2022-11-29 RX ORDER — BISACODYL 10 MG
10 SUPPOSITORY, RECTAL RECTAL
Status: DISCONTINUED | OUTPATIENT
Start: 2022-11-29 | End: 2022-11-30 | Stop reason: HOSPADM

## 2022-11-29 RX ORDER — AMOXICILLIN 250 MG
2 CAPSULE ORAL 2 TIMES DAILY
Status: DISCONTINUED | OUTPATIENT
Start: 2022-11-29 | End: 2022-11-30 | Stop reason: HOSPADM

## 2022-11-29 RX ORDER — DOXYCYCLINE 100 MG/1
100 TABLET ORAL EVERY 12 HOURS
Status: DISCONTINUED | OUTPATIENT
Start: 2022-11-29 | End: 2022-11-30 | Stop reason: HOSPADM

## 2022-11-29 RX ORDER — AMOXICILLIN AND CLAVULANATE POTASSIUM 875; 125 MG/1; MG/1
1 TABLET, FILM COATED ORAL EVERY 12 HOURS
Status: DISCONTINUED | OUTPATIENT
Start: 2022-11-29 | End: 2022-11-29

## 2022-11-29 RX ORDER — TERAZOSIN 5 MG/1
5 CAPSULE ORAL NIGHTLY
COMMUNITY

## 2022-11-29 RX ORDER — LABETALOL HYDROCHLORIDE 5 MG/ML
10 INJECTION, SOLUTION INTRAVENOUS EVERY 4 HOURS PRN
Status: DISCONTINUED | OUTPATIENT
Start: 2022-11-29 | End: 2022-11-30 | Stop reason: HOSPADM

## 2022-11-29 RX ORDER — TERAZOSIN 5 MG/1
5 CAPSULE ORAL NIGHTLY
Status: DISCONTINUED | OUTPATIENT
Start: 2022-11-29 | End: 2022-11-30 | Stop reason: HOSPADM

## 2022-11-29 RX ORDER — FLUOXETINE HYDROCHLORIDE 20 MG/1
20 CAPSULE ORAL EVERY MORNING
Status: DISCONTINUED | OUTPATIENT
Start: 2022-11-30 | End: 2022-11-30 | Stop reason: HOSPADM

## 2022-11-29 RX ORDER — LOSARTAN POTASSIUM 25 MG/1
50 TABLET ORAL 2 TIMES DAILY
Status: DISCONTINUED | OUTPATIENT
Start: 2022-11-29 | End: 2022-11-30 | Stop reason: HOSPADM

## 2022-11-29 RX ORDER — ATORVASTATIN CALCIUM 20 MG/1
20 TABLET, FILM COATED ORAL EVERY EVENING
Status: DISCONTINUED | OUTPATIENT
Start: 2022-11-29 | End: 2022-11-30 | Stop reason: HOSPADM

## 2022-11-29 RX ORDER — DILTIAZEM HYDROCHLORIDE 120 MG/1
120 CAPSULE, COATED, EXTENDED RELEASE ORAL EVERY EVENING
Status: DISCONTINUED | OUTPATIENT
Start: 2022-11-29 | End: 2022-11-30 | Stop reason: HOSPADM

## 2022-11-29 RX ORDER — FINASTERIDE 5 MG/1
5 TABLET, FILM COATED ORAL EVERY EVENING
Status: DISCONTINUED | OUTPATIENT
Start: 2022-11-29 | End: 2022-11-30 | Stop reason: HOSPADM

## 2022-11-29 RX ADMIN — ATORVASTATIN CALCIUM 20 MG: 20 TABLET, FILM COATED ORAL at 20:05

## 2022-11-29 RX ADMIN — FINASTERIDE 5 MG: 5 TABLET, FILM COATED ORAL at 20:05

## 2022-11-29 RX ADMIN — APIXABAN 5 MG: 5 TABLET, FILM COATED ORAL at 20:04

## 2022-11-29 RX ADMIN — TERAZOSIN HYDROCHLORIDE 5 MG: 1 CAPSULE ORAL at 20:05

## 2022-11-29 RX ADMIN — LOSARTAN POTASSIUM 50 MG: 25 TABLET, FILM COATED ORAL at 20:05

## 2022-11-29 RX ADMIN — DOXYCYCLINE 100 MG: 100 TABLET, FILM COATED ORAL at 20:05

## 2022-11-29 RX ADMIN — DILTIAZEM HYDROCHLORIDE 120 MG: 120 CAPSULE, COATED, EXTENDED RELEASE ORAL at 20:05

## 2022-11-29 RX ADMIN — PSEUDOEPHEDRINE HCL 30 MG: 30 TABLET, FILM COATED ORAL at 20:04

## 2022-11-29 ASSESSMENT — COGNITIVE AND FUNCTIONAL STATUS - GENERAL
SUGGESTED CMS G CODE MODIFIER DAILY ACTIVITY: CH
DAILY ACTIVITIY SCORE: 24
MOBILITY SCORE: 24
SUGGESTED CMS G CODE MODIFIER MOBILITY: CH

## 2022-11-29 ASSESSMENT — ENCOUNTER SYMPTOMS
WEAKNESS: 0
SORE THROAT: 1
COUGH: 1
ABDOMINAL PAIN: 0
SPEECH CHANGE: 0
CHILLS: 0
HEARTBURN: 0
CONSTIPATION: 0
WHEEZING: 0
SHORTNESS OF BREATH: 1
BLURRED VISION: 0
SPUTUM PRODUCTION: 1
FEVER: 0
DIARRHEA: 0
CLAUDICATION: 0
DIZZINESS: 0
INSOMNIA: 0
VOMITING: 0
NERVOUS/ANXIOUS: 0
HEADACHES: 0
SENSORY CHANGE: 0
MYALGIAS: 0
PHOTOPHOBIA: 0
DEPRESSION: 0

## 2022-11-29 ASSESSMENT — COPD QUESTIONNAIRES
COPD SCREENING SCORE: 4
HAVE YOU SMOKED AT LEAST 100 CIGARETTES IN YOUR ENTIRE LIFE: YES
DURING THE PAST 4 WEEKS HOW MUCH DID YOU FEEL SHORT OF BREATH: NONE/LITTLE OF THE TIME
DO YOU EVER COUGH UP ANY MUCUS OR PHLEGM?: NO/ONLY WITH OCCASIONAL COLDS OR INFECTIONS

## 2022-11-29 ASSESSMENT — FIBROSIS 4 INDEX: FIB4 SCORE: 1.53

## 2022-11-29 ASSESSMENT — LIFESTYLE VARIABLES
TOTAL SCORE: 0
EVER FELT BAD OR GUILTY ABOUT YOUR DRINKING: NO
ALCOHOL_USE: YES
HOW MANY TIMES IN THE PAST YEAR HAVE YOU HAD 5 OR MORE DRINKS IN A DAY: 0
TOTAL SCORE: 0
TOTAL SCORE: 0
AVERAGE NUMBER OF DAYS PER WEEK YOU HAVE A DRINK CONTAINING ALCOHOL: 4
CONSUMPTION TOTAL: NEGATIVE
EVER HAD A DRINK FIRST THING IN THE MORNING TO STEADY YOUR NERVES TO GET RID OF A HANGOVER: NO
HAVE PEOPLE ANNOYED YOU BY CRITICIZING YOUR DRINKING: NO
ON A TYPICAL DAY WHEN YOU DRINK ALCOHOL HOW MANY DRINKS DO YOU HAVE: 1
HAVE YOU EVER FELT YOU SHOULD CUT DOWN ON YOUR DRINKING: NO

## 2022-11-29 NOTE — ED TRIAGE NOTES
"Chief Complaint   Patient presents with    Shortness of Breath    Cough    Sore Throat    Ear Pain    Weakness    Sent by MD    Sinus Pain     10 days of symptoms above,  Pt reports he has been checking his o2 sats at home and they have been dropping in the 80's.  Lowest was 68% and was shortness of breath.  MD sent him in due to concerns.       Sats w/ ambulation 86%, pt short of breath, puases in between sentences.  After a few minutes pt sats to 92%.  Placed on 2L of O2 in triage.    /63   Pulse 95   Temp 36.2 °C (97.2 °F) (Temporal)   Resp 18   Ht 1.753 m (5' 9\")   Wt 98.8 kg (217 lb 13 oz)   SpO2 92%   BMI 32.17 kg/m²     "

## 2022-11-30 ENCOUNTER — PHARMACY VISIT (OUTPATIENT)
Dept: PHARMACY | Facility: MEDICAL CENTER | Age: 82
End: 2022-11-30
Payer: MEDICARE

## 2022-11-30 ENCOUNTER — APPOINTMENT (OUTPATIENT)
Dept: CARDIOLOGY | Facility: MEDICAL CENTER | Age: 82
DRG: 206 | End: 2022-11-30
Attending: INTERNAL MEDICINE
Payer: MEDICARE

## 2022-11-30 VITALS
WEIGHT: 217.81 LBS | OXYGEN SATURATION: 94 % | HEIGHT: 69 IN | RESPIRATION RATE: 18 BRPM | TEMPERATURE: 97.8 F | DIASTOLIC BLOOD PRESSURE: 85 MMHG | SYSTOLIC BLOOD PRESSURE: 127 MMHG | BODY MASS INDEX: 32.26 KG/M2 | HEART RATE: 86 BPM

## 2022-11-30 LAB
ANION GAP SERPL CALC-SCNC: 12 MMOL/L (ref 7–16)
ANION GAP SERPL CALC-SCNC: 12 MMOL/L (ref 7–16)
BUN SERPL-MCNC: 21 MG/DL (ref 8–22)
BUN SERPL-MCNC: 21 MG/DL (ref 8–22)
CALCIUM SERPL-MCNC: 8.3 MG/DL (ref 8.4–10.2)
CALCIUM SERPL-MCNC: 8.4 MG/DL (ref 8.4–10.2)
CHLORIDE SERPL-SCNC: 96 MMOL/L (ref 96–112)
CHLORIDE SERPL-SCNC: 99 MMOL/L (ref 96–112)
CO2 SERPL-SCNC: 24 MMOL/L (ref 20–33)
CO2 SERPL-SCNC: 25 MMOL/L (ref 20–33)
CREAT SERPL-MCNC: 1.49 MG/DL (ref 0.5–1.4)
CREAT SERPL-MCNC: 1.66 MG/DL (ref 0.5–1.4)
ERYTHROCYTE [DISTWIDTH] IN BLOOD BY AUTOMATED COUNT: 47.4 FL (ref 35.9–50)
GFR SERPLBLD CREATININE-BSD FMLA CKD-EPI: 41 ML/MIN/1.73 M 2
GFR SERPLBLD CREATININE-BSD FMLA CKD-EPI: 47 ML/MIN/1.73 M 2
GLUCOSE SERPL-MCNC: 118 MG/DL (ref 65–99)
GLUCOSE SERPL-MCNC: 96 MG/DL (ref 65–99)
HCT VFR BLD AUTO: 35.2 % (ref 42–52)
HGB BLD-MCNC: 11.6 G/DL (ref 14–18)
LACTATE SERPL-SCNC: 1.4 MMOL/L (ref 0.5–2)
LV EJECT FRACT  99904: 55
LV EJECT FRACT MOD 2C 99903: 57.24
LV EJECT FRACT MOD 4C 99902: 57.82
LV EJECT FRACT MOD BP 99901: 56.99
MCH RBC QN AUTO: 31.9 PG (ref 27–33)
MCHC RBC AUTO-ENTMCNC: 33 G/DL (ref 33.7–35.3)
MCV RBC AUTO: 96.7 FL (ref 81.4–97.8)
PLATELET # BLD AUTO: 201 K/UL (ref 164–446)
PMV BLD AUTO: 10.1 FL (ref 9–12.9)
POTASSIUM SERPL-SCNC: 3.6 MMOL/L (ref 3.6–5.5)
POTASSIUM SERPL-SCNC: 3.7 MMOL/L (ref 3.6–5.5)
PROCALCITONIN SERPL-MCNC: 0.15 NG/ML
RBC # BLD AUTO: 3.64 M/UL (ref 4.7–6.1)
SODIUM SERPL-SCNC: 133 MMOL/L (ref 135–145)
SODIUM SERPL-SCNC: 135 MMOL/L (ref 135–145)
TROPONIN T SERPL-MCNC: 41 NG/L (ref 6–19)
WBC # BLD AUTO: 5.7 K/UL (ref 4.8–10.8)

## 2022-11-30 PROCEDURE — 93306 TTE W/DOPPLER COMPLETE: CPT

## 2022-11-30 PROCEDURE — A9270 NON-COVERED ITEM OR SERVICE: HCPCS | Performed by: INTERNAL MEDICINE

## 2022-11-30 PROCEDURE — RXMED WILLOW AMBULATORY MEDICATION CHARGE: Performed by: INTERNAL MEDICINE

## 2022-11-30 PROCEDURE — 36415 COLL VENOUS BLD VENIPUNCTURE: CPT

## 2022-11-30 PROCEDURE — 84145 PROCALCITONIN (PCT): CPT

## 2022-11-30 PROCEDURE — 700102 HCHG RX REV CODE 250 W/ 637 OVERRIDE(OP): Performed by: INTERNAL MEDICINE

## 2022-11-30 PROCEDURE — 700105 HCHG RX REV CODE 258: Performed by: INTERNAL MEDICINE

## 2022-11-30 PROCEDURE — 83605 ASSAY OF LACTIC ACID: CPT

## 2022-11-30 PROCEDURE — 80048 BASIC METABOLIC PNL TOTAL CA: CPT

## 2022-11-30 PROCEDURE — 99239 HOSP IP/OBS DSCHRG MGMT >30: CPT | Performed by: INTERNAL MEDICINE

## 2022-11-30 PROCEDURE — 93306 TTE W/DOPPLER COMPLETE: CPT | Mod: 26 | Performed by: INTERNAL MEDICINE

## 2022-11-30 PROCEDURE — 84484 ASSAY OF TROPONIN QUANT: CPT

## 2022-11-30 PROCEDURE — 85027 COMPLETE CBC AUTOMATED: CPT

## 2022-11-30 PROCEDURE — 94760 N-INVAS EAR/PLS OXIMETRY 1: CPT

## 2022-11-30 RX ORDER — PSEUDOEPHEDRINE HCL 30 MG
30 TABLET ORAL EVERY 6 HOURS PRN
Qty: 20 TABLET | Refills: 0 | Status: SHIPPED | OUTPATIENT
Start: 2022-11-30 | End: 2022-12-05

## 2022-11-30 RX ORDER — DOXYCYCLINE 100 MG/1
100 TABLET ORAL EVERY 12 HOURS
Qty: 10 TABLET | Refills: 0 | Status: SHIPPED | OUTPATIENT
Start: 2022-11-30 | End: 2022-12-05

## 2022-11-30 RX ORDER — SODIUM CHLORIDE, SODIUM LACTATE, POTASSIUM CHLORIDE, CALCIUM CHLORIDE 600; 310; 30; 20 MG/100ML; MG/100ML; MG/100ML; MG/100ML
INJECTION, SOLUTION INTRAVENOUS CONTINUOUS
Status: ACTIVE | OUTPATIENT
Start: 2022-11-30 | End: 2022-11-30

## 2022-11-30 RX ADMIN — APIXABAN 5 MG: 5 TABLET, FILM COATED ORAL at 05:19

## 2022-11-30 RX ADMIN — LOSARTAN POTASSIUM 50 MG: 25 TABLET, FILM COATED ORAL at 05:19

## 2022-11-30 RX ADMIN — APIXABAN 2.5 MG: 2.5 TABLET, FILM COATED ORAL at 17:04

## 2022-11-30 RX ADMIN — SODIUM CHLORIDE, POTASSIUM CHLORIDE, SODIUM LACTATE AND CALCIUM CHLORIDE: 600; 310; 30; 20 INJECTION, SOLUTION INTRAVENOUS at 10:26

## 2022-11-30 RX ADMIN — ATORVASTATIN CALCIUM 20 MG: 20 TABLET, FILM COATED ORAL at 17:02

## 2022-11-30 RX ADMIN — FLUOXETINE 20 MG: 20 CAPSULE ORAL at 05:19

## 2022-11-30 RX ADMIN — LOSARTAN POTASSIUM 50 MG: 25 TABLET, FILM COATED ORAL at 17:03

## 2022-11-30 RX ADMIN — FINASTERIDE 5 MG: 5 TABLET, FILM COATED ORAL at 17:02

## 2022-11-30 RX ADMIN — DILTIAZEM HYDROCHLORIDE 120 MG: 120 CAPSULE, COATED, EXTENDED RELEASE ORAL at 17:02

## 2022-11-30 RX ADMIN — DOXYCYCLINE 100 MG: 100 TABLET, FILM COATED ORAL at 05:19

## 2022-11-30 RX ADMIN — DOXYCYCLINE 100 MG: 100 TABLET, FILM COATED ORAL at 17:02

## 2022-11-30 ASSESSMENT — CHA2DS2 SCORE
CHF OR LEFT VENTRICULAR DYSFUNCTION: NO
HYPERTENSION: YES
AGE 65 TO 74: NO
PRIOR STROKE OR TIA OR THROMBOEMBOLISM: NO
VASCULAR DISEASE: NO
SEX: FEMALE
CHA2DS2 VASC SCORE: 4
DIABETES: NO
AGE 75 OR GREATER: YES

## 2022-11-30 NOTE — CARE PLAN
The patient is Stable - Low risk of patient condition declining or worsening    Shift Goals  Clinical Goals: wean 02, echo  Patient Goals: go home    Progress made toward(s) clinical / shift goals:     Echo was completed this morning and pt is completely off oxygen. Pt is tolerating well and states he is feeling better. Pt educated by MD at bedside on discharge instructions, pt to be discharged later today.    Problem: Knowledge Deficit - Standard  Goal: Patient and family/care givers will demonstrate understanding of plan of care, disease process/condition, diagnostic tests and medications  Outcome: Met       Patient is not progressing towards the following goals:

## 2022-11-30 NOTE — CARE PLAN
The patient is Stable - Low risk of patient condition declining or worsening    Shift Goals  Clinical Goals: wean O2, echo in am    Progress made toward(s) clinical / shift goals:        Problem: Knowledge Deficit - Standard  Goal: Patient and family/care givers will demonstrate understanding of plan of care, disease process/condition, diagnostic tests and medications  Outcome: Progressing  Note: Discussed POC with patient including medications, labs, and echo. Patient agrees with plan and verbalizes understanding.        Patient is not progressing towards the following goals:

## 2022-11-30 NOTE — ASSESSMENT & PLAN NOTE
Doubt acute CHF at this time,   No sign of volume overload  Last echo was in 5/2021 - repeat echo

## 2022-11-30 NOTE — PROGRESS NOTES
Telemetry Shift Summary     Rhythm: SR with BBB  HR: 80-86  Ectopy: r PVC    Measurements: 0.18/0.16/0.38    Normal Values  Rhythm: SR  HR:   Measurements: 0.12-0.20/0.08-0.10/0.30-0.52

## 2022-11-30 NOTE — H&P
Hospital Medicine History & Physical Note    Date of Service  11/29/2022    Primary Care Physician  Trina Awan M.D.    Consultants  none    Specialist Names: n/a    Code Status  Full Code    Chief Complaint  Chief Complaint   Patient presents with    Shortness of Breath    Cough    Sore Throat    Ear Pain    Weakness    Sent by MD    Sinus Pain     10 days of symptoms above,  Pt reports he has been checking his o2 sats at home and they have been dropping in the 80's.  Lowest was 68% and was shortness of breath.  MD sent him in due to concerns.         History of Presenting Illness  Donny Haas is a 82 y.o. male who presented 11/29/2022 with increasing shortness of breath with productive cough and worsening exertional dyspnea.  Patient with 1 week history of productive cough with green sputum and shortness of breath with minimal exertion.  He has a history of atrial fibrillation and ablation in 8/2021 and no known recurrence of a fib since that time.  He reports minimal lower extremity edema that disappears nightly with rest.  He was in his usual state of health until about 1 week ago when he had significant difficulty with nasal congestion and consequently shortness of breath.  He has BENJA and uses a CPAP at night and hasn't been able to breathe well with it on.  He has tried OTC anti-histamine and decongestant without improvement of symptoms.  He has sinus pressure and pain.      I discussed the plan of care with patient.    Review of Systems  Review of Systems   Constitutional:  Negative for chills and fever.   HENT:  Positive for congestion and sore throat.    Eyes:  Negative for blurred vision and photophobia.   Respiratory:  Positive for cough, sputum production and shortness of breath. Negative for wheezing.    Cardiovascular:  Negative for chest pain, claudication and leg swelling.   Gastrointestinal:  Negative for abdominal pain, constipation, diarrhea, heartburn and vomiting.    Genitourinary:  Negative for dysuria and hematuria.   Musculoskeletal:  Negative for joint pain and myalgias.   Skin:  Negative for itching and rash.   Neurological:  Negative for dizziness, sensory change, speech change, weakness and headaches.   Psychiatric/Behavioral:  Negative for depression. The patient is not nervous/anxious and does not have insomnia.      Past Medical History   has a past medical history of Arrhythmia, Cataract, Hemorrhagic disorder (HCC), Hyperlipidemia, Hypertension, Psychiatric problem, Sleep apnea, and Thyroid disease.    Surgical History   has a past surgical history that includes dental surgery; tonsillectomy; hernia repair; cataract extraction with iol; and other orthopedic surgery.     Family History  family history includes Cancer in his mother; Diabetes in his brother and sister; Heart Attack in his maternal grandfather; No Known Problems in his maternal grandmother; Thyroid in his brother and sister.   Family history reviewed with patient. There is no family history that is pertinent to the chief complaint.     Social History   reports that he quit smoking about 43 years ago. His smoking use included cigarettes. He has a 19.00 pack-year smoking history. He has never used smokeless tobacco. He reports current alcohol use. He reports that he does not use drugs.    Allergies  Allergies   Allergen Reactions    Penicillins Rash     rash    Oxycodone-Acetaminophen Unspecified     Hallucinations per pt.     Penicillin G Rash and Unspecified    Latex      Allergy test       Medications  Prior to Admission Medications   Prescriptions Last Dose Informant Patient Reported? Taking?   Cyanocobalamin (VITAMIN B-12 PO) 11/29/2022 at 1200 Patient Yes No   Sig: Take 1 Tablet by mouth every day.   DILTIAZem CD (CARDIZEM CD) 120 MG CAPSULE SR 24 HR 11/28/2022 at 1800 Patient No No   Sig: Take 1 Capsule by mouth every day.   Patient taking differently: Take 120 mg by mouth every evening.   ELIQUIS  5 MG Tab 11/29/2022 at 0900 Patient No No   Sig: TAKE 1 TABLET BY MOUTH TWICE DAILY   Patient taking differently: Take 5 mg by mouth 2 times a day.   FLUoxetine (PROZAC) 20 MG Cap 11/29/2022 at 0900 Patient Yes No   Sig: Take 20 mg by mouth every morning.   FOLIC ACID PO 11/29/2022 at 1200 Patient Yes No   Sig: Take 1 mL by mouth every day. Liquid*   Fexofenadine HCl (ALLEGRA PO) 11/29/2022 at 1100 Patient Yes Yes   Sig: Take 1 Tablet by mouth as needed (For allergies).   Multiple Vitamins-Minerals (ICAPS AREDS 2 PO) 11/29/2022 at 1200 Patient Yes No   Sig: Take 1 Capsule by mouth 2 times a day.   atorvastatin (LIPITOR) 20 MG Tab 11/28/2022 at 2200 Patient No No   Sig: TAKE 1 TABLET BY MOUTH EVERY DAY   Patient taking differently: Take 20 mg by mouth every evening.   finasteride (PROSCAR) 5 MG Tab 11/28/2022 at 2000 Patient Yes No   Sig: Take 5 mg by mouth every evening.   levothyroxine (SYNTHROID) 112 MCG Tab 11/29/2022 at 0800 Patient Yes No   Sig: Take 112 mcg by mouth every day.   losartan (COZAAR) 50 MG Tab 11/29/2022 at 1200 Patient Yes No   Sig: Take 50 mg by mouth 2 times a day.   terazosin (HYTRIN) 5 MG Cap 11/28/2022 at 2200 Patient Yes Yes   Sig: Take 5 mg by mouth every evening.   vitamin D (CHOLECALCIFEROL) 1000 Unit (25 mcg) Tab 11/29/2022 at 1200 Patient Yes No   Sig: Take 1,000 Units by mouth every 48 hours.      Facility-Administered Medications: None       Physical Exam  Temp:  [36.2 °C (97.2 °F)] 36.2 °C (97.2 °F)  Pulse:  [82-95] 94  Resp:  [14-33] 33  BP: (105-141)/(61-63) 123/61  SpO2:  [92 %-99 %] 98 %  Blood Pressure : 123/61   Temperature: 36.2 °C (97.2 °F)   Pulse: 94   Respiration: (!) 33   Pulse Oximetry: 98 %       Physical Exam  Vitals and nursing note reviewed.   Constitutional:       General: He is not in acute distress.     Appearance: Normal appearance.   HENT:      Head: Normocephalic and atraumatic.      Nose: Congestion present.      Mouth/Throat:      Pharynx: Posterior  oropharyngeal erythema present.   Eyes:      General: No scleral icterus.     Extraocular Movements: Extraocular movements intact.   Cardiovascular:      Rate and Rhythm: Normal rate and regular rhythm.      Pulses: Normal pulses.      Heart sounds: Normal heart sounds. No murmur heard.  Pulmonary:      Effort: Pulmonary effort is normal. No respiratory distress.      Breath sounds: Normal breath sounds. No wheezing, rhonchi or rales.   Abdominal:      General: Abdomen is flat. Bowel sounds are normal. There is no distension.      Palpations: Abdomen is soft.      Tenderness: There is no rebound.   Musculoskeletal:         General: No swelling or tenderness.      Cervical back: Normal range of motion and neck supple.   Lymphadenopathy:      Cervical: Cervical adenopathy (bilateral) present.   Skin:     Coloration: Skin is not jaundiced.      Findings: No erythema.   Neurological:      General: No focal deficit present.      Mental Status: He is alert and oriented to person, place, and time. Mental status is at baseline.      Cranial Nerves: No cranial nerve deficit.   Psychiatric:         Mood and Affect: Mood normal.         Behavior: Behavior normal.       Laboratory:  Recent Labs     11/29/22  1623   WBC 7.2   RBC 4.04*   HEMOGLOBIN 13.2*   HEMATOCRIT 39.2*   MCV 97.0   MCH 32.7   MCHC 33.7   RDW 47.2   PLATELETCT 234   MPV 10.1     Recent Labs     11/29/22  1623   SODIUM 136   POTASSIUM 3.8   CHLORIDE 98   CO2 25   GLUCOSE 101*   BUN 18   CREATININE 1.65*   CALCIUM 8.9     Recent Labs     11/29/22  1623   ALTSGPT 22   ASTSGOT 24   ALKPHOSPHAT 110*   TBILIRUBIN 0.7   GLUCOSE 101*         Recent Labs     11/29/22  1623   NTPROBNP 724*         Recent Labs     11/29/22  1623   TROPONINT 45*       Imaging:  DX-CHEST-PORTABLE (1 VIEW)   Final Result      1.  Bibasilar atelectasis.      2.  Cardiomegaly.      EC-ECHOCARDIOGRAM COMPLETE W/O CONT    (Results Pending)       X-Ray:  I have personally reviewed the images  and compared with prior images.    Assessment/Plan:  Justification for Admission Status  I anticipate this patient will require at least two midnights for appropriate medical management, necessitating inpatient admission because oxygen needs and echo for evaluation for heart failure    Patient will need a Telemetry bed on MEDICAL service .  The need is secondary to hypoxia and history of atrial fibrillation.    * Hypoxia- (present on admission)  Assessment & Plan  Severe nasal congestion, currently requiring 2L nasal cannula to maintain saturation  Mild swelling, MARSHALL present since URI developed  Oxygen and wean as needed.    Troponin level elevated  Assessment & Plan  Very mild elevation of trop  Trend and tele monitoring  Suspect is due to tachycardia  Denies chest pain.      MUNA (acute kidney injury) (HCC)  Assessment & Plan  Patient slightly elevated CR  Given slightly elevated BNP, will not start IV fluids at this time but will also not give diuretics.  PO intake per patient    Dyspnea on exertion  Assessment & Plan  Doubt acute CHF at this time,   No sign of volume overload  Last echo was in 5/2021 - repeat echo      Sinusitis  Assessment & Plan  Suspect is the nidus of his exertional dyspnea and current hypoxia  Augmentin and sudafed      Paroxysmal atrial fibrillation (HCC)- (present on admission)  Assessment & Plan  S/p ablation in 8/2021, no known episodes of a fib since that time  Continue anticoagulation and rate control with cardizem      Essential hypertension- (present on admission)  Assessment & Plan  Resume home meds          VTE prophylaxis: therapeutic anticoagulation with eliquis

## 2022-11-30 NOTE — ED PROVIDER NOTES
ED Provider Note    Scribed for Luis Hair M.D. by Randal Christianson. 11/29/2022  4:25 PM    Primary care provider: Trina Awan M.D.  Means of arrival: Walk in  History obtained from: Patient  History limited by: None    CHIEF COMPLAINT  Chief Complaint   Patient presents with    Shortness of Breath    Cough    Sore Throat    Ear Pain    Weakness    Sent by MD    Sinus Pain     10 days of symptoms above,  Pt reports he has been checking his o2 sats at home and they have been dropping in the 80's.  Lowest was 68% and was shortness of breath.  MD sent him in due to concerns.         HPI  Donny Haas is a 82 y.o. male who presents to the Emergency Department for evaluation of moderate shortness of breath onset 2-3 days ago. He has a history of A-fib and cardiac ablation for which he is compliant with daily Eliquis. The patient states that he recently returned from visiting his son in [place] 10 days ago where he was in close contact with his sick grandchildren. dEward drove 9 hours back and forth during this trip. He subsequently developed a sore throat and other flu-like symptoms. The patient was ultimately prompted to visit the ED today after he noticed his O2 SAT's were consistently low in the 80s with a minimum value of 68%. Associated symptoms include bilateral lower extremity swelling, increased fatigue, dyspnea on exertion, cough, rhinorrhea, bilateral ear pain, and sore throat. He denies any chest pain, nausea, vomiting, or abdominal pain. No alleviating or exacerbating factors noted. The patient denies any history of PE or DVT. He is vaccinated and boosted against COVID-19. The patient notes a recent negative at-home test for COVID-19.    REVIEW OF SYSTEMS  Pertinent positives include shortness of breath, bilateral lower extremity swelling, increased fatigue, dyspnea on exertion, cough, rhinorrhea, bilateral ear pain, and sore throat. Pertinent negatives include no chest pain, nausea,  vomiting, or abdominal pain.  All other systems reviewed and negative.    PAST MEDICAL HISTORY   has a past medical history of Arrhythmia, Cataract, Hemorrhagic disorder (HCC), Hyperlipidemia, Hypertension, Psychiatric problem, Sleep apnea, and Thyroid disease.    SURGICAL HISTORY   has a past surgical history that includes dental surgery; tonsillectomy; hernia repair; cataract extraction with iol; and other orthopedic surgery.    SOCIAL HISTORY  Social History     Tobacco Use    Smoking status: Former     Packs/day: 1.00     Years: 19.00     Pack years: 19.00     Types: Cigarettes     Quit date: 3/22/1979     Years since quittin.7    Smokeless tobacco: Never   Vaping Use    Vaping Use: Never used   Substance Use Topics    Alcohol use: Yes     Alcohol/week: 0.0 oz     Comment: 4 drinks a week.    Drug use: No      Social History     Substance and Sexual Activity   Drug Use No       FAMILY HISTORY  Family History   Problem Relation Age of Onset    Cancer Mother         breast cancer    Thyroid Sister         Hasimotos    Diabetes Sister         Type 2 DM    Thyroid Brother         Hasimotos    Diabetes Brother         Type 2 DM    No Known Problems Maternal Grandmother     Heart Attack Maternal Grandfather        CURRENT MEDICATIONS  Home Medications       Reviewed by Oksana Lloyd (Pharmacy Tech) on 22 at 1657  Med List Status: Complete     Medication Last Dose Status   atorvastatin (LIPITOR) 20 MG Tab 2022 Active   Cyanocobalamin (VITAMIN B-12 PO) 2022 Active   DILTIAZem CD (CARDIZEM CD) 120 MG CAPSULE SR 24 HR 2022 Active   ELIQUIS 5 MG Tab 2022 Active   Fexofenadine HCl (ALLEGRA PO) 2022 Active   finasteride (PROSCAR) 5 MG Tab 2022 Active   FLUoxetine (PROZAC) 20 MG Cap 2022 Active   FOLIC ACID PO 2022 Active   levothyroxine (SYNTHROID) 112 MCG Tab 2022 Active   losartan (COZAAR) 50 MG Tab 2022 Active   Multiple Vitamins-Minerals  "(ICAPS AREDS 2 PO) 11/29/2022 Active   terazosin (HYTRIN) 5 MG Cap 11/28/2022 Active   vitamin D (CHOLECALCIFEROL) 1000 Unit (25 mcg) Tab 11/29/2022 Active                    ALLERGIES  Allergies   Allergen Reactions    Penicillins Rash     rash    Oxycodone-Acetaminophen Unspecified     Hallucinations per pt.     Penicillin G Rash and Unspecified    Latex      Allergy test       PHYSICAL EXAM  VITAL SIGNS: /63   Pulse 95   Temp 36.2 °C (97.2 °F) (Temporal)   Resp 18   Ht 1.753 m (5' 9\")   Wt 98.8 kg (217 lb 13 oz)   SpO2 92%   BMI 32.17 kg/m²     Constitutional: Well developed, Well nourished, Mild distress, Non-toxic appearance.   HENT: Normocephalic, Atraumatic, Bilateral external ears normal, Oropharynx moist, No oral exudates.   Eyes: PERRLA, EOMI, Conjunctiva normal, No discharge.   Neck: No tenderness, Supple, No stridor.   Lymphatic: No lymphadenopathy noted.   Cardiovascular: Normal heart rate, Normal rhythm.   Thorax & Lungs: Decreased breath sounds throughout, mild respiratory distress, , No wheezing, No crackles.   Abdomen: Soft, No tenderness, No masses, No pulsatile masses.   Skin: Warm, Dry, No erythema, No rash.   Extremities: Trace lower extremity edema No cyanosis.   Musculoskeletal: No tenderness to palpation or major deformities noted.  Intact distal pulses  Neurologic: Awake, alert. Moves all extremities spontaneously.  Psychiatric: Affect normal, Judgment normal, Mood normal.     LABS  Results for orders placed or performed during the hospital encounter of 11/29/22   Lactic acid (lactate)   Result Value Ref Range    Lactic Acid 1.2 0.5 - 2.0 mmol/L   CBC With Differential   Result Value Ref Range    WBC 7.2 4.8 - 10.8 K/uL    RBC 4.04 (L) 4.70 - 6.10 M/uL    Hemoglobin 13.2 (L) 14.0 - 18.0 g/dL    Hematocrit 39.2 (L) 42.0 - 52.0 %    MCV 97.0 81.4 - 97.8 fL    MCH 32.7 27.0 - 33.0 pg    MCHC 33.7 33.7 - 35.3 g/dL    RDW 47.2 35.9 - 50.0 fL    Platelet Count 234 164 - 446 K/uL    " MPV 10.1 9.0 - 12.9 fL    Neutrophils-Polys 65.30 44.00 - 72.00 %    Lymphocytes 15.70 (L) 22.00 - 41.00 %    Monocytes 17.50 (H) 0.00 - 13.40 %    Eosinophils 0.80 0.00 - 6.90 %    Basophils 0.30 0.00 - 1.80 %    Immature Granulocytes 0.40 0.00 - 0.90 %    Nucleated RBC 0.00 /100 WBC    Neutrophils (Absolute) 4.67 1.82 - 7.42 K/uL    Lymphs (Absolute) 1.12 1.00 - 4.80 K/uL    Monos (Absolute) 1.25 (H) 0.00 - 0.85 K/uL    Eos (Absolute) 0.06 0.00 - 0.51 K/uL    Baso (Absolute) 0.02 0.00 - 0.12 K/uL    Immature Granulocytes (abs) 0.03 0.00 - 0.11 K/uL    NRBC (Absolute) 0.00 K/uL   Comp Metabolic Panel   Result Value Ref Range    Sodium 136 135 - 145 mmol/L    Potassium 3.8 3.6 - 5.5 mmol/L    Chloride 98 96 - 112 mmol/L    Co2 25 20 - 33 mmol/L    Anion Gap 13.0 7.0 - 16.0    Glucose 101 (H) 65 - 99 mg/dL    Bun 18 8 - 22 mg/dL    Creatinine 1.65 (H) 0.50 - 1.40 mg/dL    Calcium 8.9 8.4 - 10.2 mg/dL    AST(SGOT) 24 12 - 45 U/L    ALT(SGPT) 22 2 - 50 U/L    Alkaline Phosphatase 110 (H) 30 - 99 U/L    Total Bilirubin 0.7 0.1 - 1.5 mg/dL    Albumin 4.0 3.2 - 4.9 g/dL    Total Protein 7.1 6.0 - 8.2 g/dL    Globulin 3.1 1.9 - 3.5 g/dL    A-G Ratio 1.3 g/dL   CoV-2, FLU A/B, and RSV by PCR (2-4 Hours CEPHEID) : Collect NP swab in VTM    Specimen: Respirate   Result Value Ref Range    Influenza virus A RNA Negative Negative    Influenza virus B, PCR Negative Negative    RSV, PCR Negative Negative    SARS-CoV-2 by PCR NotDetected     SARS-CoV-2 Source NP Swab    D-DIMER   Result Value Ref Range    D-Dimer Screen 0.51 (H) 0.00 - 0.50 ug/mL (FEU)   TROPONIN   Result Value Ref Range    Troponin T 45 (H) 6 - 19 ng/L   ESTIMATED GFR   Result Value Ref Range    GFR (CKD-EPI) 41 (A) >60 mL/min/1.73 m 2   proBrain Natriuretic Peptide, NT   Result Value Ref Range    NT-proBNP 724 (H) 0 - 125 pg/mL   EKG   Result Value Ref Range    Report       [facility] Emergency Dept.    Test Date:  2022-11-29  Pt Name:     GUERRERO NICOLE        Department: [facility]  MRN:        [id number]                      Room:  Gender:     Male                         Technician: [id number]  :        [id number]                   Requested By:ER TRIAGE PROTOCOL  Order #:    [id number]                    Reading MD: OBI RODRIGEZ MD    Measurements  Intervals                                Axis  Rate:       89                           P:          0  GA:         195                          QRS:        35  QRSD:       131                          T:          14  QT:         386  QTc:        470    Interpretive Statements  Sinus rhythm  Right bundle branch block  Minimal ST elevation, anterior leads  Artifact in lead(s) I,III,aVR,aVL,aVF,V1,V2,V3,V4,V5,V6 and baseline wander  in  lead(s) V2  Compared to ECG 2021 15:26:17  Right bundle-branch block now present  ST (T wave) deviation now present  Sinus bradycardia no longe r present  Electronically Signed On 2022 16:32:06 PST by OBI RODRIGEZ MD          RADIOLOGY  DX-CHEST-PORTABLE (1 VIEW)   Final Result      1.  Bibasilar atelectasis.      2.  Cardiomegaly.        The radiologist's interpretation of all radiological studies have been reviewed by me.      COURSE & MEDICAL DECISION MAKING  Pertinent Labs & Imaging studies reviewed. (See chart for details)    4:25 PM - Patient seen and examined at bedside. Ordered EKG, Blood Culture, Urine Culture (New), UA, CMP, CBC with diff, Lactic Acid (Lactate), CoV-2, Flu A/B, RSV by PCR, D-Dimer, Troponin, and DX-Chest to evaluate his symptoms. The differential diagnoses include but are not limited to: influenza, RSV, COVID-19, pneumonia, or CHF. Discussed utilizing labs and imaging to further evaluate the patient, he is amenable to the plan of care. He understand that we will likely proceed with a plan for admission for further cardiac work-up.    6:18 PM I discussed the patient's case and the above findings with Dr. Shelby (Hospitalist) who  will assess the patient for hospitalization.       Decision Making:  Patient is coming in with increasing shortness of breath dyspnea on exertion URI type symptoms.  Patient is hypoxic with ambulation.  Work-up here shows negative COVID flu RSV.  Chest x-ray is negative, the patient's BNP is slightly elevated.  Patient will need to be admitted to the hospital, discussed case with the hospitalist for hospitalization.      DISPOSITION:  Patient will be hospitalized by Dr. Shelby in guarded condition.    FINAL IMPRESSION  1. Upper respiratory tract infection, unspecified type    2. Acute respiratory failure with hypoxia (HCC)          Randal MULLINS (Scribe), am scribing for, and in the presence of, Luis Hair M.D..    Electronically signed by: Randal Christianson (Scribe), 11/29/2022    Luis MULLINS M.D. personally performed the services described in this documentation, as scribed by Randal Christianson in my presence, and it is both accurate and complete.    The note accurately reflects work and decisions made by me.  Luis Hair M.D.  11/29/2022  10:30 PM

## 2022-11-30 NOTE — ED NOTES
1625 Iv placed , labs bldcx x 1 drawn and taken to lab. poc update given to pt, results pending at this time   1630 covid collected  1650 pcxr doone

## 2022-11-30 NOTE — ED NOTES
Introduced self and RN role. Two patient identifiers used.  Oriented patient to room and how to get help. Side rails up and call light within reach. Assessment completed. Labs obtained: yes. Family at bedside: yes. Pt verbalizes understanding.   BC obtained. Verified what medications pt has taken already today.

## 2022-11-30 NOTE — PROGRESS NOTES
Pt arrives to unit from ER via gurSebring. Ambulated from gurney to bed. PT A&Ox4. Tele monitor applied, vitals taken. History, allergies and med rec reviewed and admission profile completed.     Pt oriented to unit and POC discussed, including medications, labs, and echo. Welcome folder provided and discussed. Communication board filled out. Pt instructed to call light usage and encouraged to call for any questions, needs, or concerns and prior to getting out of bed. Fall precautions in place. Pt agrees with the plan and declines any needs at this time. Bed locked and low and bed alarm on.

## 2022-11-30 NOTE — DISCHARGE SUMMARY
Discharge Summary    CHIEF COMPLAINT ON ADMISSION  Chief Complaint   Patient presents with    Shortness of Breath    Cough    Sore Throat    Ear Pain    Weakness    Sent by MD    Sinus Pain     10 days of symptoms above,  Pt reports he has been checking his o2 sats at home and they have been dropping in the 80's.  Lowest was 68% and was shortness of breath.  MD sent him in due to concerns.         Reason for Admission  Ear Pain, Sore Throat, Weak, Short*     Admission Date  11/29/2022    CODE STATUS  Full Code    HPI & HOSPITAL COURSE  Donny Haas is a 82 y.o. male with hypertension, hyperlipidemia, atrial fibrillation status post ablation (8/2021), BENJA on CPAP, admitted 11/29/2022 with shortness of breath, productive cough, sinus pressure and pain, nasal congestion, and worsening exertional dyspnea for 1 week.  His nose is clogged that he could not breathe properly, being a nasal breather. He reports minimal lower extremity edema that this appears nightly.  He endorses poor oral intake in the last several days.  He tried over-the-counter antihistamine and decongestants without improvement.  WBC is normal.  Electrolytes are normal.  Creatinine 1.65.  Troponin 45.  NT proBNP 724.  Influenza, RSV, and COVID-19 PCR's were negative. Chest x-ray only showed bibasilar atelectasis and cardiomegaly.  He is placed on oxygen supplement to maintain saturation.  His diuretics were held due to his MUNA.  He is started on doxycycline and Sudafed.    He quickly clinically improved, and he was able to be weaned off oxygen supplement, and was saturating well on room air.  His WBC count remained normal.  He was given IV fluid hydration as his elevation in creatinine was felt to be prerenal due to dehydration.  His lactate remained normal, and troponins remained flat.  Blood cultures remain negative.  He also had improved sense of wellbeing, with improvement in his sinus pressure, and sinus and nasal congestion.  He  also started to breathe better.  He remained hemodynamically stable and afebrile.    I have personally seen and examined the patient on the day of discharge. With his clinical improvement, he was deemed ready to discharge from the hospital as he did not have any further hospitalization needs. Patient felt comfortable going home. The discharge plan was discussed with the patient, with which he was agreeable to.     Therefore, he is discharged in good and stable condition to home with close outpatient follow-up.    The patient recovered much more quickly than anticipated on admission.    Discharge Date  11/30/2022      FOLLOW UP ITEMS POST DISCHARGE  -He will complete 5 days course of oral doxycycline.  I also prescribed him Sudafed for his sinus congestion.  -He is counseled to keep himself well-hydrated at home.  -Follow-up with PCP.  - counseled to seek immediate medical attention, or return to the ED for recurrent or worsening symptoms.      DISCHARGE DIAGNOSES  Principal Problem:    Hypoxia POA: Yes  Active Problems:    Obesity (BMI 30-39.9) POA: Yes    Obesity (BMI 35.0-39.9 without comorbidity) POA: Yes    Essential hypertension POA: Yes    Paroxysmal atrial fibrillation (HCC) POA: Yes    Sinusitis POA: Unknown    Dyspnea on exertion POA: Unknown    MUNA (acute kidney injury) (HCC) POA: Unknown    Troponin level elevated POA: Unknown  Resolved Problems:    * No resolved hospital problems. *      FOLLOW UP  No future appointments.  Trina Awan M.D.  [id number] [facility]  [place] [id number]  [phone]    Schedule an appointment as soon as possible for a visit in 1 week(s)  Please make follow up appointment.      MEDICATIONS ON DISCHARGE     Medication List        START taking these medications        Instructions   doxycycline monohydrate 100 MG tablet  Commonly known as: ADOXA   Take 1 Tablet by mouth every 12 hours for 5 days.  Dose: 100 mg     pseudoephedrine 30 MG Tabs  Commonly known as:  SUDAFED   Take 1 Tablet by mouth every 6 hours as needed (sinus pressure and congestion) for up to 5 days.  Dose: 30 mg            CHANGE how you take these medications        Instructions   atorvastatin 20 MG Tabs  What changed: when to take this  Commonly known as: LIPITOR   TAKE 1 TABLET BY MOUTH EVERY DAY     DILTIAZem  MG Cp24  What changed: when to take this  Commonly known as: Cardizem CD   Take 1 Capsule by mouth every day.  Dose: 120 mg     Eliquis 5mg Tabs  What changed: how much to take  Generic drug: apixaban   TAKE 1 TABLET BY MOUTH TWICE DAILY            CONTINUE taking these medications        Instructions   ALLEGRA PO   Take 1 Tablet by mouth as needed (For allergies).  Dose: 1 Tablet     finasteride 5 MG Tabs  Commonly known as: PROSCAR   Take 5 mg by mouth every evening.  Dose: 5 mg     FLUoxetine 20 MG Caps  Commonly known as: PROZAC   Take 20 mg by mouth every morning.  Dose: 20 mg     FOLIC ACID PO   Take 1 mL by mouth every day. Liquid*  Dose: 1 mL     ICAPS AREDS 2 PO   Take 1 Capsule by mouth 2 times a day.  Dose: 1 Capsule     levothyroxine 112 MCG Tabs  Commonly known as: SYNTHROID   Take 112 mcg by mouth every day.  Dose: 112 mcg     losartan 50 MG Tabs  Commonly known as: COZAAR   Take 50 mg by mouth 2 times a day.  Dose: 50 mg     terazosin 5 MG Caps  Commonly known as: HYTRIN   Take 5 mg by mouth every evening.  Dose: 5 mg     VITAMIN B-12 PO   Take 1 Tablet by mouth every day.  Dose: 1 Tablet     vitamin D3 1000 Unit (25 mcg) Tabs  Commonly known as: cholecalciferol   Take 1,000 Units by mouth every 48 hours.  Dose: 1,000 Units              Allergies  Allergies   Allergen Reactions    Penicillins Rash     rash    Oxycodone-Acetaminophen Unspecified     Hallucinations per pt.     Penicillin G Rash and Unspecified    Latex      Allergy test       DIET  Orders Placed This Encounter   Procedures    Diet Order Diet: Regular     Standing Status:   Standing     Number of Occurrences:    1     Order Specific Question:   Diet:     Answer:   Regular [1]       ACTIVITY  As tolerated.  Weight bearing as tolerated    CONSULTATIONS  None    PROCEDURES  None    LABORATORY  Lab Results   Component Value Date    SODIUM 135 11/30/2022    POTASSIUM 3.6 11/30/2022    CHLORIDE 99 11/30/2022    CO2 24 11/30/2022    GLUCOSE 118 (H) 11/30/2022    BUN 21 11/30/2022    CREATININE 1.66 (H) 11/30/2022        Lab Results   Component Value Date    WBC 5.7 11/30/2022    HEMOGLOBIN 11.6 (L) 11/30/2022    HEMATOCRIT 35.2 (L) 11/30/2022    PLATELETCT 201 11/30/2022        Total time of the discharge process = 40 minutes.

## 2022-11-30 NOTE — DISCHARGE INSTRUCTIONS
Discharge Instructions    Discharged to home by car with relative. Discharged via wheelchair, hospital escort: Yes.  Special equipment needed: Not Applicable    Be sure to schedule a follow-up appointment with your primary care doctor or any specialists as instructed.     Discharge Plan:   Diet Plan: Discussed  Activity Level: Discussed  Confirmed Follow up Appointment: Patient to Call and Schedule Appointment  Confirmed Symptoms Management: Discussed  Medication Reconciliation Updated: Yes  Influenza Vaccine Indication: Not indicated: Previously immunized this influenza season and > 8 years of age    I understand that a diet low in cholesterol, fat, and sodium is recommended for good health. Unless I have been given specific instructions below for another diet, I accept this instruction as my diet prescription.   Other diet: Cardiac    Special Instructions: None    -Is this patient being discharged with medication to prevent blood clots?  No    Is patient discharged on Warfarin / Coumadin?   No       Sinusitis, Adult  Sinusitis is inflammation of your sinuses. Sinuses are hollow spaces in the bones around your face. Your sinuses are located:  Around your eyes.  In the middle of your forehead.  Behind your nose.  In your cheekbones.  Mucus normally drains out of your sinuses. When your nasal tissues become inflamed or swollen, mucus can become trapped or blocked. This allows bacteria, viruses, and fungi to grow, which leads to infection. Most infections of the sinuses are caused by a virus.  Sinusitis can develop quickly. It can last for up to 4 weeks (acute) or for more than 12 weeks (chronic). Sinusitis often develops after a cold.  What are the causes?  This condition is caused by anything that creates swelling in the sinuses or stops mucus from draining. This includes:  Allergies.  Asthma.  Infection from bacteria or viruses.  Deformities or blockages in your nose or sinuses.  Abnormal growths in the nose (nasal  polyps).  Pollutants, such as chemicals or irritants in the air.  Infection from fungi (rare).  What increases the risk?  You are more likely to develop this condition if you:  Have a weak body defense system (immune system).  Do a lot of swimming or diving.  Overuse nasal sprays.  Smoke.  What are the signs or symptoms?  The main symptoms of this condition are pain and a feeling of pressure around the affected sinuses. Other symptoms include:  Stuffy nose or congestion.  Thick drainage from your nose.  Swelling and warmth over the affected sinuses.  Headache.  Upper toothache.  A cough that may get worse at night.  Extra mucus that collects in the throat or the back of the nose (postnasal drip).  Decreased sense of smell and taste.  Fatigue.  A fever.  Sore throat.  Bad breath.  How is this diagnosed?  This condition is diagnosed based on:  Your symptoms.  Your medical history.  A physical exam.  Tests to find out if your condition is acute or chronic. This may include:  Checking your nose for nasal polyps.  Viewing your sinuses using a device that has a light (endoscope).  Testing for allergies or bacteria.  Imaging tests, such as an MRI or CT scan.  In rare cases, a bone biopsy may be done to rule out more serious types of fungal sinus disease.  How is this treated?  Treatment for sinusitis depends on the cause and whether your condition is chronic or acute.  If caused by a virus, your symptoms should go away on their own within 10 days. You may be given medicines to relieve symptoms. They include:  Medicines that shrink swollen nasal passages (topical intranasal decongestants).  Medicines that treat allergies (antihistamines).  A spray that eases inflammation of the nostrils (topical intranasal corticosteroids).  Rinses that help get rid of thick mucus in your nose (nasal saline washes).  If caused by bacteria, your health care provider may recommend waiting to see if your symptoms improve. Most bacterial  infections will get better without antibiotic medicine. You may be given antibiotics if you have:  A severe infection.  A weak immune system.  If caused by narrow nasal passages or nasal polyps, you may need to have surgery.  Follow these instructions at home:  Medicines  Take, use, or apply over-the-counter and prescription medicines only as told by your health care provider. These may include nasal sprays.  If you were prescribed an antibiotic medicine, take it as told by your health care provider. Do not stop taking the antibiotic even if you start to feel better.  Hydrate and humidify    Drink enough fluid to keep your urine pale yellow. Staying hydrated will help to thin your mucus.  Use a cool mist humidifier to keep the humidity level in your home above 50%.  Inhale steam for 10-15 minutes, 3-4 times a day, or as told by your health care provider. You can do this in the bathroom while a hot shower is running.  Limit your exposure to cool or dry air.  Rest  Rest as much as possible.  Sleep with your head raised (elevated).  Make sure you get enough sleep each night.  General instructions    Apply a warm, moist washcloth to your face 3-4 times a day or as told by your health care provider. This will help with discomfort.  Wash your hands often with soap and water to reduce your exposure to germs. If soap and water are not available, use hand .  Do not smoke. Avoid being around people who are smoking (secondhand smoke).  Keep all follow-up visits as told by your health care provider. This is important.  Contact a health care provider if:  You have a fever.  Your symptoms get worse.  Your symptoms do not improve within 10 days.  Get help right away if:  You have a severe headache.  You have persistent vomiting.  You have severe pain or swelling around your face or eyes.  You have vision problems.  You develop confusion.  Your neck is stiff.  You have trouble breathing.  Summary  Sinusitis is soreness and  inflammation of your sinuses. Sinuses are hollow spaces in the bones around your face.  This condition is caused by nasal tissues that become inflamed or swollen. The swelling traps or blocks the flow of mucus. This allows bacteria, viruses, and fungi to grow, which leads to infection.  If you were prescribed an antibiotic medicine, take it as told by your health care provider. Do not stop taking the antibiotic even if you start to feel better.  Keep all follow-up visits as told by your health care provider. This is important.  This information is not intended to replace advice given to you by your health care provider. Make sure you discuss any questions you have with your health care provider.  Document Released: 12/18/2006 Document Revised: 05/20/2019 Document Reviewed: 05/20/2019  [employer] Patient Education © 2020 [employer]

## 2022-11-30 NOTE — PROGRESS NOTES
4 Eyes Skin Assessment Completed by PANCHO Reyes and PANCHO Munoz.    Head WDL  Ears WDL  Nose WDL  Mouth WDL  Neck WDL  Breast/Chest WDL  Shoulder Blades WDL  Spine WDL  (R) Arm/Elbow/Hand WDL  (L) Arm/Elbow/Hand WDL  Abdomen WDL  Groin WDL  Scrotum/Coccyx/Buttocks WDL  (R) Leg WDL  (L) Leg WDL  (R) Heel/Foot/Toe WDL  (L) Heel/Foot/Toe WDL          Devices In Places Tele Box      Interventions In Place Pillows    Possible Skin Injury No    Pictures Uploaded Into Epic N/A  Wound Consult Placed N/A  RN Wound Prevention Protocol Ordered No

## 2022-11-30 NOTE — ED NOTES
Med rec updated and complete, per pt   Allergies reviewed, per pt  Interviewed pt with wife at bedside with permission from pt.  Pt had a list of medications that he read to this writer

## 2022-11-30 NOTE — ASSESSMENT & PLAN NOTE
Severe nasal congestion, currently requiring 2L nasal cannula to maintain saturation  Mild swelling, MARSHALL present since URI developed  Oxygen and wean as needed.

## 2022-11-30 NOTE — ASSESSMENT & PLAN NOTE
Very mild elevation of trop  Trend and tele monitoring  Suspect is due to tachycardia  Denies chest pain.

## 2022-12-04 LAB
BACTERIA BLD CULT: NORMAL
BACTERIA BLD CULT: NORMAL
SIGNIFICANT IND 70042: NORMAL
SIGNIFICANT IND 70042: NORMAL
SITE SITE: NORMAL
SITE SITE: NORMAL
SOURCE SOURCE: NORMAL
SOURCE SOURCE: NORMAL

## 2023-01-10 ENCOUNTER — HOSPITAL ENCOUNTER (OUTPATIENT)
Dept: LAB | Facility: MEDICAL CENTER | Age: 83
End: 2023-01-10
Attending: UROLOGY
Payer: MEDICARE

## 2023-01-10 LAB — PSA SERPL-MCNC: 3.66 NG/ML (ref 0–4)

## 2023-01-10 PROCEDURE — 36415 COLL VENOUS BLD VENIPUNCTURE: CPT | Mod: GA

## 2023-01-10 PROCEDURE — 84153 ASSAY OF PSA TOTAL: CPT | Mod: GA

## 2023-05-03 ENCOUNTER — HOSPITAL ENCOUNTER (OUTPATIENT)
Dept: LAB | Facility: MEDICAL CENTER | Age: 83
End: 2023-05-03
Attending: INTERNAL MEDICINE
Payer: MEDICARE

## 2023-05-03 ENCOUNTER — HOSPITAL ENCOUNTER (OUTPATIENT)
Dept: LAB | Facility: MEDICAL CENTER | Age: 83
End: 2023-05-03
Attending: UROLOGY
Payer: MEDICARE

## 2023-05-03 LAB
ANION GAP SERPL CALC-SCNC: 10 MMOL/L (ref 7–16)
BUN SERPL-MCNC: 25 MG/DL (ref 8–22)
CALCIUM SERPL-MCNC: 8.8 MG/DL (ref 8.5–10.5)
CHLORIDE SERPL-SCNC: 105 MMOL/L (ref 96–112)
CO2 SERPL-SCNC: 24 MMOL/L (ref 20–33)
CREAT SERPL-MCNC: 1.68 MG/DL (ref 0.5–1.4)
GFR SERPLBLD CREATININE-BSD FMLA CKD-EPI: 40 ML/MIN/1.73 M 2
GLUCOSE SERPL-MCNC: 106 MG/DL (ref 65–99)
NT-PROBNP SERPL IA-MCNC: 350 PG/ML (ref 0–125)
POTASSIUM SERPL-SCNC: 6.1 MMOL/L (ref 3.6–5.5)
SODIUM SERPL-SCNC: 139 MMOL/L (ref 135–145)

## 2023-05-03 PROCEDURE — 83880 ASSAY OF NATRIURETIC PEPTIDE: CPT

## 2023-05-03 PROCEDURE — 80048 BASIC METABOLIC PNL TOTAL CA: CPT

## 2023-05-03 PROCEDURE — 84153 ASSAY OF PSA TOTAL: CPT

## 2023-05-03 PROCEDURE — 36415 COLL VENOUS BLD VENIPUNCTURE: CPT

## 2023-05-04 LAB — PSA SERPL-MCNC: 2.55 NG/ML (ref 0–4)

## 2023-05-28 ENCOUNTER — APPOINTMENT (OUTPATIENT)
Dept: RADIOLOGY | Facility: MEDICAL CENTER | Age: 83
End: 2023-05-28
Attending: EMERGENCY MEDICINE
Payer: MEDICARE

## 2023-05-28 ENCOUNTER — HOSPITAL ENCOUNTER (EMERGENCY)
Facility: MEDICAL CENTER | Age: 83
End: 2023-05-28
Attending: EMERGENCY MEDICINE
Payer: MEDICARE

## 2023-05-28 VITALS
OXYGEN SATURATION: 97 % | WEIGHT: 214 LBS | RESPIRATION RATE: 18 BRPM | BODY MASS INDEX: 32.43 KG/M2 | TEMPERATURE: 97.8 F | SYSTOLIC BLOOD PRESSURE: 158 MMHG | DIASTOLIC BLOOD PRESSURE: 76 MMHG | HEART RATE: 74 BPM | HEIGHT: 68 IN

## 2023-05-28 DIAGNOSIS — S42.91XB: ICD-10-CM

## 2023-05-28 PROCEDURE — 73030 X-RAY EXAM OF SHOULDER: CPT | Mod: RT

## 2023-05-28 PROCEDURE — 99284 EMERGENCY DEPT VISIT MOD MDM: CPT

## 2023-05-28 RX ORDER — DILTIAZEM HYDROCHLORIDE 180 MG/1
1 TABLET, EXTENDED RELEASE ORAL
COMMUNITY
Start: 2023-05-15

## 2023-05-28 RX ORDER — HYDROCODONE BITARTRATE AND ACETAMINOPHEN 5; 325 MG/1; MG/1
.5-1 TABLET ORAL EVERY 6 HOURS PRN
Qty: 15 TABLET | Refills: 0 | Status: SHIPPED | OUTPATIENT
Start: 2023-05-28 | End: 2023-06-04

## 2023-05-28 RX ORDER — LIDOCAINE HCL 4 %
10000 CREAM (GRAM) TOPICAL SEE ADMIN INSTRUCTIONS
COMMUNITY

## 2023-05-28 RX ORDER — CEFDINIR 300 MG/1
300 CAPSULE ORAL 2 TIMES DAILY
Status: SHIPPED | COMMUNITY
End: 2023-06-01

## 2023-05-28 ASSESSMENT — FIBROSIS 4 INDEX: FIB4 SCORE: 2.09

## 2023-05-28 NOTE — ED TRIAGE NOTES
"Chief Complaint   Patient presents with    Shoulder Injury     Pt fell onto right shoulder from 3 feet off ground, bruising to right shoulder with noted deformity   EMS gave through PIV in left hand  4 zofran   1 versed   35 Ketamine      BP (!) 174/96   Pulse 80   Resp 20   Ht 1.727 m (5' 8\")   Wt 97.1 kg (214 lb)   SpO2 97%   BMI 32.54 kg/m²     "

## 2023-05-28 NOTE — ED NOTES
Pt ambulated to restroom and back without complications. Pt states that he wants to go home. Pt provided with DC paper work and follow up care instructions. Pt educated on when to return, no questions. Pt used wheelchair to leave ER.

## 2023-05-28 NOTE — ED PROVIDER NOTES
ED Provider Note    CHIEF COMPLAINT  Chief Complaint   Patient presents with    Shoulder Injury     Pt fell onto right shoulder from 3 feet off ground, bruising to right shoulder with noted deformity   EMS gave through PIV in left hand  4 zofran   1 versed   35 Ketamine        EXTERNAL RECORDS REVIEWED  Inpatient Notes previous hospitalization notes and Outpatient Notes previous ER and outpatient notes    HPI/ROS  LIMITATION TO HISTORY   Select: : None      Donny Haas is a 83 y.o. male who presents for evaluation of fall.  The patient was working 3 feet off the ground on some cabinets when he fell landing on his right shoulder.  The patient presents here by EMS.  The patient was medicated prior to arrival.  Upon arrival here the patient only complains of right shoulder pain.  The patient denies: Head pain, neck pain, back pain, rib pain, difficulty breathing, abdominal pain, other extremity pain or trauma.  He denies recent illness.    PAST MEDICAL HISTORY   has a past medical history of Arrhythmia, Cataract, Hemorrhagic disorder (HCC), Hyperlipidemia, Hypertension, Psychiatric problem, Sleep apnea, and Thyroid disease.    SURGICAL HISTORY   has a past surgical history that includes dental surgery; tonsillectomy; hernia repair; cataract extraction with iol; and other orthopedic surgery.    FAMILY HISTORY  Family History   Problem Relation Age of Onset    Cancer Mother         breast cancer    Thyroid Sister         Hasimotos    Diabetes Sister         Type 2 DM    Thyroid Brother         Hasimotos    Diabetes Brother         Type 2 DM    No Known Problems Maternal Grandmother     Heart Attack Maternal Grandfather        SOCIAL HISTORY  Social History     Tobacco Use    Smoking status: Former     Packs/day: 1.00     Years: 19.00     Pack years: 19.00     Types: Cigarettes     Quit date: 3/22/1979     Years since quittin.2    Smokeless tobacco: Never   Vaping Use    Vaping Use: Never used  "  Substance and Sexual Activity    Alcohol use: Yes     Alcohol/week: 0.0 oz     Comment: 4 drinks a week.    Drug use: No    Sexual activity: Yes     Partners: Female       CURRENT MEDICATIONS  See nurses notes      ALLERGIES  Allergies   Allergen Reactions    Penicillins Rash     rash    Oxycodone-Acetaminophen Unspecified     Hallucinations per pt.     Penicillin G Rash and Unspecified    Latex      Allergy test       PHYSICAL EXAM  VITAL SIGNS: BP (!) 174/96   Pulse 80   Resp 20   Ht 1.727 m (5' 8\")   Wt 97.1 kg (214 lb)   SpO2 97%   BMI 32.54 kg/m²    Constitutional: 83-year-old male, awake, oriented x3  HENT: Normocephalic, Atraumatic, Nares:Clear, Oropharynx: moist, well hydrated, posterior pharynx:clear   Eyes: PERRL, EOMI, Conjunctiva normal, No discharge.   Neck: Normal range of motion, No tenderness, Supple, No stridor.   Lymphatic: No lymphadenopathy noted.   Cardiovascular: Regular rate and rhythm without mumurs, gallups, rubs   Thorax & Lungs: Normal Equal breath sounds, No respiratory distress, No wheezing, no stridor, no rales. No chest tenderness.   Abdomen: Soft, nontender, nondistended, no organomegaly, positive bowel sounds normal in quality. No guarding or rebound.  Skin: Good skin turgor, pink, warm, dry. No rashes, petechiae, purpura. Normal capillary refill.   Back: No tenderness, No CVA tenderness.   Extremities: Intact distal pulses, No edema, No tenderness, No cyanosis,  Vascular: Pulses are 2+, symmetric in the upper and lower extremities.  Musculoskeletal: Left upper extremity and lower extremities are atraumatic with full range of motion without discomfort; right upper extremity reveals contusion and hematoma over the anterior aspect of the shoulder and pain with any attempted range of motion; sensations intact over the deltoid region; elbow forearm and wrist are nontender;  Neurologic: Alert & oriented x 3,  No gross focal deficits noted.   Psychiatric: Affect normal, Judgment " normal, Mood normal.       DIAGNOSTIC STUDIES / PROCEDURES      RADIOLOGY  I have independently interpreted the diagnostic imaging associated with this visit and am waiting the final reading from the radiologist.   My preliminary interpretation is as follows: Right proximal humeral neck fracture, but no obvious dislocation;    Radiologist interpretation:   DX-SHOULDER 2+ RIGHT   Final Result      Acute, comminuted fracture of the surgical humeral neck. Anterior displacement. No dislocation.              COURSE & MEDICAL DECISION MAKING        INITIAL ASSESSMENT, COURSE AND PLAN  Care Narrative: This time, the patient presents for evaluation after fall 3 feet.  The patient has isolated right shoulder injury.  Imaging studies show a fracture of the humeral neck.  I did speak with orthopedic surgery, Dr. Lizarraga.  At this time, the patient is stable for discharge.  He was placed in a shoulder immobilizer and was given a prescription for Lortab for pain.  I discussed the findings and treatment plan with he and his wife.  They indicate that they are comfortable with this explanation and disposition.        ADDITIONAL PROBLEM LIST    DISPOSITION AND DISCUSSIONS  I have discussed management of the patient with the following physicians and RAFIA's: Orthopedic surgery on-call, Dr. Haji;      Escalation of care considered, and ultimately not performed:acute inpatient care management, however at this time, the patient is most appropriate for outpatient management      Decision tools and prescription drugs considered including, but not limited to: Pain Medications patient is on Eliquis and therefore because of the high risk for GI bleed he was now prescribed an NSAID and will be placed on Lortab. .    PLAN:  1.  Appropriate discharge instructions given  2.  Lortab 5 mg #15;  databank reviewed; informed consent obtained      FINAL DIAGNOSIS  1. Open fracture of right shoulder, initial encounter             Electronically  signed by: Guy G Gansert, M.D., 5/28/2023 3:13 PM

## 2023-05-28 NOTE — ED NOTES
Med rec updated and complete, per pt   Allergies reviewed, per pt  Interviewed pt with wife at bedside with permission from pt.  Pt had a list of medications on his phone, went over list of medications and returned pts phone back to pts wife at bedside.

## 2023-05-31 ENCOUNTER — APPOINTMENT (OUTPATIENT)
Dept: ADMISSIONS | Facility: MEDICAL CENTER | Age: 83
End: 2023-05-31
Attending: ORTHOPAEDIC SURGERY
Payer: MEDICARE

## 2023-06-01 ENCOUNTER — PRE-ADMISSION TESTING (OUTPATIENT)
Dept: ADMISSIONS | Facility: MEDICAL CENTER | Age: 83
End: 2023-06-01
Attending: ORTHOPAEDIC SURGERY
Payer: MEDICARE

## 2023-06-01 VITALS — HEIGHT: 69 IN | BODY MASS INDEX: 32.07 KG/M2

## 2023-06-01 DIAGNOSIS — Z01.812 PRE-OPERATIVE LABORATORY EXAMINATION: ICD-10-CM

## 2023-06-01 LAB
ALBUMIN SERPL BCP-MCNC: 3.6 G/DL (ref 3.2–4.9)
ALBUMIN/GLOB SERPL: 1.5 G/DL
ALP SERPL-CCNC: 74 U/L (ref 30–99)
ALT SERPL-CCNC: 13 U/L (ref 2–50)
ANION GAP SERPL CALC-SCNC: 11 MMOL/L (ref 7–16)
AST SERPL-CCNC: 15 U/L (ref 12–45)
BASOPHILS # BLD AUTO: 0.2 % (ref 0–1.8)
BASOPHILS # BLD: 0.01 K/UL (ref 0–0.12)
BILIRUB SERPL-MCNC: 0.6 MG/DL (ref 0.1–1.5)
BUN SERPL-MCNC: 26 MG/DL (ref 8–22)
CALCIUM ALBUM COR SERPL-MCNC: 9 MG/DL (ref 8.5–10.5)
CALCIUM SERPL-MCNC: 8.7 MG/DL (ref 8.4–10.2)
CHLORIDE SERPL-SCNC: 95 MMOL/L (ref 96–112)
CO2 SERPL-SCNC: 22 MMOL/L (ref 20–33)
CREAT SERPL-MCNC: 1.28 MG/DL (ref 0.5–1.4)
EOSINOPHIL # BLD AUTO: 0.03 K/UL (ref 0–0.51)
EOSINOPHIL NFR BLD: 0.5 % (ref 0–6.9)
ERYTHROCYTE [DISTWIDTH] IN BLOOD BY AUTOMATED COUNT: 45.1 FL (ref 35.9–50)
GFR SERPLBLD CREATININE-BSD FMLA CKD-EPI: 56 ML/MIN/1.73 M 2
GLOBULIN SER CALC-MCNC: 2.4 G/DL (ref 1.9–3.5)
GLUCOSE SERPL-MCNC: 115 MG/DL (ref 65–99)
HCT VFR BLD AUTO: 30.2 % (ref 42–52)
HGB BLD-MCNC: 10.1 G/DL (ref 14–18)
IMM GRANULOCYTES # BLD AUTO: 0.05 K/UL (ref 0–0.11)
IMM GRANULOCYTES NFR BLD AUTO: 0.8 % (ref 0–0.9)
LYMPHOCYTES # BLD AUTO: 0.73 K/UL (ref 1–4.8)
LYMPHOCYTES NFR BLD: 11.4 % (ref 22–41)
MCH RBC QN AUTO: 31.4 PG (ref 27–33)
MCHC RBC AUTO-ENTMCNC: 33.4 G/DL (ref 32.3–36.5)
MCV RBC AUTO: 93.8 FL (ref 81.4–97.8)
MONOCYTES # BLD AUTO: 0.83 K/UL (ref 0–0.85)
MONOCYTES NFR BLD AUTO: 13 % (ref 0–13.4)
NEUTROPHILS # BLD AUTO: 4.73 K/UL (ref 1.82–7.42)
NEUTROPHILS NFR BLD: 74.1 % (ref 44–72)
NRBC # BLD AUTO: 0 K/UL
NRBC BLD-RTO: 0 /100 WBC (ref 0–0.2)
PLATELET # BLD AUTO: 188 K/UL (ref 164–446)
PMV BLD AUTO: 10.4 FL (ref 9–12.9)
POTASSIUM SERPL-SCNC: 4.3 MMOL/L (ref 3.6–5.5)
PROT SERPL-MCNC: 6 G/DL (ref 6–8.2)
RBC # BLD AUTO: 3.22 M/UL (ref 4.7–6.1)
SCCMEC + MECA PNL NOSE NAA+PROBE: NEGATIVE
SCCMEC + MECA PNL NOSE NAA+PROBE: NEGATIVE
SODIUM SERPL-SCNC: 128 MMOL/L (ref 135–145)
WBC # BLD AUTO: 6.4 K/UL (ref 4.8–10.8)

## 2023-06-01 PROCEDURE — 93005 ELECTROCARDIOGRAM TRACING: CPT

## 2023-06-01 PROCEDURE — 87641 MR-STAPH DNA AMP PROBE: CPT

## 2023-06-01 PROCEDURE — 87640 STAPH A DNA AMP PROBE: CPT | Mod: XU

## 2023-06-01 PROCEDURE — 36415 COLL VENOUS BLD VENIPUNCTURE: CPT

## 2023-06-01 PROCEDURE — 85025 COMPLETE CBC W/AUTO DIFF WBC: CPT

## 2023-06-01 PROCEDURE — 80053 COMPREHEN METABOLIC PANEL: CPT

## 2023-06-01 NOTE — OR NURSING
PreAdmit Appointment: Reviewed Preparing for your procedure handout via phone. Patient instructed to continue regularly prescribed medications through day before surgery. Instructed to take the following medications the day of surgery with a sip of water per Anesthesia protocol: Prozac, Synthroid, Matzim, Terazosin, Norco as needed. Pt states he was instructed to stop his Eliquis 48hrs prior to surgery. Pt instructed to bring CPAP DOS.    Scheduled Lab testing and  appt at 2pm today. Pt to get CHG scrub kit and instructions from Anni  today.

## 2023-06-02 LAB — EKG IMPRESSION: NORMAL

## 2023-06-02 PROCEDURE — 93010 ELECTROCARDIOGRAM REPORT: CPT | Performed by: INTERNAL MEDICINE

## 2023-06-05 ENCOUNTER — HOSPITAL ENCOUNTER (OUTPATIENT)
Dept: LAB | Facility: MEDICAL CENTER | Age: 83
End: 2023-06-05
Attending: ORTHOPAEDIC SURGERY
Payer: MEDICARE

## 2023-06-05 LAB
ANION GAP SERPL CALC-SCNC: 9 MMOL/L (ref 7–16)
BUN SERPL-MCNC: 19 MG/DL (ref 8–22)
CALCIUM SERPL-MCNC: 9 MG/DL (ref 8.5–10.5)
CHLORIDE SERPL-SCNC: 101 MMOL/L (ref 96–112)
CO2 SERPL-SCNC: 27 MMOL/L (ref 20–33)
CREAT SERPL-MCNC: 1.28 MG/DL (ref 0.5–1.4)
GFR SERPLBLD CREATININE-BSD FMLA CKD-EPI: 56 ML/MIN/1.73 M 2
GLUCOSE SERPL-MCNC: 98 MG/DL (ref 65–99)
POTASSIUM SERPL-SCNC: 5.6 MMOL/L (ref 3.6–5.5)
SODIUM SERPL-SCNC: 137 MMOL/L (ref 135–145)

## 2023-06-05 PROCEDURE — 36415 COLL VENOUS BLD VENIPUNCTURE: CPT

## 2023-06-05 PROCEDURE — 80048 BASIC METABOLIC PNL TOTAL CA: CPT

## 2023-06-09 ENCOUNTER — HOSPITAL ENCOUNTER (OUTPATIENT)
Facility: MEDICAL CENTER | Age: 83
End: 2023-06-10
Attending: ORTHOPAEDIC SURGERY | Admitting: ORTHOPAEDIC SURGERY
Payer: MEDICARE

## 2023-06-09 ENCOUNTER — APPOINTMENT (OUTPATIENT)
Dept: RADIOLOGY | Facility: MEDICAL CENTER | Age: 83
End: 2023-06-09
Attending: ORTHOPAEDIC SURGERY
Payer: MEDICARE

## 2023-06-09 ENCOUNTER — APPOINTMENT (OUTPATIENT)
Dept: RADIOLOGY | Facility: MEDICAL CENTER | Age: 83
End: 2023-06-09
Attending: HOSPITALIST
Payer: MEDICARE

## 2023-06-09 ENCOUNTER — ANESTHESIA EVENT (OUTPATIENT)
Dept: SURGERY | Facility: MEDICAL CENTER | Age: 83
End: 2023-06-09
Payer: MEDICARE

## 2023-06-09 ENCOUNTER — ANESTHESIA (OUTPATIENT)
Dept: SURGERY | Facility: MEDICAL CENTER | Age: 83
End: 2023-06-09
Payer: MEDICARE

## 2023-06-09 DIAGNOSIS — S42.291D OTHER CLOSED DISPLACED FRACTURE OF PROXIMAL END OF RIGHT HUMERUS WITH ROUTINE HEALING, SUBSEQUENT ENCOUNTER: ICD-10-CM

## 2023-06-09 DIAGNOSIS — S42.201D CLOSED FRACTURE OF PROXIMAL END OF RIGHT HUMERUS WITH ROUTINE HEALING, UNSPECIFIED FRACTURE MORPHOLOGY, SUBSEQUENT ENCOUNTER: ICD-10-CM

## 2023-06-09 LAB
ALBUMIN SERPL BCP-MCNC: 3.6 G/DL (ref 3.2–4.9)
ALBUMIN/GLOB SERPL: 1.6 G/DL
ALP SERPL-CCNC: 110 U/L (ref 30–99)
ALT SERPL-CCNC: 20 U/L (ref 2–50)
ANION GAP SERPL CALC-SCNC: 11 MMOL/L (ref 7–16)
AST SERPL-CCNC: 20 U/L (ref 12–45)
BASOPHILS # BLD AUTO: 0 % (ref 0–1.8)
BASOPHILS # BLD: 0 K/UL (ref 0–0.12)
BILIRUB SERPL-MCNC: 0.4 MG/DL (ref 0.1–1.5)
BUN SERPL-MCNC: 26 MG/DL (ref 8–22)
CALCIUM ALBUM COR SERPL-MCNC: 8.9 MG/DL (ref 8.5–10.5)
CALCIUM SERPL-MCNC: 8.6 MG/DL (ref 8.4–10.2)
CHLORIDE SERPL-SCNC: 98 MMOL/L (ref 96–112)
CO2 SERPL-SCNC: 21 MMOL/L (ref 20–33)
CREAT SERPL-MCNC: 1.39 MG/DL (ref 0.5–1.4)
EOSINOPHIL # BLD AUTO: 0 K/UL (ref 0–0.51)
EOSINOPHIL NFR BLD: 0 % (ref 0–6.9)
ERYTHROCYTE [DISTWIDTH] IN BLOOD BY AUTOMATED COUNT: 51.7 FL (ref 35.9–50)
GFR SERPLBLD CREATININE-BSD FMLA CKD-EPI: 51 ML/MIN/1.73 M 2
GLOBULIN SER CALC-MCNC: 2.3 G/DL (ref 1.9–3.5)
GLUCOSE SERPL-MCNC: 181 MG/DL (ref 65–99)
HCT VFR BLD AUTO: 30.8 % (ref 42–52)
HGB BLD-MCNC: 9.6 G/DL (ref 14–18)
IMM GRANULOCYTES # BLD AUTO: 0.03 K/UL (ref 0–0.11)
IMM GRANULOCYTES NFR BLD AUTO: 0.4 % (ref 0–0.9)
LYMPHOCYTES # BLD AUTO: 0.2 K/UL (ref 1–4.8)
LYMPHOCYTES NFR BLD: 2.6 % (ref 22–41)
MCH RBC QN AUTO: 31.5 PG (ref 27–33)
MCHC RBC AUTO-ENTMCNC: 31.2 G/DL (ref 32.3–36.5)
MCV RBC AUTO: 101 FL (ref 81.4–97.8)
MONOCYTES # BLD AUTO: 0.33 K/UL (ref 0–0.85)
MONOCYTES NFR BLD AUTO: 4.3 % (ref 0–13.4)
NEUTROPHILS # BLD AUTO: 7.19 K/UL (ref 1.82–7.42)
NEUTROPHILS NFR BLD: 92.7 % (ref 44–72)
NRBC # BLD AUTO: 0 K/UL
NRBC BLD-RTO: 0 /100 WBC (ref 0–0.2)
NT-PROBNP SERPL IA-MCNC: 570 PG/ML (ref 0–125)
PLATELET # BLD AUTO: 205 K/UL (ref 164–446)
PMV BLD AUTO: 9.7 FL (ref 9–12.9)
POTASSIUM SERPL-SCNC: 4.9 MMOL/L (ref 3.6–5.5)
PROT SERPL-MCNC: 5.9 G/DL (ref 6–8.2)
RBC # BLD AUTO: 3.05 M/UL (ref 4.7–6.1)
SODIUM SERPL-SCNC: 130 MMOL/L (ref 135–145)
TROPONIN T SERPL-MCNC: 36 NG/L (ref 6–19)
TROPONIN T SERPL-MCNC: 37 NG/L (ref 6–19)
WBC # BLD AUTO: 7.8 K/UL (ref 4.8–10.8)

## 2023-06-09 PROCEDURE — 160029 HCHG SURGERY MINUTES - 1ST 30 MINS LEVEL 4: Performed by: ORTHOPAEDIC SURGERY

## 2023-06-09 PROCEDURE — 160048 HCHG OR STATISTICAL LEVEL 1-5: Performed by: ORTHOPAEDIC SURGERY

## 2023-06-09 PROCEDURE — 160047 HCHG PACU  - EA ADDL 30 MINS PHASE II: Performed by: ORTHOPAEDIC SURGERY

## 2023-06-09 PROCEDURE — 160036 HCHG PACU - EA ADDL 30 MINS PHASE I: Performed by: ORTHOPAEDIC SURGERY

## 2023-06-09 PROCEDURE — 160046 HCHG PACU - 1ST 60 MINS PHASE II: Performed by: ORTHOPAEDIC SURGERY

## 2023-06-09 PROCEDURE — 700102 HCHG RX REV CODE 250 W/ 637 OVERRIDE(OP): Performed by: HOSPITALIST

## 2023-06-09 PROCEDURE — 97535 SELF CARE MNGMENT TRAINING: CPT

## 2023-06-09 PROCEDURE — 99100 ANES PT EXTEME AGE<1 YR&>70: CPT | Performed by: ANESTHESIOLOGY

## 2023-06-09 PROCEDURE — 160035 HCHG PACU - 1ST 60 MINS PHASE I: Performed by: ORTHOPAEDIC SURGERY

## 2023-06-09 PROCEDURE — 94760 N-INVAS EAR/PLS OXIMETRY 1: CPT

## 2023-06-09 PROCEDURE — 73020 X-RAY EXAM OF SHOULDER: CPT | Mod: RT

## 2023-06-09 PROCEDURE — A9270 NON-COVERED ITEM OR SERVICE: HCPCS | Performed by: ANESTHESIOLOGY

## 2023-06-09 PROCEDURE — 700111 HCHG RX REV CODE 636 W/ 250 OVERRIDE (IP): Performed by: ANESTHESIOLOGY

## 2023-06-09 PROCEDURE — 700102 HCHG RX REV CODE 250 W/ 637 OVERRIDE(OP): Performed by: ANESTHESIOLOGY

## 2023-06-09 PROCEDURE — 64415 NJX AA&/STRD BRCH PLXS IMG: CPT | Mod: 59,RT | Performed by: ANESTHESIOLOGY

## 2023-06-09 PROCEDURE — 99204 OFFICE O/P NEW MOD 45 MIN: CPT | Performed by: HOSPITALIST

## 2023-06-09 PROCEDURE — 160002 HCHG RECOVERY MINUTES (STAT): Performed by: ORTHOPAEDIC SURGERY

## 2023-06-09 PROCEDURE — 700101 HCHG RX REV CODE 250: Performed by: ANESTHESIOLOGY

## 2023-06-09 PROCEDURE — 01638 ANES OPN/ARTHR TOT SHO RPLCM: CPT | Performed by: ANESTHESIOLOGY

## 2023-06-09 PROCEDURE — 160025 RECOVERY II MINUTES (STATS): Performed by: ORTHOPAEDIC SURGERY

## 2023-06-09 PROCEDURE — 700105 HCHG RX REV CODE 258: Performed by: ORTHOPAEDIC SURGERY

## 2023-06-09 PROCEDURE — 700105 HCHG RX REV CODE 258: Performed by: ANESTHESIOLOGY

## 2023-06-09 PROCEDURE — C1776 JOINT DEVICE (IMPLANTABLE): HCPCS | Performed by: ORTHOPAEDIC SURGERY

## 2023-06-09 PROCEDURE — 93005 ELECTROCARDIOGRAM TRACING: CPT | Performed by: HOSPITALIST

## 2023-06-09 PROCEDURE — 160041 HCHG SURGERY MINUTES - EA ADDL 1 MIN LEVEL 4: Performed by: ORTHOPAEDIC SURGERY

## 2023-06-09 PROCEDURE — 64415 NJX AA&/STRD BRCH PLXS IMG: CPT | Performed by: ORTHOPAEDIC SURGERY

## 2023-06-09 PROCEDURE — C1713 ANCHOR/SCREW BN/BN,TIS/BN: HCPCS | Performed by: ORTHOPAEDIC SURGERY

## 2023-06-09 PROCEDURE — 84484 ASSAY OF TROPONIN QUANT: CPT

## 2023-06-09 PROCEDURE — 110371 HCHG SHELL REV 272: Performed by: ORTHOPAEDIC SURGERY

## 2023-06-09 PROCEDURE — 36415 COLL VENOUS BLD VENIPUNCTURE: CPT

## 2023-06-09 PROCEDURE — C9290 INJ, BUPIVACAINE LIPOSOME: HCPCS | Performed by: ANESTHESIOLOGY

## 2023-06-09 PROCEDURE — 85025 COMPLETE CBC W/AUTO DIFF WBC: CPT

## 2023-06-09 PROCEDURE — G0378 HOSPITAL OBSERVATION PER HR: HCPCS

## 2023-06-09 PROCEDURE — 83880 ASSAY OF NATRIURETIC PEPTIDE: CPT

## 2023-06-09 PROCEDURE — 97165 OT EVAL LOW COMPLEX 30 MIN: CPT

## 2023-06-09 PROCEDURE — 160009 HCHG ANES TIME/MIN: Performed by: ORTHOPAEDIC SURGERY

## 2023-06-09 PROCEDURE — A9270 NON-COVERED ITEM OR SERVICE: HCPCS | Performed by: HOSPITALIST

## 2023-06-09 PROCEDURE — 80053 COMPREHEN METABOLIC PANEL: CPT

## 2023-06-09 PROCEDURE — 700101 HCHG RX REV CODE 250: Performed by: ORTHOPAEDIC SURGERY

## 2023-06-09 PROCEDURE — 502000 HCHG MISC OR IMPLANTS RC 0278: Performed by: ORTHOPAEDIC SURGERY

## 2023-06-09 PROCEDURE — 71045 X-RAY EXAM CHEST 1 VIEW: CPT

## 2023-06-09 DEVICE — IMPLANTABLE DEVICE: Type: IMPLANTABLE DEVICE | Status: FUNCTIONAL

## 2023-06-09 DEVICE — SCREW BONE SELF TAPPING 6.5MM X 25MM (1EA): Type: IMPLANTABLE DEVICE | Status: FUNCTIONAL

## 2023-06-09 DEVICE — HUMERAL BODY SMR SHOULDER WITH LOCKING SCREW (1EA): Type: IMPLANTABLE DEVICE | Status: FUNCTIONAL

## 2023-06-09 DEVICE — POWDER SURGICAL 5GM COLLAGEN CELLERATE RX (1EA): Type: IMPLANTABLE DEVICE | Status: FUNCTIONAL

## 2023-06-09 RX ORDER — MIDAZOLAM HYDROCHLORIDE 1 MG/ML
INJECTION INTRAMUSCULAR; INTRAVENOUS PRN
Status: DISCONTINUED | OUTPATIENT
Start: 2023-06-09 | End: 2023-06-09 | Stop reason: SURG

## 2023-06-09 RX ORDER — DILTIAZEM HYDROCHLORIDE 180 MG/1
180 CAPSULE, COATED, EXTENDED RELEASE ORAL EVERY EVENING
Status: DISCONTINUED | OUTPATIENT
Start: 2023-06-09 | End: 2023-06-10 | Stop reason: HOSPADM

## 2023-06-09 RX ORDER — FINASTERIDE 5 MG/1
5 TABLET, FILM COATED ORAL EVERY EVENING
Status: DISCONTINUED | OUTPATIENT
Start: 2023-06-09 | End: 2023-06-10 | Stop reason: HOSPADM

## 2023-06-09 RX ORDER — HYDROMORPHONE HYDROCHLORIDE 1 MG/ML
0.2 INJECTION, SOLUTION INTRAMUSCULAR; INTRAVENOUS; SUBCUTANEOUS
Status: DISCONTINUED | OUTPATIENT
Start: 2023-06-09 | End: 2023-06-09 | Stop reason: HOSPADM

## 2023-06-09 RX ORDER — LOSARTAN POTASSIUM 50 MG/1
50 TABLET ORAL 2 TIMES DAILY
Status: DISCONTINUED | OUTPATIENT
Start: 2023-06-09 | End: 2023-06-09

## 2023-06-09 RX ORDER — BUPIVACAINE HYDROCHLORIDE 2.5 MG/ML
INJECTION, SOLUTION EPIDURAL; INFILTRATION; INTRACAUDAL
Status: COMPLETED | OUTPATIENT
Start: 2023-06-09 | End: 2023-06-09

## 2023-06-09 RX ORDER — OXYCODONE HCL 5 MG/5 ML
10 SOLUTION, ORAL ORAL
Status: COMPLETED | OUTPATIENT
Start: 2023-06-09 | End: 2023-06-09

## 2023-06-09 RX ORDER — BISACODYL 10 MG
10 SUPPOSITORY, RECTAL RECTAL
Status: DISCONTINUED | OUTPATIENT
Start: 2023-06-09 | End: 2023-06-10 | Stop reason: HOSPADM

## 2023-06-09 RX ORDER — LOSARTAN POTASSIUM 25 MG/1
50 TABLET ORAL 2 TIMES DAILY
Status: DISCONTINUED | OUTPATIENT
Start: 2023-06-09 | End: 2023-06-10 | Stop reason: HOSPADM

## 2023-06-09 RX ORDER — SODIUM CHLORIDE, SODIUM LACTATE, POTASSIUM CHLORIDE, CALCIUM CHLORIDE 600; 310; 30; 20 MG/100ML; MG/100ML; MG/100ML; MG/100ML
INJECTION, SOLUTION INTRAVENOUS
Status: DISCONTINUED | OUTPATIENT
Start: 2023-06-09 | End: 2023-06-09 | Stop reason: SURG

## 2023-06-09 RX ORDER — SODIUM CHLORIDE, SODIUM GLUCONATE, SODIUM ACETATE, POTASSIUM CHLORIDE AND MAGNESIUM CHLORIDE 526; 502; 368; 37; 30 MG/100ML; MG/100ML; MG/100ML; MG/100ML; MG/100ML
500 INJECTION, SOLUTION INTRAVENOUS CONTINUOUS
Status: DISCONTINUED | OUTPATIENT
Start: 2023-06-09 | End: 2023-06-09 | Stop reason: HOSPADM

## 2023-06-09 RX ORDER — DIPHENHYDRAMINE HYDROCHLORIDE 50 MG/ML
12.5 INJECTION INTRAMUSCULAR; INTRAVENOUS
Status: DISCONTINUED | OUTPATIENT
Start: 2023-06-09 | End: 2023-06-09 | Stop reason: HOSPADM

## 2023-06-09 RX ORDER — HALOPERIDOL 5 MG/ML
1 INJECTION INTRAMUSCULAR
Status: DISCONTINUED | OUTPATIENT
Start: 2023-06-09 | End: 2023-06-09 | Stop reason: HOSPADM

## 2023-06-09 RX ORDER — TERAZOSIN 5 MG/1
5 CAPSULE ORAL NIGHTLY
Status: DISCONTINUED | OUTPATIENT
Start: 2023-06-09 | End: 2023-06-10 | Stop reason: HOSPADM

## 2023-06-09 RX ORDER — DEXAMETHASONE SODIUM PHOSPHATE 4 MG/ML
INJECTION, SOLUTION INTRA-ARTICULAR; INTRALESIONAL; INTRAMUSCULAR; INTRAVENOUS; SOFT TISSUE PRN
Status: DISCONTINUED | OUTPATIENT
Start: 2023-06-09 | End: 2023-06-09 | Stop reason: SURG

## 2023-06-09 RX ORDER — HYDROMORPHONE HYDROCHLORIDE 1 MG/ML
0.4 INJECTION, SOLUTION INTRAMUSCULAR; INTRAVENOUS; SUBCUTANEOUS
Status: DISCONTINUED | OUTPATIENT
Start: 2023-06-09 | End: 2023-06-09 | Stop reason: HOSPADM

## 2023-06-09 RX ORDER — LEVOTHYROXINE SODIUM 112 UG/1
112 TABLET ORAL
Status: DISCONTINUED | OUTPATIENT
Start: 2023-06-09 | End: 2023-06-10 | Stop reason: HOSPADM

## 2023-06-09 RX ORDER — AMOXICILLIN 250 MG
2 CAPSULE ORAL 2 TIMES DAILY
Status: DISCONTINUED | OUTPATIENT
Start: 2023-06-09 | End: 2023-06-10 | Stop reason: HOSPADM

## 2023-06-09 RX ORDER — ATORVASTATIN CALCIUM 20 MG/1
20 TABLET, FILM COATED ORAL
Status: DISCONTINUED | OUTPATIENT
Start: 2023-06-09 | End: 2023-06-10 | Stop reason: HOSPADM

## 2023-06-09 RX ORDER — SODIUM CHLORIDE, SODIUM LACTATE, POTASSIUM CHLORIDE, CALCIUM CHLORIDE 600; 310; 30; 20 MG/100ML; MG/100ML; MG/100ML; MG/100ML
INJECTION, SOLUTION INTRAVENOUS CONTINUOUS
Status: ACTIVE | OUTPATIENT
Start: 2023-06-09 | End: 2023-06-09

## 2023-06-09 RX ORDER — CEFAZOLIN SODIUM 1 G/3ML
INJECTION, POWDER, FOR SOLUTION INTRAMUSCULAR; INTRAVENOUS PRN
Status: DISCONTINUED | OUTPATIENT
Start: 2023-06-09 | End: 2023-06-09 | Stop reason: SURG

## 2023-06-09 RX ORDER — OXYCODONE HCL 5 MG/5 ML
5 SOLUTION, ORAL ORAL
Status: COMPLETED | OUTPATIENT
Start: 2023-06-09 | End: 2023-06-09

## 2023-06-09 RX ORDER — ROCURONIUM BROMIDE 10 MG/ML
INJECTION, SOLUTION INTRAVENOUS PRN
Status: DISCONTINUED | OUTPATIENT
Start: 2023-06-09 | End: 2023-06-09 | Stop reason: SURG

## 2023-06-09 RX ORDER — ONDANSETRON 2 MG/ML
4 INJECTION INTRAMUSCULAR; INTRAVENOUS
Status: COMPLETED | OUTPATIENT
Start: 2023-06-09 | End: 2023-06-09

## 2023-06-09 RX ORDER — FLUOXETINE HYDROCHLORIDE 20 MG/1
20 CAPSULE ORAL EVERY MORNING
Status: DISCONTINUED | OUTPATIENT
Start: 2023-06-10 | End: 2023-06-10 | Stop reason: HOSPADM

## 2023-06-09 RX ORDER — DILTIAZEM HYDROCHLORIDE EXTENDED-RELEASE TABLETS 180 MG/1
1 TABLET, EXTENDED RELEASE ORAL
Status: DISCONTINUED | OUTPATIENT
Start: 2023-06-09 | End: 2023-06-09

## 2023-06-09 RX ORDER — ONDANSETRON 2 MG/ML
INJECTION INTRAMUSCULAR; INTRAVENOUS PRN
Status: DISCONTINUED | OUTPATIENT
Start: 2023-06-09 | End: 2023-06-09 | Stop reason: SURG

## 2023-06-09 RX ORDER — HYDROMORPHONE HYDROCHLORIDE 1 MG/ML
0.6 INJECTION, SOLUTION INTRAMUSCULAR; INTRAVENOUS; SUBCUTANEOUS
Status: DISCONTINUED | OUTPATIENT
Start: 2023-06-09 | End: 2023-06-09 | Stop reason: HOSPADM

## 2023-06-09 RX ORDER — BUPIVACAINE HYDROCHLORIDE AND EPINEPHRINE 5; 5 MG/ML; UG/ML
INJECTION, SOLUTION PERINEURAL
Status: DISCONTINUED | OUTPATIENT
Start: 2023-06-09 | End: 2023-06-09 | Stop reason: HOSPADM

## 2023-06-09 RX ORDER — TRANEXAMIC ACID 100 MG/ML
INJECTION, SOLUTION INTRAVENOUS PRN
Status: DISCONTINUED | OUTPATIENT
Start: 2023-06-09 | End: 2023-06-09 | Stop reason: SURG

## 2023-06-09 RX ADMIN — BUPIVACAINE 10 ML: 13.3 INJECTION, SUSPENSION, LIPOSOMAL INFILTRATION at 09:18

## 2023-06-09 RX ADMIN — FINASTERIDE 5 MG: 5 TABLET, FILM COATED ORAL at 18:12

## 2023-06-09 RX ADMIN — BUPIVACAINE HYDROCHLORIDE 10 ML: 2.5 INJECTION, SOLUTION EPIDURAL; INFILTRATION; INTRACAUDAL at 09:00

## 2023-06-09 RX ADMIN — DEXAMETHASONE SODIUM PHOSPHATE 4 MG: 4 INJECTION INTRA-ARTICULAR; INTRALESIONAL; INTRAMUSCULAR; INTRAVENOUS; SOFT TISSUE at 10:47

## 2023-06-09 RX ADMIN — CEFAZOLIN 2 G: 1 INJECTION, POWDER, FOR SOLUTION INTRAMUSCULAR; INTRAVENOUS at 09:35

## 2023-06-09 RX ADMIN — TRANEXAMIC ACID 1000 MG: 100 INJECTION, SOLUTION INTRAVENOUS at 09:44

## 2023-06-09 RX ADMIN — ATORVASTATIN CALCIUM 20 MG: 20 TABLET, FILM COATED ORAL at 18:11

## 2023-06-09 RX ADMIN — FENTANYL CITRATE 50 MCG: 50 INJECTION, SOLUTION INTRAMUSCULAR; INTRAVENOUS at 10:01

## 2023-06-09 RX ADMIN — SUGAMMADEX 200 MG: 100 INJECTION, SOLUTION INTRAVENOUS at 10:01

## 2023-06-09 RX ADMIN — MIDAZOLAM 2 MG: 1 INJECTION, SOLUTION INTRAMUSCULAR; INTRAVENOUS at 09:27

## 2023-06-09 RX ADMIN — ONDANSETRON 4 MG: 2 INJECTION INTRAMUSCULAR; INTRAVENOUS at 11:30

## 2023-06-09 RX ADMIN — FENTANYL CITRATE 50 MCG: 50 INJECTION, SOLUTION INTRAMUSCULAR; INTRAVENOUS at 09:29

## 2023-06-09 RX ADMIN — SODIUM CHLORIDE, POTASSIUM CHLORIDE, SODIUM LACTATE AND CALCIUM CHLORIDE: 600; 310; 30; 20 INJECTION, SOLUTION INTRAVENOUS at 09:27

## 2023-06-09 RX ADMIN — FENTANYL CITRATE 25 MCG: 50 INJECTION, SOLUTION INTRAMUSCULAR; INTRAVENOUS at 11:41

## 2023-06-09 RX ADMIN — ONDANSETRON 4 MG: 2 INJECTION INTRAMUSCULAR; INTRAVENOUS at 10:46

## 2023-06-09 RX ADMIN — OXYCODONE HYDROCHLORIDE 5 MG: 5 SOLUTION ORAL at 11:41

## 2023-06-09 RX ADMIN — DILTIAZEM HYDROCHLORIDE 180 MG: 180 CAPSULE, COATED, EXTENDED RELEASE ORAL at 18:12

## 2023-06-09 RX ADMIN — ROCURONIUM BROMIDE 50 MG: 50 INJECTION, SOLUTION INTRAVENOUS at 09:31

## 2023-06-09 RX ADMIN — TRANEXAMIC ACID 1000 MG: 100 INJECTION, SOLUTION INTRAVENOUS at 16:36

## 2023-06-09 RX ADMIN — LEVOTHYROXINE SODIUM 112 MCG: 0.11 TABLET ORAL at 18:12

## 2023-06-09 RX ADMIN — TRANEXAMIC ACID 1000 MG: 100 INJECTION, SOLUTION INTRAVENOUS at 11:03

## 2023-06-09 RX ADMIN — PROPOFOL 150 MG: 10 INJECTION, EMULSION INTRAVENOUS at 09:30

## 2023-06-09 ASSESSMENT — LIFESTYLE VARIABLES
TOTAL SCORE: 0
AVERAGE NUMBER OF DAYS PER WEEK YOU HAVE A DRINK CONTAINING ALCOHOL: 1
EVER FELT BAD OR GUILTY ABOUT YOUR DRINKING: NO
CONSUMPTION TOTAL: NEGATIVE
ON A TYPICAL DAY WHEN YOU DRINK ALCOHOL HOW MANY DRINKS DO YOU HAVE: 1
TOTAL SCORE: 0
HAVE YOU EVER FELT YOU SHOULD CUT DOWN ON YOUR DRINKING: NO
ALCOHOL_USE: YES
HOW MANY TIMES IN THE PAST YEAR HAVE YOU HAD 5 OR MORE DRINKS IN A DAY: 0
TOTAL SCORE: 0
HAVE PEOPLE ANNOYED YOU BY CRITICIZING YOUR DRINKING: NO
EVER HAD A DRINK FIRST THING IN THE MORNING TO STEADY YOUR NERVES TO GET RID OF A HANGOVER: NO

## 2023-06-09 ASSESSMENT — ENCOUNTER SYMPTOMS
COUGH: 0
EYE DISCHARGE: 0
NERVOUS/ANXIOUS: 0
STRIDOR: 0
FLANK PAIN: 0
CHILLS: 0
FOCAL WEAKNESS: 0
MYALGIAS: 0
FEVER: 0
VOMITING: 0
SHORTNESS OF BREATH: 0
ABDOMINAL PAIN: 0
BRUISES/BLEEDS EASILY: 0
EYE REDNESS: 0

## 2023-06-09 ASSESSMENT — GAIT ASSESSMENTS: DISTANCE (FEET): 100

## 2023-06-09 ASSESSMENT — FIBROSIS 4 INDEX: FIB4 SCORE: 1.81

## 2023-06-09 ASSESSMENT — PAIN SCALES - GENERAL: PAIN_LEVEL: 1

## 2023-06-09 ASSESSMENT — COPD QUESTIONNAIRES
DURING THE PAST 4 WEEKS HOW MUCH DID YOU FEEL SHORT OF BREATH: NONE/LITTLE OF THE TIME
HAVE YOU SMOKED AT LEAST 100 CIGARETTES IN YOUR ENTIRE LIFE: YES
DO YOU EVER COUGH UP ANY MUCUS OR PHLEGM?: YES, A FEW DAYS A WEEK OR MONTH
COPD SCREENING SCORE: 6

## 2023-06-09 ASSESSMENT — COGNITIVE AND FUNCTIONAL STATUS - GENERAL
HELP NEEDED FOR BATHING: A LITTLE
PERSONAL GROOMING: A LITTLE
STANDING UP FROM CHAIR USING ARMS: A LITTLE
DRESSING REGULAR LOWER BODY CLOTHING: A LITTLE
DAILY ACTIVITIY SCORE: 18
WALKING IN HOSPITAL ROOM: A LITTLE
HELP NEEDED FOR BATHING: A LITTLE
TOILETING: A LITTLE
SUGGESTED CMS G CODE MODIFIER MOBILITY: CK
DRESSING REGULAR UPPER BODY CLOTHING: A LOT
MOBILITY SCORE: 18
MOVING FROM LYING ON BACK TO SITTING ON SIDE OF FLAT BED: A LITTLE
MOVING TO AND FROM BED TO CHAIR: A LITTLE
EATING MEALS: A LITTLE
DRESSING REGULAR UPPER BODY CLOTHING: A LITTLE
SUGGESTED CMS G CODE MODIFIER DAILY ACTIVITY: CK
TURNING FROM BACK TO SIDE WHILE IN FLAT BAD: A LITTLE
CLIMB 3 TO 5 STEPS WITH RAILING: A LITTLE
DAILY ACTIVITIY SCORE: 21
SUGGESTED CMS G CODE MODIFIER DAILY ACTIVITY: CJ

## 2023-06-09 ASSESSMENT — PATIENT HEALTH QUESTIONNAIRE - PHQ9
2. FEELING DOWN, DEPRESSED, IRRITABLE, OR HOPELESS: NOT AT ALL
SUM OF ALL RESPONSES TO PHQ9 QUESTIONS 1 AND 2: 0
1. LITTLE INTEREST OR PLEASURE IN DOING THINGS: NOT AT ALL

## 2023-06-09 ASSESSMENT — PAIN DESCRIPTION - PAIN TYPE
TYPE: SURGICAL PAIN

## 2023-06-09 ASSESSMENT — ACTIVITIES OF DAILY LIVING (ADL): TOILETING: INDEPENDENT

## 2023-06-09 NOTE — OR NURSING
1121: To PACU from OR via gurney,  sleeping, respirations spontaneous and non-labored. Stable on 6L O2 via mask. Ice pack applied over c/d/i right shoulder surgical dressings. RUE CMS intact. RUE immobilizer in place. Plan to keep pt in PACU for full hour per [facility] protocol.     1130: Pt states pain in forearm 4/10, tolerable. C/o 3/10 pain coming from right axillary across chest. Noted large bruise in area as well that was present from fall. C/o mild nausea, medicated per prn orders    1145: Pt c/o right forearm pain 5/10., medicated per prn orders. Nausea resolved. Tolerating po fluids. Stable on 2L NC    1200: Pt resting comfortably.     1215: Pt states pain is 2/10. Remains stable on 1L NC. Desat to 85% on RA. Given IS and instructed on use with goal of 2800, pt currently inhaling volumes of approx 1000.      1220: Updated pt wife Kandy via phone.    1230: New orders received from , orders carried out.     1245: Pt resting comfortably at this time. Stable on 2L NC. Placed pt on RA, desat to 82-85%    1300: Pt resting comfortably    1310: Updated  on pt O2 requirements. New orders received for O2 sat parameters.    1318: Xray completed at bedside.    1320: Meets criteria to transfer to Stage 2.     1345: Aquacell dressing to Right shoulder noted to have sma;; amount bloody drainage pooling at bottom of dressing. Dressing changed to new aquacell dressing. Incision noted to be clean dry and intact. Noted very small area with slow dripping blood drainage.     1352: Transferred on O2 tank @ 500

## 2023-06-09 NOTE — ANESTHESIA TIME REPORT
Anesthesia Start and Stop Event Times     Date Time Event    6/9/2023 0916 Ready for Procedure     0927 Anesthesia Start     1125 Anesthesia Stop        Responsible Staff  06/09/23    Name Role Begin End    Twan Weinstein M.D. Anesth 0927 1125        Overtime Reason:  no overtime (within assigned shift)    Comments: Js duron                                                      Well-controlled, continue current medications

## 2023-06-09 NOTE — THERAPY
Occupational Therapy   Initial Evaluation     Patient Name: Donny Haas  Age:  83 y.o., Sex:  male  Medical Record #: [id number]  Today's Date: 6/9/2023     Precautions: Immobilizer Right Upper Extremity, Non Weight Bearing Right Upper Extremity, No external rotation past neutral, Fall Risk    Assessment  Patient is 83 y.o. male with comminuted and severely displaced proximal humerus fracture after fall off ladder ~2 wks ago. S/p R Reverse TSA. R hand dominant. Pt is A&Ox4, O2 @2L NC in place- pt wears CPAP at night. Indep and active prior. Wife Kandy present for caregiver training. Education on role of OT, proper brace fit, donning/doffing of brace, safe UB dressing, pendulum ex's, AROM R hand,wrist,elbow, home safety during ADL's. Pt shows good balance; spv/mod indep with tranfers/ambulation. ModA with donning shirt/brace. Spb/SBA with other ADL's; see below for CLOF. No further questions from pt/spouse; DC ready from OT.         Plan  Eval only.     DC Equipment Recommendations: (P) None  Discharge Recommendations: (P) Anticipate that the patient will have no further occupational therapy needs after discharge from the hospital     Subjective  Pleasant and cooperative      Objective     06/09/23 1455   Prior Living Situation   Prior Services None   Housing / Facility 1 Story House   Steps Into Home 1   Bathroom Set up Walk In Shower;Shower Chair   Equipment Owned Oxygen  (ADA toilet)   Lives with - Patient's Self Care Capacity Spouse   Prior Level of ADL Function   Self Feeding Independent   Grooming / Hygiene Independent   Bathing Independent   Dressing Independent   Toileting Independent   Prior Level of IADL Function   Medication Management Independent   Laundry Independent   Kitchen Mobility Independent   Finances Independent   Home Management Independent   Shopping Independent   Prior Level Of Mobility Independent Without Device in Home   Driving / Transportation Driving Independent   Occupation  (Pre-Hospital Vocational) Retired Due To Age   Leisure Interests Hobbies   History of Falls   History of Falls Yes   Precautions   Precautions Immobilizer Right Upper Extremity;Non Weight Bearing Right Upper Extremity;Fall Risk   Vitals   O2 (LPM) 2   O2 Delivery Device Nasal Cannula   Pain 0 - 10 Group   Therapist Pain Assessment Nurse Notified;0   Cognition    Cognition / Consciousness WDL   Passive ROM Upper Body   Passive ROM Upper Body WDL   Active ROM Upper Body   Active ROM Upper Body  X   Dominant Hand Right;Using Non-Dominant Hand   Comments RUE in brace   Strength Upper Body   Upper Body Strength  WDL  (LUE)   Sensation Upper Body   Upper Extremity Sensation  X   Comments nerve block to RUE   Upper Body Muscle Tone   Upper Body Muscle Tone  X   Coordination Upper Body   Coordination X   Balance Assessment   Sitting Balance (Static) Good   Sitting Balance (Dynamic) Good   Standing Balance (Static) Good   Standing Balance (Dynamic) Good   Weight Shift Sitting Good   Weight Shift Standing Good   ADL Assessment   Upper Body Dressing Moderate Assist   Lower Body Dressing Standby Assist   Toileting Supervision   How much help from another person does the patient currently need...   Putting on and taking off regular lower body clothing? 4   Bathing (including washing, rinsing, and drying)? 3   Toileting, which includes using a toilet, bedpan, or urinal? 4   Putting on and taking off regular upper body clothing? 2   Taking care of personal grooming such as brushing teeth? 4   Eating meals? 4   6 Clicks Daily Activity Score 21   Functional Mobility   Sit to Stand Supervised   Bed, Chair, Wheelchair Transfer Supervised   Toilet Transfers Supervised   Transfer Method Stand Step   Mobility steady, with O2   Distance (Feet) 100   # of Times Distance was Traveled 1   Activity Tolerance   Sitting in Chair >1hr   Sitting Edge of Bed 12   Standing 10   Education Group   Education Provided Brace Wear and Care;Upper  Extremity Range of Motion;Home Safety;Activities of Daily Living   Role of Occupational Therapist Patient Response Patient;Family;Acceptance;Explanation;Verbal Demonstration   Upper Ext ROM Patient Response Patient;Family;Acceptance;Handout;Verbal Demonstration;Demonstration   Brace Wear & Care Patient Response Patient;Family;Acceptance;Verbal Demonstration;Explanation   Home Safety Patient Response Patient;Family;Acceptance;Verbal Demonstration;Explanation   ADL Patient Response Patient;Family;Acceptance;Action Demonstration;Explanation   Anticipated Discharge Equipment and Recommendations   DC Equipment Recommendations None   Discharge Recommendations Anticipate that the patient will have no further occupational therapy needs after discharge from the hospital   Interdisciplinary Plan of Care Collaboration   IDT Collaboration with  Nursing   Patient Position at End of Therapy Family / Friend in Room;Call Light within Reach;Tray Table within Reach   Collaboration Comments DC ready from OT

## 2023-06-09 NOTE — ANESTHESIA PROCEDURE NOTES
Peripheral Block    Date/Time: 6/9/2023 9:00 AM    Performed by: Twan Weinstein M.D.  Authorized by: Twna Weinstein M.D.    Patient Location:  Pre-op  Start Time:  6/9/2023 9:00 AM  End Time:  6/9/2023 9:04 AM  Reason for Block: at surgeon's request and post-op pain management ONLY    patient identified, IV checked, site marked, risks and benefits discussed, surgical consent, monitors and equipment checked, pre-op evaluation and timeout performed    Patient Position:  Supine  Prep: ChloraPrep    Monitoring:  Heart rate, continuous pulse ox and cardiac monitor  Block Region:  Upper Extremity  Upper Extremity - Block Type:  BRACHIAL PLEXUS block, Supraclavicular approach    Laterality:  Right  Procedures: ultrasound guided  Image captured, interpreted and electronically stored.  Local Infiltration:  Lidocaine  Strength:  1 %  Dose:  3 ml  Block Type:  Single-shot  Needle Length:  100mm  Needle Gauge:  21 G  Needle Localization:  Ultrasound guidance  Injection Assessment:  Negative aspiration for heme, no paresthesia on injection, incremental injection and local visualized surrounding nerve on ultrasound  Evidence of intravascular injection: No     Ultrasound images saved in chart

## 2023-06-09 NOTE — ANESTHESIA PREPROCEDURE EVALUATION
Case: [id number] Date/Time: 06/09/23 1015    Procedure: RIGHT REVERSE TOTAL SHOULDER ARTHROPLASTY    Pre-op diagnosis: CLOSED FRACTURE OF PROXIMAL RIGHT HUMERUS    Location:  OR 04 / SURGERY [facility]    Surgeons: Shawn Ward M.D.          Relevant Problems   ANESTHESIA   (positive) Sleep apnea      PULMONARY   (positive) Dyspnea on exertion      CARDIAC   (positive) Dyspnea on exertion   (positive) Essential hypertension   (positive) Paroxysmal atrial fibrillation (HCC)         (positive) MUNA (acute kidney injury) (HCC)   (positive) Chronic renal impairment, stage 1      ENDO   (positive) Acquired hypothyroidism       Physical Exam    Airway   Mallampati: II  TM distance: >3 FB  Neck ROM: full       Cardiovascular - normal exam  Rhythm: regular  Rate: normal  (-) murmur     Dental - normal exam           Pulmonary - normal exam  Breath sounds clear to auscultation     Abdominal    Neurological - normal exam                 Anesthesia Plan    ASA 3   ASA physical status 3 criteria: hypertension - poorly controlled    Plan - general and peripheral nerve block     Peripheral nerve block will be post-op pain control  Airway plan will be ETT          Induction: intravenous    Postoperative Plan: Postoperative administration of opioids is intended.    Pertinent diagnostic labs and testing reviewed    Informed Consent:    Anesthetic plan and risks discussed with patient.    Use of blood products discussed with: patient whom consented to blood products.

## 2023-06-09 NOTE — OR NURSING
1406: Report received from Donna DELEON. Patient continues to desat to the low 80's on room air, nursing communication from Dr. Weinstein states patient can go home between 82-85% on room air and use his home oxygen concentrator as soon as he gets home. Small amount of drainage on surgical dressing marked, immobilizer in place.     1430: Patient continues to desat on room air dropping to the low of 75% patient placed back on 2 liters nasal canula and working with IS.     1500: Drainage on surgical dressing has increased approximately half an inch outside of marked line.     1515: Attempted to page Dr. Ward through his office, informed that he is in surgery will wait for return call.     1545: Drainage on surgical dressing has increased approximately an inch past the marked line. Patient continues to drop to high 70's while on room air. Still awaiting return call from Dr. Ward.     1546: RN case manager at bedside working on getting patient set up for portable home O2 tank. Informed that patient will need a new home order and face to face with doctor.     1617: Return call from Dr. Ward. New orders to admit patient for the night to watch drainage and oxygenation along with an order for an additional dose of TXA. Dr. Ward is also consulting with the hospitalist.     1636: TXA 1,000mg given per MAR order. Awaiting Dr. Nguyen's arrival to see patient.     1712: Dr. Nguyen at bedside to see patient.     1727: Report called to Roxann DELEON.    1733: Patient transferred to the floor by CNA in wheelchair on 2 liters nasal canula.

## 2023-06-09 NOTE — ANESTHESIA PROCEDURE NOTES
Airway    Date/Time: 6/9/2023 9:31 AM    Performed by: Twan Weinstein M.D.  Authorized by: Twan Weinstein M.D.    Location:  OR  Urgency:  Elective  Indications for Airway Management:  Anesthesia      Spontaneous Ventilation: absent    Sedation Level:  Deep  Preoxygenated: Yes    Patient Position:  Sniffing  Final Airway Type:  Endotracheal airway  Final Endotracheal Airway:  ETT  Cuffed: Yes    Technique Used for Successful ETT Placement:  Direct laryngoscopy    Insertion Site:  Oral  Blade Type:  Morgan  Laryngoscope Blade/Videolaryngoscope Blade Size:  3  ETT Size (mm):  7.0  Measured from:  Teeth  ETT to Teeth (cm):  22  Placement Verified by: auscultation and capnometry    Cormack-Lehane Classification:  Grade I - full view of glottis  Number of Attempts at Approach:  1

## 2023-06-09 NOTE — DISCHARGE INSTRUCTIONS
ACTIVITY: Rest and take it easy for the first 24 hours.  A responsible adult is recommended to remain with you during that time.  It is normal to feel sleepy.  We encourage you to not do anything that requires balance, judgment or coordination.    MILD FLU-LIKE SYMPTOMS ARE NORMAL. YOU MAY EXPERIENCE GENERALIZED MUSCLE ACHES, THROAT IRRITATION, HEADACHE AND/OR SOME NAUSEA.    FOR 24 HOURS DO NOT:  Drive, operate machinery or run household appliances.  Drink beer or alcoholic beverages.   Make important decisions or sign legal documents.    SPECIAL INSTRUCTIONS:  -Apply ice as much as possible   -Do not operate a vehicle or machinery while taking narcotic pain medications   -Return to clinic in 10-14 days Please call the office with any questions [phone]   -No external rotation past neutral   -No pushing up from a seated position   -No lifting >2 lbs with the operative extremity   -None weight bearing to operative extremity    DIET: To avoid nausea, slowly advance diet as tolerated, avoiding spicy or greasy foods for the first day.  Add more substantial food to your diet according to your physician's instructions.  Babies can be fed formula or breast milk as soon as they are hungry.  INCREASE FLUIDS AND FIBER TO AVOID CONSTIPATION.    SURGICAL DRESSING/BATHING: Keep dressings clean and intact You may shower with the bandages in place Do not soak the wound     FOLLOW-UP APPOINTMENT:  A follow-up appointment should be arranged with your doctor, call to schedule.    You should CALL YOUR PHYSICIAN if you develop:  Fever greater than 101 degrees F.  Pain not relieved by medication, or persistent nausea or vomiting.  Excessive bleeding (blood soaking through dressing) or unexpected drainage from the wound.  Extreme redness or swelling around the incision site, drainage of pus or foul smelling drainage.  Inability to urinate or empty your bladder within 8 hours.  Problems with breathing or chest pain.    You should  call [phone] if you develop problems with breathing or chest pain.  If you are unable to contact your doctor or surgical center, you should go to the nearest emergency room or urgent care center.  Physician's telephone #: [phone]    If any questions arise, call your doctor.  If your doctor is not available, please feel free to call the Surgical Center at [phone].     A registered nurse may call you a few days after your surgery to see how you are doing after your procedure.    MEDICATIONS: Resume taking daily medication.  Take prescribed pain medication with food.  If no medication is prescribed, you may take non-aspirin pain medication if needed.  PAIN MEDICATION CAN BE VERY CONSTIPATING.  Take a stool softener or laxative such as senokot, pericolace, or milk of magnesia if needed.    Last pain medication given Oxycodone 5mg given at .    If your physician has prescribed pain medication that includes Acetaminophen (Tylenol), do not take additional Acetaminophen (Tylenol) while taking the prescribed medication.    IMPORTANT NOTE IF YOU RECEIVED EXPAREL:    The liposomal bupivacaine (Exparel) teal arm band is used as a visual queue to alert healthcare professionals that you received a long-acting form of bupivacaine during your recent surgery. You should not receive additional local anesthetics (i.e. bupivacaine, lidocaine) for 96 hours after surgery. There may be situations where you are unable to communicate this information, so it is important for your safety to keep the teal arm band on for 96 hours (4 days) after surgery.            Peripheral Nerve Block Discharge Instructions from Same Day Surgery and Inpatient :    What to Expect - Upper Extremity  You may experience numbness and weakness in shoulder, arm, and hand  on the same side as your surgery  This is normal. For some people, this may be an unpleasant sensation. Be very careful with your numb limb  Ask for help when you need it  Shoulder Surgery  "Side Effects  In addition to numbness and weakness you may experience other symptoms  Other nerves that are close to those nerves injected can also be affected by local anesthesia  You may experience a hoarseness in your voice  Your breathing may feel different  You may also notice drooping of your eyelid, pupil constriction, and decreased sweating, on the side of your surgery  All of these side effects are normal and will resolve when the local anesthetic wears off   Prevent Injury  Protect the limb like a baby  Beware of exposing your limb to extreme heat or cold or trauma  The limb may be injured without you noticing because it is numb  Keep the limb elevated whenever possible  Do not sleep on the limb  Change the position of the limb regularly  Avoid putting pressure on your surgical limb  Pain Control  The initial block on the day of surgery will make your extremity feel \"numb\"  Any consecutive injection including prior to discharge from the hospital will make your extremity feel \"numb\"  You may feel an aching or burning when the local anesthesia starts to wear off  Take pain pills as prescribed by your surgeon  Call your surgeon or anesthesiologist if you do not have adequate pain control   Discharge Instructions     Comments: Keep dressings clean and intact   You may shower with the bandages in place   Do not soak the wound   Apply ice as much as possible   Do not operate a vehicle or machinery while taking narcotic pain medications   Return to clinic in 10-14 days   Please call the office with any questions [phone]     No external rotation past neutral   No pushing up from a seated position   No lifting >2 lbs with the operative extremity   None weight bearing to operative extremity    Shawn Ward M.D.            "

## 2023-06-09 NOTE — DISCHARGE PLANNING
This writer aware of need for oxygen post op per vital sign recordings. Pt states he is on service with [employer] for HS oxygen bleed into his cpap. This writer notified [employer] of need for portable tank and pt will need a new home oxygen order, face-2-face. Family and nurse, Edward, notified. Edward given MD order sheet and Face-2-Face worksheet. This writer to continue to monitor/follow-up.

## 2023-06-09 NOTE — OR NURSING
1352: Patient arrived to phase II from PACU 1 via gurney. Report received from Taylor DELEON. Respirations are spontaneous and unlabored. Dressing with small amount of drainage. VSS on 1L NC. RUE; radial pulse is 2+, cap refill less than 3 seconds, warm. Per Dr. Weinstein, patient is ok to be discharged without O2 as he has a concentrator at home. OT aware of patient.    1356: Family at bedside.    1400: Pt voiding with help of wife.    1406: Report to Edward DELENO

## 2023-06-09 NOTE — OP REPORT
DATE OF SERVICE:  6/9/2023     PREOPERATIVE DIAGNOSES:  1- Right rotator cuff tear arthropathy     POSTOPERATIVE DIAGNOSES:  1- Right rotator cuff tear arthropathy     SURGERY PERFORMED:    1- Right reverse total shoulder arthroplasty.  2- Open biceps tenodesis     SURGEON:  Shawn Ward MD     ANESTHESIA:  1.  General.  2.  Block for postoperative pain control.    ASSISTANT: Nickie Coto     COMPLICATIONS:  None.     ESTIMATED BLOOD LOSS:  200 mL     IMPLANTS:  Be reverse total shoulder system  Stem: 21  Body: Standard reverse  Poly: +6  Baseplate: Standard  Glenosphere: 40 mm eccentric with 2mm lateral offset     INDICATIONS FOR PROCEDURE:  Rosalia Leon is a very pleasant gentleman who sustained a right comminuted and severely displaced proximal humerus fracture.  Due to the nature of his fracture and the displacement the decision was made to taken the operating today for the above-mentioned procedure.     DESCRIPTION OF PROCEDURE:  On the day of surgery, the patient was seen in the   preoperative area where informed consent was obtained with all risks and   benefits of the procedure explained and all questions answered.  Mr. Haas wished to  proceed with the surgery.  The proper site was marked.  He was subsequently   taken to the operating room and placed in a supine position with all bony   prominences well padded.  General endotracheal anesthesia was induced.  They   were subsequently placed into a beach-chair position.  The right upper   extremity was then prepped and draped in the usual sterile fashion.     A time-out was performed with all persons in attendance agreeing on the proper  patient, proper surgical site, and proper surgery to be performed.     A standard deltopectoral approach was performed.  Sharp and blunt dissection   was taken down to the level of the cephalic vein.  Bleeding was well   controlled using Bovie electrocautery.  The cephalic vein was isolated and   then retracted  laterally but was involved in the trauma.  There was a laceration of the cephalic vein during the dissection and therefore I used hemoclips to obtain hemostasis.     Blunt dissection was then taken through the deltopectoral interval down to the  clavipectoral fascia.  The clavipectoral fascia was disrupted due to the trauma.  I was able to incise the fascia at the superior aspect of the pectoralis major tendon and isolate the long head of the biceps tendon.  I then dissected this proximally.  I then used Bovie electrocautery to dissect the humeral head and fragments free of surrounding soft tissue.  The humeral head and fragments were then removed from the wound and passed off to the back table.  A Kolbel retractor was then placed.  I then began performed performing a capsulotomy using Bovie electrocautery at the proximal aspect of the humerus.  The capsulotomy was continued both proximally and distally while the arm was gently extended and externally rotated.  At this time, I was able to visualize the glenoid.      Attention was then turned to preparation of glenoid.  Wound was well irrigated  with copious amounts of normal saline.  Loose osseous fragments were also   removed.  I then placed the guidepin into the glenoid vault. Once there, I used a half moon reamer to prepare the glenoid face.  I then used a reamer to prepare for the glenoid baseplate peg.  The glenoid was thoroughly irrigated with copious amounts of normal saline.  I then placed the glenoid baseplate, which had good fixation.  I then placed a central and peripheral screws.  These all had excellent   fixation.  The glenosphere was then placed after drying the [place] taper.  This  also had excellent fixation.     Attention was turned to preparation of the humeral side.  I began broaching   until there was good fit and fill.  I then placed a trial stem and body.  A trial   polyethylene liner was placed and the joint was reduced.  Once the  appropriate  size components was determined I removed the trial implants  and thoroughly irrigated the humerus. Final implants were placed and the joint  Was reduced.  This had excellent fixation and stability as well as range of motion.   The wound was then thoroughly irrigated with copious amounts of normal saline.  The deltopectoral interval was repaired using #2 FiberWire with tails.  The subcutaneous   tissues were repaired using 2-0 Vicryl and monocryl.  The skin was repaired using staples.  The wound was then dressed with an aquacel dressing and the arm was placed into a sling.  General endotracheal anesthesia was reversed and she was taken to the PACU in good and stable condition.     DISPOSITION:  Mr. Haas will be seen in the PACU by OT who will instruct in proper pendulum exercises and post op restrictions.   We will plan for d/c to home once he has been cleared by OT.  The patient will be discharged to home on a regular diet and may shower with the bandage in place but should not soak the wound.  Ice and elevation is recommended along with wearing the sling at all times except for when showering or performing pendulum exercises. The patient may perform active range of  motion exercises to the operative extremity, but should not push up from a seated position. Nonweightbearing to the operative extremity.  No external rotation past neutral.

## 2023-06-09 NOTE — OR NURSING
Procedure, patient allergies, and NPO status verified. Home med rec completed and belongings secured. Patient verbalizes understanding of pain scale, expected course of stay, and plan of care. Surgical site verified with pt, IV access established, and SCD placed on BLE.     Dr. Weinstein notified of clearance strongly recommended due to EKG.

## 2023-06-09 NOTE — ANESTHESIA POSTPROCEDURE EVALUATION
Patient: Donny Haas    Procedure Summary     Date: 06/09/23 Room / Location:  OR  / SURGERY [facility]    Anesthesia Start: 0927 Anesthesia Stop: 1125    Procedure: RIGHT REVERSE TOTAL SHOULDER ARTHROPLASTY (Right: Shoulder) Diagnosis: (CLOSED FRACTURE OF PROXIMAL RIGHT HUMERUS)    Surgeons: Shawn Ward M.D. Responsible Provider: Twan Weinstein M.D.    Anesthesia Type: general, peripheral nerve block ASA Status: 3          Final Anesthesia Type: general, peripheral nerve block  Last vitals  BP   Blood Pressure : (!) 140/65    Temp   36 °C (96.8 °F)    Pulse   71   Resp   18    SpO2   91 %      Anesthesia Post Evaluation    Patient location during evaluation: PACU  Patient participation: complete - patient participated  Level of consciousness: awake and alert  Pain score: 1    Airway patency: patent  Anesthetic complications: no  Cardiovascular status: hemodynamically stable  Respiratory status: acceptable  Hydration status: euvolemic    PONV: none          No notable events documented.     Nurse Pain Score: 2 (NPRS)

## 2023-06-10 VITALS
RESPIRATION RATE: 16 BRPM | OXYGEN SATURATION: 96 % | HEART RATE: 83 BPM | BODY MASS INDEX: 32.23 KG/M2 | SYSTOLIC BLOOD PRESSURE: 128 MMHG | WEIGHT: 217.59 LBS | TEMPERATURE: 98.2 F | DIASTOLIC BLOOD PRESSURE: 64 MMHG | HEIGHT: 69 IN

## 2023-06-10 LAB
ALBUMIN SERPL BCP-MCNC: 3.4 G/DL (ref 3.2–4.9)
ALBUMIN/GLOB SERPL: 1.8 G/DL
ALP SERPL-CCNC: 102 U/L (ref 30–99)
ALT SERPL-CCNC: 16 U/L (ref 2–50)
ANION GAP SERPL CALC-SCNC: 7 MMOL/L (ref 7–16)
AST SERPL-CCNC: 17 U/L (ref 12–45)
BILIRUB SERPL-MCNC: 0.4 MG/DL (ref 0.1–1.5)
BUN SERPL-MCNC: 26 MG/DL (ref 8–22)
CALCIUM ALBUM COR SERPL-MCNC: 8.9 MG/DL (ref 8.5–10.5)
CALCIUM SERPL-MCNC: 8.4 MG/DL (ref 8.4–10.2)
CHLORIDE SERPL-SCNC: 102 MMOL/L (ref 96–112)
CO2 SERPL-SCNC: 24 MMOL/L (ref 20–33)
CREAT SERPL-MCNC: 1.26 MG/DL (ref 0.5–1.4)
EKG IMPRESSION: NORMAL
ERYTHROCYTE [DISTWIDTH] IN BLOOD BY AUTOMATED COUNT: 50.4 FL (ref 35.9–50)
GFR SERPLBLD CREATININE-BSD FMLA CKD-EPI: 57 ML/MIN/1.73 M 2
GLOBULIN SER CALC-MCNC: 1.9 G/DL (ref 1.9–3.5)
GLUCOSE SERPL-MCNC: 143 MG/DL (ref 65–99)
HCT VFR BLD AUTO: 27.1 % (ref 42–52)
HGB BLD-MCNC: 8.8 G/DL (ref 14–18)
MAGNESIUM SERPL-MCNC: 1.9 MG/DL (ref 1.5–2.5)
MCH RBC QN AUTO: 31.3 PG (ref 27–33)
MCHC RBC AUTO-ENTMCNC: 32.5 G/DL (ref 32.3–36.5)
MCV RBC AUTO: 96.4 FL (ref 81.4–97.8)
PLATELET # BLD AUTO: 199 K/UL (ref 164–446)
PMV BLD AUTO: 9.5 FL (ref 9–12.9)
POTASSIUM SERPL-SCNC: 4.8 MMOL/L (ref 3.6–5.5)
PROT SERPL-MCNC: 5.3 G/DL (ref 6–8.2)
RBC # BLD AUTO: 2.81 M/UL (ref 4.7–6.1)
SODIUM SERPL-SCNC: 133 MMOL/L (ref 135–145)
WBC # BLD AUTO: 7.4 K/UL (ref 4.8–10.8)

## 2023-06-10 PROCEDURE — G0378 HOSPITAL OBSERVATION PER HR: HCPCS

## 2023-06-10 PROCEDURE — 36415 COLL VENOUS BLD VENIPUNCTURE: CPT

## 2023-06-10 PROCEDURE — 700102 HCHG RX REV CODE 250 W/ 637 OVERRIDE(OP): Performed by: ORTHOPAEDIC SURGERY

## 2023-06-10 PROCEDURE — A9270 NON-COVERED ITEM OR SERVICE: HCPCS | Performed by: HOSPITALIST

## 2023-06-10 PROCEDURE — 93010 ELECTROCARDIOGRAM REPORT: CPT | Performed by: INTERNAL MEDICINE

## 2023-06-10 PROCEDURE — 80053 COMPREHEN METABOLIC PANEL: CPT

## 2023-06-10 PROCEDURE — 99239 HOSP IP/OBS DSCHRG MGMT >30: CPT | Performed by: INTERNAL MEDICINE

## 2023-06-10 PROCEDURE — 94760 N-INVAS EAR/PLS OXIMETRY 1: CPT

## 2023-06-10 PROCEDURE — 85027 COMPLETE CBC AUTOMATED: CPT

## 2023-06-10 PROCEDURE — 700102 HCHG RX REV CODE 250 W/ 637 OVERRIDE(OP): Performed by: HOSPITALIST

## 2023-06-10 PROCEDURE — 83735 ASSAY OF MAGNESIUM: CPT

## 2023-06-10 PROCEDURE — A9270 NON-COVERED ITEM OR SERVICE: HCPCS | Performed by: ORTHOPAEDIC SURGERY

## 2023-06-10 RX ORDER — ACETAMINOPHEN 650 MG/1
650 SUPPOSITORY RECTAL EVERY 6 HOURS
Status: DISCONTINUED | OUTPATIENT
Start: 2023-06-10 | End: 2023-06-10 | Stop reason: HOSPADM

## 2023-06-10 RX ORDER — ACETAMINOPHEN 500 MG
500 TABLET ORAL EVERY 6 HOURS PRN
Status: DISCONTINUED | OUTPATIENT
Start: 2023-06-10 | End: 2023-06-10 | Stop reason: HOSPADM

## 2023-06-10 RX ORDER — OXYCODONE HYDROCHLORIDE 5 MG/1
5 TABLET ORAL EVERY 4 HOURS PRN
Status: DISCONTINUED | OUTPATIENT
Start: 2023-06-10 | End: 2023-06-10 | Stop reason: HOSPADM

## 2023-06-10 RX ORDER — ACETAMINOPHEN 650 MG/1
650 SUPPOSITORY RECTAL EVERY 6 HOURS PRN
Status: DISCONTINUED | OUTPATIENT
Start: 2023-06-15 | End: 2023-06-10 | Stop reason: HOSPADM

## 2023-06-10 RX ADMIN — LOSARTAN POTASSIUM 50 MG: 25 TABLET, FILM COATED ORAL at 05:32

## 2023-06-10 RX ADMIN — FLUOXETINE 20 MG: 20 CAPSULE ORAL at 05:32

## 2023-06-10 RX ADMIN — ACETAMINOPHEN 500 MG: 500 TABLET ORAL at 11:42

## 2023-06-10 ASSESSMENT — PAIN DESCRIPTION - PAIN TYPE
TYPE: SURGICAL PAIN
TYPE: SURGICAL PAIN

## 2023-06-10 NOTE — CARE PLAN
The patient is Stable - Low risk of patient condition declining or worsening    Shift Goals  Clinical Goals: Monitor patient for any sign of SOB and able to wean off from Oxygen  Patient Goals: go home today    Progress made toward(s) clinical / shift goals:  Patient is off from Oxygen, SPO2 is maintaining between 92-96%. Denies any SOB    Patient is not progressing towards the following goals:    Problem: Fall Risk  Goal: Patient will remain free from falls  Outcome: Progressing     Problem: Post-Operative Shoulder Replacement  Goal: Patient's neurovascular status will be maintained or improve  Outcome: Progressing  Goal: Early mobilization post surgery  Outcome: Progressing     Problem: Pain - Post Surgery  Goal: Alleviation or reduction of pain post surgery  Outcome: Progressing     Problem: Respiratory/Oxygenation Function Post-Surgical  Goal: Patient will achieve/maintain normal respiratory rate/effort  Outcome: Progressing     Problem: Pain - Standard  Goal: Alleviation of pain or a reduction in pain to the patient’s comfort goal  Outcome: Progressing

## 2023-06-10 NOTE — PROGRESS NOTES
Received patient from night shift, comfortably sleeping on CPAP at 2 liters.     0730-Pt is awake and alert. On 2 liters via NC.  Pt denies any n/v. Pt denies any   tingling but has numbness to his right thumb. Dressing to right shoulder with old drainage noted. Pt reports mild pain only to surgical site.Ice polar pack on. Fall precautions in place,Bed in locked and lowest position and Call light within reach.    0740- patient up to the bathroom, voided.  Walking O2 test done and charted     1023- O2 decreased to 1 L, SPO2 96%    1125- Patient is on RA, Maintaining a good SPO2 96%.Denies any SOB

## 2023-06-10 NOTE — PROGRESS NOTES
Received bedside patient report from PANCHO Hackett. Patient is awake, alert & oriented x4. Patient on 2L nasal cannula. Right shoulder in immobilizer, dressing in place with old drainage. Cold pack in place. Patient resting in bed.  Patient educated on call light use for needs. Belongings within reach. Safety precautions in place.      1950 - Paged office for Dr. Ward, left message for answering service for patient orders.     2016 - Received call back from Dr. Redmond, new orders received.

## 2023-06-10 NOTE — CARE PLAN
The patient is Stable - Low risk of patient condition declining or worsening    Shift Goals  Clinical Goals: Patient oxygen saturation will be above 90% this shift  Patient Goals: Pain control, rest comfortably    Progress made toward(s) clinical / shift goals:  EKG completed. Patient on 2L nasal cannula, on 2L CPAP at night. Patient has immobilizer to right shoulder, polar ice in place, education provided. Patient resting this shift.     Patient is not progressing towards the following goals: N/A

## 2023-06-10 NOTE — PROGRESS NOTES
Pt seen this AM.  Pain well controlled. Bandage changed.  No active drainage at this time.  Motor/sensory intact to radial, median and ulnar nerves.  Radial pulse 2+.      Assessment: Right proximal humerus fracture    Plan: d/c to home today   RTC 10-14 days

## 2023-06-10 NOTE — PROGRESS NOTES
4 Eyes Skin Assessment Completed by PANCHO Hackett and PANCHO Snell.    Head WDL  Ears WDL  Nose WDL  Mouth WDL  Neck WDL  Breast/Chest WDL  Shoulder Blades bruising and incision with aquacel and arm sling on it  Spine WDL  (R) Arm/Elbow/Hand bruising and incision  (L) Arm/Elbow/Hand WDL  Abdomen WDL  Groin WDL  Scrotum/Coccyx/Buttocks WDL  (R) Leg right hip has bruising,right leg varicose  (L) Leg varicose  (R) Heel/Foot/Toe WDL  (L) Heel/Foot/Toe WDL          Devices In Places Blood Pressure Cuff and Pulse Ox      Interventions In Place NC W/Ear Foams    Possible Skin Injury No    Pictures Uploaded Into Epic N/A  Wound Consult Placed N/A  RN Wound Prevention Protocol Ordered No

## 2023-06-10 NOTE — PROGRESS NOTES
Received report from  PACU at 1721. Pt to floor via [employer] at 1740.Ambulated going to his bed, standby assist x 1. Pt is awake and alert. On 2 liters via NC.  Pt denies any n/v. Pt denies any   tingling but has numbness to his right thumb. Dressing to right shoulder with old drainage noted. Pt reports mild pain only to surgical site. pain .Ice polar pack applied by CNA. Fall precautions in place,Bed in locked and lowest position and Call light within reach.

## 2023-06-10 NOTE — PROGRESS NOTES
Discharge paper work reviewed with patient and wife at bedside.Copy given.Questions encouraged and aswered.

## 2023-06-10 NOTE — DISCHARGE SUMMARY
"Discharge Summary    CHIEF COMPLAINT ON ADMISSION  No chief complaint on file.      Reason for Admission  S/P shoulder replacement, right     Admission Date  6/9/2023    CODE STATUS  Full Code    HPI & HOSPITAL COURSE  Per notes, \"83 y.o. male with a past medical history of hypothyroidism, hypertension, atrial fibrillation, hyperlipidemia and osteoarthritis who presented 6/9/2023 for elective right shoulder arthroplasty.  The patient tolerated the procedure well however postoperatively he remained hypoxic saturating in the low 80s, requiring supplemental oxygen.  I evaluated the patient at bedside he denies having chest pain or shortness of breath, cough or fevers.  Patient reports having minimal pain at the surgery site. \"    Patient was admitted and underwent an elective right shoulder arthroplasty.  He developed postoperative hypoxia and was monitored overnight.  Patient does use CPAP and oxygen overnight, but has not required oxygen in the daytime.  He was able to wean off oxygen with incentive spirometry.  A walk test was completed and patient will not require daytime oxygen at home.  I discussed the importance of monitoring O2 saturations and returning immediately to ED for further evaluation for shortness of breath.  Patient should follow-up with orthopedic surgery and also with PCP within 1 to 2 weeks.    Therefore, he is discharged in good and stable condition to home with close outpatient follow-up.    The patient recovered much more quickly than anticipated on admission.    Discharge Date  6/10/2023    FOLLOW UP ITEMS POST DISCHARGE  FU with orthopedic surgery     DISCHARGE DIAGNOSES  Principal Problem:    Postoperative hypoxia (POA: Yes)  Active Problems:    Sleep apnea (POA: Yes)    Acquired hypothyroidism (POA: Yes)    Essential hypertension (POA: Yes)    Paroxysmal atrial fibrillation (HCC) (POA: Yes)    Closed fracture of proximal end of right humerus with routine healing (POA: Unknown)  Resolved " Problems:    * No resolved hospital problems. *      FOLLOW UP  No future appointments.  Trina Awan M.D.  [address]  [place]  [facility] [id number] [phone]    Schedule an appointment as soon as possible for a visit  As needed    Shawn Ward M.D.  [address]  [facility] [id number] [phone]    Schedule an appointment as soon as possible for a visit  For wound re-check    Shawn Ward M.D.  [address]  [address]  [facility] [id number] [phone]            MEDICATIONS ON DISCHARGE     Medication List        CHANGE how you take these medications        Instructions   atorvastatin 20 MG Tabs  What changed: when to take this  Commonly known as: LIPITOR   TAKE 1 TABLET BY MOUTH EVERY DAY            CONTINUE taking these medications        Instructions   apixaban 5mg Tabs  Commonly known as: Eliquis   Take 1 Tablet by mouth 2 times a day. PLEASE CALL [phone] AND SCHEDULE A FOLLOW UP APPOINTMENT FOR FURTHER REFILLS. THANK YOU!  Dose: 5 mg     [facility] Vitamin D3 65537 UNIT Caps  Generic drug: Cholecalciferol   Take 10,000 Units by mouth see administration instructions. Pt takes on Monday, Wed, and Friday  Dose: 10,000 Units     finasteride 5 MG Tabs  Commonly known as: PROSCAR   Take 5 mg by mouth every evening.  Dose: 5 mg     FLUoxetine 20 MG Caps  Commonly known as: PROZAC   Take 20 mg by mouth every morning.  Dose: 20 mg     FOLATE PO   Take 1 Tablet by mouth see administration instructions. Pt takes on Sunday, Mon, Wed, Fri, and Sat  Dose: 1 Tablet     ICAPS AREDS 2 PO   Take 1 Capsule by mouth 2 times a day.  Dose: 1 Capsule     levothyroxine 112 MCG Tabs  Commonly known as: SYNTHROID   Take 112 mcg by mouth every day.  Dose: 112 mcg     losartan 50 MG Tabs  Commonly known as: COZAAR   Take 50 mg by mouth 2 times a day.  Dose: 50 mg     Matzim  MG Tb24  Generic drug: dilTIAZem HCl ER   Take 1 Tablet by mouth every day.  Dose: 1 Tablet      terazosin 5 MG Caps  Commonly known as: HYTRIN   Take 5 mg by mouth every evening.  Dose: 5 mg     VITAMIN B-12 PO   Take 1 Tablet by mouth every day.  Dose: 1 Tablet              Allergies  Allergies   Allergen Reactions    Penicillins Rash     rash    Penicillin G Rash and Unspecified    Latex Rash     Allergy test- very slight rash    Oxycodone-Acetaminophen Unspecified     Hallucinations per pt.        DIET  Orders Placed This Encounter   Procedures    Diet Order Diet: Cardiac     Standing Status:   Standing     Number of Occurrences:   1     Order Specific Question:   Diet:     Answer:   Cardiac [6]       ACTIVITY  As tolerated.  Weight bearing as tolerated    CONSULTATIONS  Orthopedic surgery     PROCEDURES  None    LABORATORY  Lab Results   Component Value Date    SODIUM 133 (L) 06/10/2023    POTASSIUM 4.8 06/10/2023    CHLORIDE 102 06/10/2023    CO2 24 06/10/2023    GLUCOSE 143 (H) 06/10/2023    BUN 26 (H) 06/10/2023    CREATININE 1.26 06/10/2023        Lab Results   Component Value Date    WBC 7.4 06/10/2023    HEMOGLOBIN 8.8 (L) 06/10/2023    HEMATOCRIT 27.1 (L) 06/10/2023    PLATELETCT 199 06/10/2023        Total time of the discharge process exceeds 45 minutes.

## 2023-06-10 NOTE — ASSESSMENT & PLAN NOTE
History of obstructive sleep apnea  Likely multifactorial secondary to atelectasis, anesthesia /sedation  I will check chest x-ray, BNP echocardiography, EKG   Oxygen as needed, Respiratory protocol, Incentive spirometry

## 2023-06-10 NOTE — CONSULTS
Hospital Medicine Consultation    Date of Service  6/9/2023    Referring Physician  Shawn Ward M.D.    Consulting Physician  Tayler Nguyen M.D.    Reason for Consultation  Postoperative hypoxia help manage patient multiple medical problems.    History of Presenting Illness  83 y.o. male with a past medical history of hypothyroidism, hypertension, atrial fibrillation, hyperlipidemia and osteoarthritis who presented 6/9/2023 for elective right shoulder arthroplasty.  The patient tolerated the procedure well however postoperatively he remained hypoxic saturating in the low 80s, requiring supplemental oxygen.  I evaluated the patient at bedside he denies having chest pain or shortness of breath, cough or fevers.  Patient reports having minimal pain at the surgery site.      Plan of care discussed with PACU nursing staff, the patient and patient family present at bedside    Review of Systems  Review of Systems   Constitutional:  Negative for chills and fever.   Eyes:  Negative for discharge and redness.   Respiratory:  Negative for cough, shortness of breath and stridor.    Cardiovascular:  Negative for chest pain and leg swelling.   Gastrointestinal:  Negative for abdominal pain and vomiting.   Genitourinary:  Negative for flank pain.   Musculoskeletal:  Positive for joint pain. Negative for myalgias.        Minimal pain at the surgery site   Skin: Negative.    Neurological:  Negative for focal weakness.   Endo/Heme/Allergies:  Does not bruise/bleed easily.   Psychiatric/Behavioral:  The patient is not nervous/anxious.      Past Medical History   has a past medical history of Arrhythmia, Arthritis, Cancer (HCC), Cataract, Dental disorder, Hemorrhagic disorder (HCC), High cholesterol, Hyperlipidemia, Hypertension, Pain, Psychiatric problem, Sleep apnea, Thyroid disease, and Urinary incontinence.    Surgical History   has a past surgical history that includes dental surgery; tonsillectomy; hernia repair  (Right); cataract extraction with iol; other orthopedic surgery; laser ablation; other; and blepharoplasty (Bilateral).    Family History  family history includes Cancer in his mother; Diabetes in his brother and sister; Heart Attack in his maternal grandfather; No Known Problems in his maternal grandmother; Thyroid in his brother and sister.    Social History   reports that he quit smoking about 44 years ago. His smoking use included cigarettes. He has a 19.00 pack-year smoking history. He has never used smokeless tobacco. He reports current alcohol use. He reports that he does not use drugs.    Medications  Prior to Admission Medications   Prescriptions Last Dose Informant Patient Reported? Taking?   Cholecalciferol ([facility] VITAMIN D3) 83550 UNIT Cap 6/5/2023 at 1100 Patient Yes No   Sig: Take 10,000 Units by mouth see administration instructions. Pt takes on Monday, Wed, and Friday   Cyanocobalamin (VITAMIN B-12 PO) 6/5/2023 at 0900 Patient Yes No   Sig: Take 1 Tablet by mouth every day.   FLUoxetine (PROZAC) 20 MG Cap 6/8/2023 at 2200 Patient Yes No   Sig: Take 20 mg by mouth every morning.   Folic Acid (FOLATE PO) 5/30/2023 at 1200 Patient Yes No   Sig: Take 1 Tablet by mouth see administration instructions. Pt takes on Sunday, Mon, Wed, Fri, and Sat   MATZIM  MG TABLET SR 24 HR 6/8/2023 Patient Yes No   Sig: Take 1 Tablet by mouth every day.   Multiple Vitamins-Minerals (ICAPS AREDS 2 PO) 6/6/2023 at 1900 Patient Yes No   Sig: Take 1 Capsule by mouth 2 times a day.   apixaban (ELIQUIS) 5mg Tab 6/6/2023 at 1900 Patient No No   Sig: Take 1 Tablet by mouth 2 times a day. PLEASE CALL [phone] AND SCHEDULE A FOLLOW UP APPOINTMENT FOR FURTHER REFILLS. THANK YOU!   atorvastatin (LIPITOR) 20 MG Tab 6/8/2023 at 2100 Patient No No   Sig: TAKE 1 TABLET BY MOUTH EVERY DAY   Patient taking differently: Take 20 mg by mouth every evening.   finasteride (PROSCAR) 5 MG Tab  at 0900 Patient Yes No   Sig: Take 5 mg by  mouth every evening.   levothyroxine (SYNTHROID) 112 MCG Tab 6/8/2023 at 2200 Patient Yes No   Sig: Take 112 mcg by mouth every day.   losartan (COZAAR) 50 MG Tab 6/7/2023 at 2200 Patient Yes No   Sig: Take 50 mg by mouth 2 times a day.   terazosin (HYTRIN) 5 MG Cap 6/8/2023 at 2100 Patient Yes No   Sig: Take 5 mg by mouth every evening.      Facility-Administered Medications: None     Allergies  Allergies   Allergen Reactions    Penicillins Rash     rash    Penicillin G Rash and Unspecified    Latex Rash     Allergy test- very slight rash    Oxycodone-Acetaminophen Unspecified     Hallucinations per pt.      Physical Exam  Temp:  [36 °C (96.8 °F)-36.8 °C (98.3 °F)] 36.8 °C (98.3 °F)  Pulse:  [68-83] 73  Resp:  [14-18] 16  BP: (110-140)/(47-65) 127/61  SpO2:  [80 %-100 %] 93 %    Physical Exam  Constitutional:       General: He is not in acute distress.     Appearance: He is not ill-appearing or diaphoretic.   HENT:      Head: Atraumatic.      Right Ear: External ear normal.      Left Ear: External ear normal.      Nose: No congestion or rhinorrhea.      Mouth/Throat:      Mouth: Mucous membranes are moist.   Eyes:      General: No scleral icterus.        Right eye: No discharge.         Left eye: No discharge.      Pupils: Pupils are equal, round, and reactive to light.   Cardiovascular:      Rate and Rhythm: Normal rate and regular rhythm.   Pulmonary:      Comments: Requiring 2 L of oxygen to achieve adequate saturation.  Is able to speak in full sentences.  Abdominal:      General: There is no distension.   Musculoskeletal:      Cervical back: Neck supple. No rigidity. No muscular tenderness.      Right lower leg: No edema.      Left lower leg: No edema.      Comments: Right shoulder in splint   Skin:     Coloration: Skin is not jaundiced or pale.   Neurological:      Mental Status: He is alert and oriented to person, place, and time.      Coordination: Coordination normal.   Psychiatric:         Mood and  Affect: Mood normal.         Behavior: Behavior normal.       Fluids  Date 06/09/23 0700 - 06/10/23 0659   Shift 1666-8220 6099-3796 0208-3230 24 Hour Total   INTAKE   I.V. 1500   1500   Shift Total 1500   1500   OUTPUT   Blood 55   55   Shift Total 55   55   Weight (kg) 98.7 98.7 98.7 98.7     Laboratory  Recent Labs     06/09/23  1754   WBC 7.8   RBC 3.05*   HEMOGLOBIN 9.6*   HEMATOCRIT 30.8*   .0*   MCH 31.5   MCHC 31.2*   RDW 51.7*   PLATELETCT 205   MPV 9.7     Recent Labs     06/09/23  1754   SODIUM 130*   POTASSIUM 4.9   CHLORIDE 98   CO2 21   GLUCOSE 181*   BUN 26*   CREATININE 1.39   CALCIUM 8.6     Imaging  DX-CHEST-PORTABLE (1 VIEW)   Final Result      1.  Elevation of the right hemidiaphragm with right basilar atelectasis. No focal consolidation. No large pleural effusions.   2.  Borderline cardiomegaly.         DX-SHOULDER 1 VIEW RIGHT    (Results Pending)     Assessment/Plan  * Postoperative hypoxia- (present on admission)  Assessment & Plan  History of obstructive sleep apnea  Likely multifactorial secondary to atelectasis, anesthesia /sedation  I will check chest x-ray, BNP echocardiography, EKG   Oxygen as needed, Respiratory protocol, Incentive spirometry     Paroxysmal atrial fibrillation (HCC)- (present on admission)  Assessment & Plan  Resume home diltiazem with hold parameters  Resume home apixaban when okay with orthopedics    Essential hypertension- (present on admission)  Assessment & Plan  Resume home losartan, diltiazem and terazosin with hold parameters    Sleep apnea- (present on admission)  Assessment & Plan  CPAP and oxygen as needed    Acquired hypothyroidism- (present on admission)  Assessment & Plan  Resume home levothyroxine      SCDs, home apixaban on hold due to surgery 6/9

## 2023-06-23 ENCOUNTER — APPOINTMENT (OUTPATIENT)
Dept: ADMISSIONS | Facility: MEDICAL CENTER | Age: 83
DRG: 517 | End: 2023-06-23
Attending: ORTHOPAEDIC SURGERY
Payer: MEDICARE

## 2023-06-26 ENCOUNTER — PRE-ADMISSION TESTING (OUTPATIENT)
Dept: ADMISSIONS | Facility: MEDICAL CENTER | Age: 83
DRG: 517 | End: 2023-06-26
Attending: ORTHOPAEDIC SURGERY
Payer: MEDICARE

## 2023-06-26 VITALS — HEIGHT: 68 IN | BODY MASS INDEX: 33.09 KG/M2

## 2023-06-26 NOTE — PREPROCEDURE INSTRUCTIONS
Telephone Pre admit appointment. History and medications reviewed. Pre-op instructions given, hand outs reviewed and questions answered. Pre admit video emailed.    Anesthesia fasting guidelines reviewed. Patient instructed to continue regularly prescribed medications through the day before surgery. Per anesthesia protocol, patient instructed to take these medications with a sip of water the day of surgery: patient states he will be taking synthroid and prozac.    Patient instructed to bring CPAP, wife states she will also bring portable oxygen. Wife to  CHG soap. Declined questions for .    Patient reported he took his eliquis this morning because he forgot to stop it. Patient educated to stop per  Instructions. Madelyn at Dr. Ward's office notified and to discuss with  And patient.

## 2023-06-26 NOTE — DISCHARGE PLANNING
DISCHARGE PLANNING NOTE - TOTAL JOINT    Procedure: Procedure(s):  REVISION RIGHT REVERSE TOTAL SHOULDER ARTHROPLASTY  Procedure Date: 6/28/2023  Insurance: Payor: MEDICARE / Plan: MEDICARE PART A & B / Product Type: *No Product type* /    Equipment currently available at home?   Polar ice, recliner  Steps into the home? 1  Steps within the home? 0  Toilet height? ADA  Type of shower? walk-in shower  Who will be with you during your recovery? spouse  Is Outpatient Physical Therapy set up after surgery? No   Did you take the Total Joint Class and where? Yes  Planning same day discharge?No     Pt's spouse is here for CHG kit and does not want to meet with this writer as pt had this shoulder operated on on 6/9 and fell requiring this procedure.No dc needs identified at this time. Anticipate dc to home without barriers.

## 2023-06-28 ENCOUNTER — HOSPITAL ENCOUNTER (INPATIENT)
Facility: MEDICAL CENTER | Age: 83
LOS: 1 days | DRG: 517 | End: 2023-06-28
Attending: ORTHOPAEDIC SURGERY | Admitting: ORTHOPAEDIC SURGERY
Payer: MEDICARE

## 2023-06-28 ENCOUNTER — APPOINTMENT (OUTPATIENT)
Dept: RADIOLOGY | Facility: MEDICAL CENTER | Age: 83
DRG: 517 | End: 2023-06-28
Attending: ORTHOPAEDIC SURGERY
Payer: MEDICARE

## 2023-06-28 ENCOUNTER — ANESTHESIA EVENT (OUTPATIENT)
Dept: SURGERY | Facility: MEDICAL CENTER | Age: 83
DRG: 517 | End: 2023-06-28
Payer: MEDICARE

## 2023-06-28 ENCOUNTER — ANESTHESIA (OUTPATIENT)
Dept: SURGERY | Facility: MEDICAL CENTER | Age: 83
DRG: 517 | End: 2023-06-28
Payer: MEDICARE

## 2023-06-28 VITALS
WEIGHT: 216.05 LBS | RESPIRATION RATE: 16 BRPM | OXYGEN SATURATION: 97 % | SYSTOLIC BLOOD PRESSURE: 113 MMHG | BODY MASS INDEX: 32.85 KG/M2 | HEART RATE: 82 BPM | DIASTOLIC BLOOD PRESSURE: 58 MMHG | TEMPERATURE: 98.2 F

## 2023-06-28 DIAGNOSIS — Z96.611 H/O TOTAL SHOULDER REPLACEMENT, RIGHT: ICD-10-CM

## 2023-06-28 PROCEDURE — 700102 HCHG RX REV CODE 250 W/ 637 OVERRIDE(OP): Performed by: ANESTHESIOLOGY

## 2023-06-28 PROCEDURE — 306637 HCHG MISC ORTHO ITEM RC 0274

## 2023-06-28 PROCEDURE — 160036 HCHG PACU - EA ADDL 30 MINS PHASE I: Performed by: ORTHOPAEDIC SURGERY

## 2023-06-28 PROCEDURE — 160025 RECOVERY II MINUTES (STATS): Performed by: ORTHOPAEDIC SURGERY

## 2023-06-28 PROCEDURE — A9270 NON-COVERED ITEM OR SERVICE: HCPCS | Performed by: ANESTHESIOLOGY

## 2023-06-28 PROCEDURE — 700111 HCHG RX REV CODE 636 W/ 250 OVERRIDE (IP): Mod: JZ | Performed by: ANESTHESIOLOGY

## 2023-06-28 PROCEDURE — 0RWJ0JZ REVISION OF SYNTHETIC SUBSTITUTE IN RIGHT SHOULDER JOINT, OPEN APPROACH: ICD-10-PCS | Performed by: ORTHOPAEDIC SURGERY

## 2023-06-28 PROCEDURE — 160048 HCHG OR STATISTICAL LEVEL 1-5: Performed by: ORTHOPAEDIC SURGERY

## 2023-06-28 PROCEDURE — 160002 HCHG RECOVERY MINUTES (STAT): Performed by: ORTHOPAEDIC SURGERY

## 2023-06-28 PROCEDURE — 700105 HCHG RX REV CODE 258: Mod: JZ | Performed by: ORTHOPAEDIC SURGERY

## 2023-06-28 PROCEDURE — 160009 HCHG ANES TIME/MIN: Performed by: ORTHOPAEDIC SURGERY

## 2023-06-28 PROCEDURE — 160035 HCHG PACU - 1ST 60 MINS PHASE I: Performed by: ORTHOPAEDIC SURGERY

## 2023-06-28 PROCEDURE — 160029 HCHG SURGERY MINUTES - 1ST 30 MINS LEVEL 4: Performed by: ORTHOPAEDIC SURGERY

## 2023-06-28 PROCEDURE — 160046 HCHG PACU - 1ST 60 MINS PHASE II: Performed by: ORTHOPAEDIC SURGERY

## 2023-06-28 PROCEDURE — 99100 ANES PT EXTEME AGE<1 YR&>70: CPT | Performed by: ANESTHESIOLOGY

## 2023-06-28 PROCEDURE — 160041 HCHG SURGERY MINUTES - EA ADDL 1 MIN LEVEL 4: Performed by: ORTHOPAEDIC SURGERY

## 2023-06-28 PROCEDURE — 700111 HCHG RX REV CODE 636 W/ 250 OVERRIDE (IP): Mod: JZ | Performed by: ORTHOPAEDIC SURGERY

## 2023-06-28 PROCEDURE — 73030 X-RAY EXAM OF SHOULDER: CPT | Mod: RT

## 2023-06-28 PROCEDURE — 64415 NJX AA&/STRD BRCH PLXS IMG: CPT | Mod: XU | Performed by: ORTHOPAEDIC SURGERY

## 2023-06-28 PROCEDURE — 01638 ANES OPN/ARTHR TOT SHO RPLCM: CPT | Performed by: ANESTHESIOLOGY

## 2023-06-28 PROCEDURE — 700101 HCHG RX REV CODE 250: Performed by: ORTHOPAEDIC SURGERY

## 2023-06-28 PROCEDURE — 64415 NJX AA&/STRD BRCH PLXS IMG: CPT | Mod: 59,RT | Performed by: ANESTHESIOLOGY

## 2023-06-28 PROCEDURE — 700111 HCHG RX REV CODE 636 W/ 250 OVERRIDE (IP): Performed by: ANESTHESIOLOGY

## 2023-06-28 PROCEDURE — 110371 HCHG SHELL REV 272: Performed by: ORTHOPAEDIC SURGERY

## 2023-06-28 PROCEDURE — 700101 HCHG RX REV CODE 250: Performed by: ANESTHESIOLOGY

## 2023-06-28 DEVICE — POWDER SURGICAL 5GM COLLAGEN CELLERATE RX (1EA): Type: IMPLANTABLE DEVICE | Site: SHOULDER | Status: FUNCTIONAL

## 2023-06-28 RX ORDER — SODIUM CHLORIDE, SODIUM LACTATE, POTASSIUM CHLORIDE, CALCIUM CHLORIDE 600; 310; 30; 20 MG/100ML; MG/100ML; MG/100ML; MG/100ML
INJECTION, SOLUTION INTRAVENOUS CONTINUOUS
Status: DISCONTINUED | OUTPATIENT
Start: 2023-06-28 | End: 2023-06-28 | Stop reason: HOSPADM

## 2023-06-28 RX ORDER — VANCOMYCIN HYDROCHLORIDE 1 G/20ML
INJECTION, POWDER, LYOPHILIZED, FOR SOLUTION INTRAVENOUS
Status: COMPLETED | OUTPATIENT
Start: 2023-06-28 | End: 2023-06-28

## 2023-06-28 RX ORDER — BUPIVACAINE HYDROCHLORIDE AND EPINEPHRINE 5; 5 MG/ML; UG/ML
INJECTION, SOLUTION EPIDURAL; INTRACAUDAL; PERINEURAL
Status: DISCONTINUED | OUTPATIENT
Start: 2023-06-28 | End: 2023-06-28 | Stop reason: HOSPADM

## 2023-06-28 RX ORDER — HYDROMORPHONE HYDROCHLORIDE 1 MG/ML
0.2 INJECTION, SOLUTION INTRAMUSCULAR; INTRAVENOUS; SUBCUTANEOUS
Status: DISCONTINUED | OUTPATIENT
Start: 2023-06-28 | End: 2023-06-28 | Stop reason: HOSPADM

## 2023-06-28 RX ORDER — BUPIVACAINE HYDROCHLORIDE 2.5 MG/ML
INJECTION, SOLUTION EPIDURAL; INFILTRATION; INTRACAUDAL
Status: DISCONTINUED
Start: 2023-06-28 | End: 2023-06-28 | Stop reason: HOSPADM

## 2023-06-28 RX ORDER — TRANEXAMIC ACID 100 MG/ML
INJECTION, SOLUTION INTRAVENOUS PRN
Status: DISCONTINUED | OUTPATIENT
Start: 2023-06-28 | End: 2023-06-28 | Stop reason: SURG

## 2023-06-28 RX ORDER — MEPERIDINE HYDROCHLORIDE 25 MG/ML
INJECTION INTRAMUSCULAR; INTRAVENOUS; SUBCUTANEOUS PRN
Status: DISCONTINUED | OUTPATIENT
Start: 2023-06-28 | End: 2023-06-28 | Stop reason: SURG

## 2023-06-28 RX ORDER — LIDOCAINE HYDROCHLORIDE 40 MG/ML
SOLUTION TOPICAL PRN
Status: DISCONTINUED | OUTPATIENT
Start: 2023-06-28 | End: 2023-06-28 | Stop reason: SURG

## 2023-06-28 RX ORDER — HYDROMORPHONE HYDROCHLORIDE 1 MG/ML
0.1 INJECTION, SOLUTION INTRAMUSCULAR; INTRAVENOUS; SUBCUTANEOUS
Status: DISCONTINUED | OUTPATIENT
Start: 2023-06-28 | End: 2023-06-28 | Stop reason: HOSPADM

## 2023-06-28 RX ORDER — DEXAMETHASONE SODIUM PHOSPHATE 4 MG/ML
INJECTION, SOLUTION INTRA-ARTICULAR; INTRALESIONAL; INTRAMUSCULAR; INTRAVENOUS; SOFT TISSUE PRN
Status: DISCONTINUED | OUTPATIENT
Start: 2023-06-28 | End: 2023-06-28 | Stop reason: SURG

## 2023-06-28 RX ORDER — SODIUM CHLORIDE, SODIUM LACTATE, POTASSIUM CHLORIDE, CALCIUM CHLORIDE 600; 310; 30; 20 MG/100ML; MG/100ML; MG/100ML; MG/100ML
INJECTION, SOLUTION INTRAVENOUS CONTINUOUS
Status: ACTIVE | OUTPATIENT
Start: 2023-06-28 | End: 2023-06-28

## 2023-06-28 RX ORDER — HALOPERIDOL 5 MG/ML
1 INJECTION INTRAMUSCULAR
Status: DISCONTINUED | OUTPATIENT
Start: 2023-06-28 | End: 2023-06-28 | Stop reason: HOSPADM

## 2023-06-28 RX ORDER — HYDROMORPHONE HYDROCHLORIDE 1 MG/ML
0.4 INJECTION, SOLUTION INTRAMUSCULAR; INTRAVENOUS; SUBCUTANEOUS
Status: DISCONTINUED | OUTPATIENT
Start: 2023-06-28 | End: 2023-06-28 | Stop reason: HOSPADM

## 2023-06-28 RX ORDER — DIPHENHYDRAMINE HYDROCHLORIDE 50 MG/ML
12.5 INJECTION INTRAMUSCULAR; INTRAVENOUS
Status: DISCONTINUED | OUTPATIENT
Start: 2023-06-28 | End: 2023-06-28 | Stop reason: HOSPADM

## 2023-06-28 RX ORDER — ONDANSETRON 2 MG/ML
4 INJECTION INTRAMUSCULAR; INTRAVENOUS
Status: DISCONTINUED | OUTPATIENT
Start: 2023-06-28 | End: 2023-06-28 | Stop reason: HOSPADM

## 2023-06-28 RX ORDER — CEFAZOLIN SODIUM 1 G/3ML
INJECTION, POWDER, FOR SOLUTION INTRAMUSCULAR; INTRAVENOUS PRN
Status: DISCONTINUED | OUTPATIENT
Start: 2023-06-28 | End: 2023-06-28 | Stop reason: SURG

## 2023-06-28 RX ORDER — MEPERIDINE HYDROCHLORIDE 25 MG/ML
6.25 INJECTION INTRAMUSCULAR; INTRAVENOUS; SUBCUTANEOUS
Status: DISCONTINUED | OUTPATIENT
Start: 2023-06-28 | End: 2023-06-28 | Stop reason: HOSPADM

## 2023-06-28 RX ORDER — OXYCODONE HCL 5 MG/5 ML
10 SOLUTION, ORAL ORAL
Status: COMPLETED | OUTPATIENT
Start: 2023-06-28 | End: 2023-06-28

## 2023-06-28 RX ORDER — OXYCODONE HCL 5 MG/5 ML
5 SOLUTION, ORAL ORAL
Status: COMPLETED | OUTPATIENT
Start: 2023-06-28 | End: 2023-06-28

## 2023-06-28 RX ADMIN — BUPIVACAINE HYDROCHLORIDE 20 ML: 2.5 INJECTION, SOLUTION EPIDURAL; INFILTRATION; INTRACAUDAL; PERINEURAL at 11:47

## 2023-06-28 RX ADMIN — LIDOCAINE HYDROCHLORIDE 4 ML: 40 SOLUTION TOPICAL at 12:07

## 2023-06-28 RX ADMIN — SODIUM CHLORIDE, POTASSIUM CHLORIDE, SODIUM LACTATE AND CALCIUM CHLORIDE: 600; 310; 30; 20 INJECTION, SOLUTION INTRAVENOUS at 13:24

## 2023-06-28 RX ADMIN — CEFAZOLIN 2 G: 1 INJECTION, POWDER, FOR SOLUTION INTRAMUSCULAR; INTRAVENOUS at 12:14

## 2023-06-28 RX ADMIN — PROPOFOL 200 MG: 10 INJECTION, EMULSION INTRAVENOUS at 12:06

## 2023-06-28 RX ADMIN — TRANEXAMIC ACID 1000 MG: 100 INJECTION, SOLUTION INTRAVENOUS at 13:19

## 2023-06-28 RX ADMIN — TRANEXAMIC ACID 1000 MG: 100 INJECTION, SOLUTION INTRAVENOUS at 12:14

## 2023-06-28 RX ADMIN — FENTANYL CITRATE 100 MCG: 50 INJECTION, SOLUTION INTRAMUSCULAR; INTRAVENOUS at 12:31

## 2023-06-28 RX ADMIN — MEPERIDINE HYDROCHLORIDE 25 MG: 25 INJECTION INTRAMUSCULAR; INTRAVENOUS; SUBCUTANEOUS at 13:14

## 2023-06-28 RX ADMIN — DEXAMETHASONE SODIUM PHOSPHATE 4 MG: 4 INJECTION INTRA-ARTICULAR; INTRALESIONAL; INTRAMUSCULAR; INTRAVENOUS; SOFT TISSUE at 12:23

## 2023-06-28 RX ADMIN — Medication 120 MG: at 12:06

## 2023-06-28 RX ADMIN — SODIUM CHLORIDE, POTASSIUM CHLORIDE, SODIUM LACTATE AND CALCIUM CHLORIDE: 600; 310; 30; 20 INJECTION, SOLUTION INTRAVENOUS at 12:01

## 2023-06-28 RX ADMIN — OXYCODONE HYDROCHLORIDE 10 MG: 5 SOLUTION ORAL at 13:50

## 2023-06-28 RX ADMIN — EPHEDRINE SULFATE 15 MG: 50 INJECTION, SOLUTION INTRAVENOUS at 12:40

## 2023-06-28 RX ADMIN — FENTANYL CITRATE 50 MCG: 50 INJECTION, SOLUTION INTRAMUSCULAR; INTRAVENOUS at 13:50

## 2023-06-28 ASSESSMENT — PAIN DESCRIPTION - PAIN TYPE
TYPE: SURGICAL PAIN

## 2023-06-28 ASSESSMENT — FIBROSIS 4 INDEX: FIB4 SCORE: 1.75

## 2023-06-28 NOTE — ANESTHESIA PROCEDURE NOTES
Peripheral Block    Date/Time: 6/28/2023 11:47 AM    Performed by: Kenn Gómez M.D.  Authorized by: Kenn Gómez M.D.    Patient Location:  OR  Start Time:  6/28/2023 11:47 AM  End Time:  6/28/2023 11:49 AM  Reason for Block: at surgeon's request and post-op pain management ONLY    patient identified, IV checked, site marked, risks and benefits discussed, surgical consent, monitors and equipment checked, pre-op evaluation and timeout performed    Patient Position:  Supine  Prep: ChloraPrep    Monitoring:  Heart rate, continuous pulse ox and cardiac monitor  Block Region:  Upper Extremity  Upper Extremity - Block Type:  BRACHIAL PLEXUS block, Interscalene approach    Laterality:  Right  Procedures: ultrasound guided  Image captured, interpreted and electronically stored.  Strength:  1 %  Dose:  3 ml  Block Type:  Single-shot  Needle Length:  50mm  Needle Gauge:  22 G  Needle Localization:  Ultrasound guidance  Injection Assessment:  Negative aspiration for heme, no paresthesia on injection, incremental injection and local visualized surrounding nerve on ultrasound   US Guided Interscalene Brachial Plexus Block   US transducer placed on the neck in oblique plane approximately at the level of C6.  Anterior and Middle Scalene (MSM) muscles identified with nerve trunks identified between the muscles.  Needle inserted lateral to probe and advanced under direct visualization through the MSM into a perineural position.  After negative aspiration, LA injected with ease and visualized surrounding the nerve trunks.

## 2023-06-28 NOTE — DISCHARGE INSTRUCTIONS
If any questions arise, call your provider.  If your provider is not available, please feel free to call the Surgical Center at [phone].    MEDICATIONS: Resume taking daily medication.  Take prescribed pain medication with food.  If no medication is prescribed, you may take non-aspirin pain medication if needed.  PAIN MEDICATION CAN BE VERY CONSTIPATING.  Take a stool softener or laxative such as senokot, pericolace, or milk of magnesia if needed.    Last pain medication given at Oxycodone 10 mg 1:50 pm    What to Expect Post Anesthesia    Rest and take it easy for the first 24 hours.  A responsible adult is recommended to remain with you during that time.  It is normal to feel sleepy.  We encourage you to not do anything that requires balance, judgment or coordination.    FOR 24 HOURS DO NOT:  Drive, operate machinery or run household appliances.  Drink beer or alcoholic beverages.  Make important decisions or sign legal documents.    To avoid nausea, slowly advance diet as tolerated, avoiding spicy or greasy foods for the first day.  Add more substantial food to your diet according to your provider's instructions.  Babies can be fed formula or breast milk as soon as they are hungry.  INCREASE FLUIDS AND FIBER TO AVOID CONSTIPATION.    MILD FLU-LIKE SYMPTOMS ARE NORMAL.  YOU MAY EXPERIENCE GENERALIZED MUSCLE ACHES, THROAT IRRITATION, HEADACHE AND/OR SOME NAUSEA.    Keep dressings clean and intact   You may shower with the bandages in place   Do not soak the wound   Apply ice as much as possible   Do not operate a vehicle or machinery while taking narcotic pain medications   Return to clinic in 10-14 days   Please call the office with any questions [phone]   No external rotation past neutral   No pushing up from a seated position   No lifting >2 lbs with the operative extremity   None weight bearing to operative extremity

## 2023-06-28 NOTE — OR NURSING
1431:  Received report and assumed care. Pt on gurney resting with eyes closed.  VSS.    1445:  Pt off of CPAP.  87% on RA.  Placed on 2L NC.  O2 sats 97.  Tolerating sips of water.    1505:  Pt meets criteria for stage 2.  Waiting to give report.

## 2023-06-28 NOTE — ANESTHESIA PROCEDURE NOTES
Airway    Date/Time: 6/28/2023 12:07 PM    Performed by: Kenn Gómez M.D.  Authorized by: Kenn Gómez M.D.    Location:  OR  Urgency:  Elective  Difficult Airway: No    Indications for Airway Management:  Anesthesia      Spontaneous Ventilation: absent    Sedation Level:  Deep  Preoxygenated: Yes    Patient Position:  Sniffing  Mask Difficulty Assessment:  0 - not attempted  Final Airway Type:  Endotracheal airway  Final Endotracheal Airway:  ETT  Cuffed: Yes    Technique Used for Successful ETT Placement:  Direct laryngoscopy    Insertion Site:  Oral  Blade Type:  Morgan  Laryngoscope Blade/Videolaryngoscope Blade Size:  3  ETT Size (mm):  7.0  Measured from:  Teeth  ETT to Teeth (cm):  23  Placement Verified by: auscultation and capnometry    Cormack-Lehane Classification:  Grade I - full view of glottis  Number of Attempts at Approach:  1

## 2023-06-28 NOTE — ANESTHESIA PREPROCEDURE EVALUATION
Case: [id number] Date/Time: 06/28/23 1108    Procedure: REVISION RIGHT REVERSE TOTAL SHOULDER ARTHROPLASTY (Right: Shoulder)    Anesthesia type: General    Pre-op diagnosis: CLOSED FRACTURE OF PROXIMAL RIGHT HUMEROUS    Location: [facility] OR 02 / SURGERY [facility]    Surgeons: Shawn Ward M.D.          Relevant Problems   ANESTHESIA   (positive) Sleep apnea      PULMONARY   (positive) Dyspnea on exertion      CARDIAC   (positive) Dyspnea on exertion   (positive) Essential hypertension   (positive) Paroxysmal atrial fibrillation (HCC)         (positive) MUNA (acute kidney injury) (HCC)   (positive) Chronic renal impairment, stage 1      ENDO   (positive) Acquired hypothyroidism     Wt 98 kg (216 lb 0.8 oz)   BMI 32.85 kg/m²     Physical Exam    Airway   Mallampati: II  TM distance: >3 FB  Neck ROM: full       Cardiovascular - normal exam  Rhythm: regular  Rate: normal  (-) murmur     Dental - normal exam           Pulmonary - normal exam  Breath sounds clear to auscultation     Abdominal    Neurological - normal exam                 Anesthesia Plan    ASA 3       Plan - general and peripheral nerve block     Peripheral nerve block will be post-op pain control  Airway plan will be ETT          Induction: intravenous    Postoperative Plan: Postoperative administration of opioids is intended.    Pertinent diagnostic labs and testing reviewed    Informed Consent:    Anesthetic plan and risks discussed with patient.    Use of blood products discussed with: patient whom consented to blood products.

## 2023-06-28 NOTE — ANESTHESIA TIME REPORT
Anesthesia Start and Stop Event Times     Date Time Event    6/28/2023 1138 Ready for Procedure     1201 Anesthesia Start     1333 Anesthesia Stop        Responsible Staff  06/28/23    Name Role Begin End    Kenn Gómez M.D. Anesth 1201 1333        Overtime Reason:  no overtime (within assigned shift)    Comments:

## 2023-06-28 NOTE — OR NURSING
1518: To stage ll. Up to chair and dressed w/ CNA assist. Pain is tolerable. No nausea.    1556: Home care instructions reviewed w/ pt and wife. Questions, concerns addressed. Meets criteria for discharge. Pt has home 02.

## 2023-06-28 NOTE — OR NURSING
1330- Patient arrived from OR via gurney.  R shoulder dressing CDI; radial pulse 2+. Cold pack applied to R shoulder.     Stop Bang per protocol.    Sedation/Resp Status: Non responsive to verbal with OPA in place. Respirations spontaneous and non-labored.    HR 84SR; VSS on 10L 02 via simple mask.  The patient does not appear to be in pain or nauseous at this time as evidence by sleeping.     1304- OPA out for return of spontaneous eye opening/gag reflex - respirations continue spontaneous and non-labored.     1345- 7/10 pain reported. Will grab meds. No nausea reported. The dressing is CDI.    1350- Medicated per MAR for 7/10 pain    1357- Xray tech at bedside getting imaging of the shoulder.    1400- Tolerable 5/10 pain reported. The dressing is unchanged. No nausea reported.    1427- Left a voicemail    1430- No change in the patient condition. The dressing is CDI    1431- Handoff report given to Dimas    1505- Received report back from Dimas DELEON    1517- Report called to Primitivo DELEON    1518- The patient was transported to phase 2 for DC

## 2023-06-29 NOTE — ANESTHESIA POSTPROCEDURE EVALUATION
Patient: Donny Haas    Procedure Summary     Date: 06/28/23 Room / Location:  OR 02 / SURGERY [facility]    Anesthesia Start: 1201 Anesthesia Stop: 1333    Procedure: REVISION RIGHT REVERSE TOTAL SHOULDER ARTHROPLASTY (Right: Shoulder) Diagnosis: (CLOSED FRACTURE OF PROXIMAL RIGHT HUMEROUS)    Surgeons: Shawn Ward M.D. Responsible Provider: Kenn Gómez M.D.    Anesthesia Type: general, peripheral nerve block ASA Status: 3          Final Anesthesia Type: general, peripheral nerve block  Last vitals  BP   Blood Pressure : 113/58    Temp   36.8 °C (98.2 °F)    Pulse   82   Resp   16    SpO2   97 %      Anesthesia Post Evaluation    Patient location during evaluation: PACU  Patient participation: complete - patient participated  Level of consciousness: awake and alert    Airway patency: patent  Anesthetic complications: no  Cardiovascular status: hemodynamically stable  Respiratory status: face mask    PONV: none          No notable events documented.     Nurse Pain Score: 2 (NPRS)

## 2023-06-30 NOTE — OP REPORT
DATE OF SERVICE:  06/28/2023     PREOPERATIVE DIAGNOSIS:  Right shoulder dislocation, status post reverse total   shoulder arthroplasty.     POSTOPERATIVE DIAGNOSIS:  Right shoulder dislocation, status post reverse   total shoulder arthroplasty.     SURGERY PERFORMED:  Open reduction, right shoulder joint, status post reverse   total shoulder arthroplasty.     ATTENDING SURGEON:  Shawn Ward MD     ANESTHESIA:  General.     FIRST ASSISTANT:  Annabella Mcguire, certified first assist.     ESTIMATED BLOOD LOSS:  200 mL.     COMPLICATIONS:  None.     INDICATIONS FOR PROCEDURE:  The patient is a pleasant gentleman who sustained   a right proximal humerus fracture, which resulted in the need for a right   reverse total shoulder arthroplasty for fracture.  This was performed by   myself roughly 3 weeks ago.  He presented to my office last week where he was   coming in for a routine 2-week postoperative visit.  It was noted at that time   that he had a dislocation of the shoulder.  He did report having an episode   of feeling lightheaded and reports falling shortly after the surgery.  He did   not report this to myself and did not present to an outside urgent care or   emergency room either.  He has had no syncopal episodes or no other symptoms   of weakness or cardiac issues since that time.  Due to the nature of the   dislocation, the decision was made to take him to the operating room today for   a revision right reverse total shoulder arthroplasty versus an open   reduction.     DESCRIPTION OF PROCEDURE:  On the day of surgery, the patient was seen in the   preoperative area where informed consent was obtained with all risks and   benefits of the procedure explained and all questions answered.  He wished to   proceed with the surgery.  The proper site was marked.  He was subsequently   taken to the operating room and placed in the supine position with all bony   prominences well padded.  General endotracheal  anesthesia was induced.  He was   then placed into a slightly elevated beach chair position and the right upper   extremity was then prepped and draped in the usual sterile fashion.     A timeout was performed with all persons in attendance agreeing on the proper   patient, proper surgical site, and proper surgery to be performed.     A standard deltopectoral approach was performed in line with the previous   surgical incision.  Sharp and blunt dissection was taken down to subcutaneous   tissues.  Bleeding was well controlled using Bovie electrocautery.  Gelpi   retractors was then placed in position and I was able to dissect through the   joint capsule.  Hematoma from the prior surgery was removed.  The joint was   thoroughly irrigated with copious amounts of normal saline.  Once the joint   was visualized, I then began freeing up scar tissue around the area and began   releasing soft tissues adjacent to the humerus in order to allow for an open   reduction to be performed.  Prior to this, I was able to visualize the   polyethylene and there was no evidence of damage to the polyethylene liner   itself.  I was then able to reduce the joint with great effort.  This was   quite challenging in order to get the joint reduced but once it was reduced,   it was quite stable in all planes.  Because of the challenge in reducing the   joint and because of the significant stability once the joint was reduced, I   elected not to perform a revision reverse total shoulder arthroplasty but to   ceased with an open reduction at this time.     The wound was then thoroughly irrigated with copious amounts of normal saline.    Vancomycin powder and ___ were placed into the wound to help decrease the   risk of joint infection as well as to decrease postoperative inflammation.    The deltopectoral interval was repaired using #2 FiberWire.  The subcutaneous   tissues were repaired using 3-0 Monocryl.  Skin was repaired using a ZipLine    wound closure device.  The wound was then dressed with an Aquacel dressing.    The right upper extremity was placed into a shoulder immobilizer.  General   endotracheal anesthesia was reversed and he was taken to the PACU in good and   stable condition.     DISPOSITION:  He will be discharged home on a regular diet.  He should wear   the sling at all times.  He will continue with pendulum exercises as he was   doing previously.  He may shower with the bandage in place but should not soak   the wound.  The bandage will remain intact until he returns to clinic in 2   weeks' time.  At that time, we will discontinue the sling and begin physical   therapy for passive range of motion only.  He will not be allowed external   rotation past neutral and may not reach and should not extend the shoulder   past neutral either.  He was prescribed narcotic pain medication and   instructed not to drive or operate machinery while taking this medication.        ______________________________  MD YVAN Michel/JESUS MANUEL    DD:  06/29/2023 22:36  DT:  06/29/2023 23:15    Job#:  [id number]

## 2023-08-19 DIAGNOSIS — I48.0 PAROXYSMAL ATRIAL FIBRILLATION (HCC): ICD-10-CM

## 2023-08-19 DIAGNOSIS — Z79.899 HIGH RISK MEDICATION USE: ICD-10-CM

## 2023-08-21 RX ORDER — APIXABAN 5 MG/1
5 TABLET, FILM COATED ORAL 2 TIMES DAILY
Qty: 180 TABLET | Refills: 0 | Status: SHIPPED | OUTPATIENT
Start: 2023-08-21 | End: 2023-08-24 | Stop reason: SDUPTHER

## 2023-08-24 ENCOUNTER — OFFICE VISIT (OUTPATIENT)
Dept: CARDIOLOGY | Facility: MEDICAL CENTER | Age: 83
End: 2023-08-24
Attending: NURSE PRACTITIONER
Payer: MEDICARE

## 2023-08-24 VITALS
WEIGHT: 216 LBS | HEART RATE: 75 BPM | RESPIRATION RATE: 16 BRPM | SYSTOLIC BLOOD PRESSURE: 110 MMHG | BODY MASS INDEX: 32.74 KG/M2 | OXYGEN SATURATION: 90 % | DIASTOLIC BLOOD PRESSURE: 62 MMHG | HEIGHT: 68 IN

## 2023-08-24 DIAGNOSIS — N18.1 CHRONIC RENAL IMPAIRMENT, STAGE 1: ICD-10-CM

## 2023-08-24 DIAGNOSIS — E66.9 OBESITY (BMI 30-39.9): ICD-10-CM

## 2023-08-24 DIAGNOSIS — G47.30 SLEEP APNEA, UNSPECIFIED TYPE: ICD-10-CM

## 2023-08-24 DIAGNOSIS — I10 ESSENTIAL HYPERTENSION: ICD-10-CM

## 2023-08-24 DIAGNOSIS — I48.0 PAROXYSMAL ATRIAL FIBRILLATION (HCC): ICD-10-CM

## 2023-08-24 DIAGNOSIS — E78.5 HYPERLIPIDEMIA WITH TARGET LDL LESS THAN 100: ICD-10-CM

## 2023-08-24 PROBLEM — R53.83 OTHER FATIGUE: Status: RESOLVED | Noted: 2019-06-25 | Resolved: 2023-08-24

## 2023-08-24 PROBLEM — J32.9 SINUSITIS: Status: RESOLVED | Noted: 2022-11-29 | Resolved: 2023-08-24

## 2023-08-24 PROBLEM — N17.9 AKI (ACUTE KIDNEY INJURY) (HCC): Status: RESOLVED | Noted: 2022-11-29 | Resolved: 2023-08-24

## 2023-08-24 PROBLEM — T78.3XXA ANGIOEDEMA OF LIPS: Status: RESOLVED | Noted: 2017-07-20 | Resolved: 2023-08-24

## 2023-08-24 PROBLEM — N40.0 BENIGN NODULAR PROSTATIC HYPERPLASIA: Status: RESOLVED | Noted: 2017-08-25 | Resolved: 2023-08-24

## 2023-08-24 PROBLEM — R06.09 DYSPNEA ON EXERTION: Status: RESOLVED | Noted: 2022-11-29 | Resolved: 2023-08-24

## 2023-08-24 PROBLEM — H66.91 ACUTE RIGHT OTITIS MEDIA: Status: RESOLVED | Noted: 2017-06-22 | Resolved: 2023-08-24

## 2023-08-24 PROBLEM — L30.9 DERMATITIS: Status: RESOLVED | Noted: 2020-02-07 | Resolved: 2023-08-24

## 2023-08-24 PROBLEM — M25.551 PAIN OF RIGHT HIP JOINT: Status: RESOLVED | Noted: 2019-06-25 | Resolved: 2023-08-24

## 2023-08-24 PROBLEM — Z00.00 PREVENTATIVE HEALTH CARE: Status: RESOLVED | Noted: 2017-05-25 | Resolved: 2023-08-24

## 2023-08-24 PROBLEM — R09.02 HYPOXIA: Status: RESOLVED | Noted: 2022-11-29 | Resolved: 2023-08-24

## 2023-08-24 PROBLEM — I49.9 IRREGULAR HEART RATE: Status: RESOLVED | Noted: 2019-06-25 | Resolved: 2023-08-24

## 2023-08-24 PROBLEM — Z02.5 SPORTS PHYSICAL: Status: RESOLVED | Noted: 2018-05-03 | Resolved: 2023-08-24

## 2023-08-24 PROBLEM — Z48.02 ENCOUNTER FOR REMOVAL OF SUTURES: Status: RESOLVED | Noted: 2017-10-12 | Resolved: 2023-08-24

## 2023-08-24 PROBLEM — R79.89 TROPONIN LEVEL ELEVATED: Status: RESOLVED | Noted: 2022-11-29 | Resolved: 2023-08-24

## 2023-08-24 PROCEDURE — 3074F SYST BP LT 130 MM HG: CPT | Performed by: NURSE PRACTITIONER

## 2023-08-24 PROCEDURE — 3078F DIAST BP <80 MM HG: CPT | Performed by: NURSE PRACTITIONER

## 2023-08-24 PROCEDURE — 99214 OFFICE O/P EST MOD 30 MIN: CPT | Performed by: NURSE PRACTITIONER

## 2023-08-24 PROCEDURE — 99213 OFFICE O/P EST LOW 20 MIN: CPT | Performed by: NURSE PRACTITIONER

## 2023-08-24 RX ORDER — OXYCODONE HYDROCHLORIDE 5 MG/1
TABLET ORAL
COMMUNITY
Start: 2023-05-30

## 2023-08-24 RX ORDER — DOCUSATE SODIUM 50 MG AND SENNOSIDES 8.6 MG 8.6; 5 MG/1; MG/1
TABLET, FILM COATED ORAL
COMMUNITY
Start: 2023-05-31

## 2023-08-24 ASSESSMENT — FIBROSIS 4 INDEX: FIB4 SCORE: 1.75

## 2023-08-24 ASSESSMENT — ENCOUNTER SYMPTOMS
MYALGIAS: 0
SHORTNESS OF BREATH: 0
PALPITATIONS: 0
ORTHOPNEA: 0
FEVER: 0
ABDOMINAL PAIN: 0
DIZZINESS: 0
COUGH: 0
CLAUDICATION: 0
PND: 0

## 2023-08-24 NOTE — PROGRESS NOTES
Chief Complaint   Patient presents with    Atrial Fibrillation     F/V Dx: Paroxysmal atrial fibrillation (HCC)    Chest Pain    Shortness of Breath     Subjective     Edward Haas is a 83 y.o. male who presents today for annual follow up for afib.    He is a patient of Dr. Perez in our office. Hx of obesity with HLD, HTN with chronic kidney disease, PAF on chronic anticoagulation, and BENJA on CPAP.    He presents today with no issues. He had two events of afib, not noticed by symptoms but by his apple watch identifying them.    He is active and has no concerns with activity.    Recent falls this year but mechanical in nature.    He has no chest pain, shortness of breath, edema, dizziness/lightheadedness, or palpitations.    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 ago    Cataract     bilat sugery- IOL    Dental disorder     Crowns & implants- Missing tooth left upper next to wisdom, lower right just one from center (all wisdom teeth are gone)    Hemorrhagic disorder (HCC)     eliuquis    High cholesterol     Hyperlipidemia     Hypertension     BPs run between 110/60 - 145/70's very controlled with meds    Pain     3-4/10 pain right now in Right Shoulder (increases to 7/10 with movement)    Psychiatric problem     taking Fluoxetine    Sleep apnea     cpap- w/O2 at 2L at night    Thyroid disease     Hashimotos    Urinary incontinence     Occasionally drips- wears pad     Past Surgical History:   Procedure Laterality Date    PB RECONSTR TOTAL SHOULDER IMPLANT Right 6/28/2023    Procedure: REVISION RIGHT REVERSE TOTAL SHOULDER ARTHROPLASTY;  Surgeon: Shawn Ward M.D.;  Location: SURGERY [facility];  Service: Orthopedics    PB RECONSTR TOTAL SHOULDER IMPLANT Right 06/09/2023    Procedure: RIGHT REVERSE TOTAL SHOULDER ARTHROPLASTY;   Surgeon: Shawn Ward M.D.;  Location: SURGERY [facility];  Service: Orthopedics    LASER ABLATION      Cardiac ablation    BLEPHAROPLASTY Bilateral     CATARACT EXTRACTION WITH IOL      DENTAL SURGERY      HERNIA REPAIR Right     Inguinal Hernia surgery at 18 yrs old    OTHER      Mohs procedure for CA Skin    OTHER CARDIAC SURGERY  Ablation done 2021    OTHER ORTHOPEDIC SURGERY      thumb repair    TONSILLECTOMY       Family History   Problem Relation Age of Onset    Cancer Mother         breast cancer    Thyroid Sister         Hasimotos    Diabetes Sister         Type 2 DM    Thyroid Brother         Hasimotos    Diabetes Brother         Type 2 DM    No Known Problems Maternal Grandmother     Heart Attack Maternal Grandfather      Social History     Socioeconomic History    Marital status:      Spouse name: Not on file    Number of children: Not on file    Years of education: Not on file    Highest education level: Not on file   Occupational History    Not on file   Tobacco Use    Smoking status: Former     Current packs/day: 0.00     Average packs/day: 1 pack/day for 19.0 years (19.0 ttl pk-yrs)     Types: Cigarettes     Start date: 3/22/1960     Quit date: 3/22/1979     Years since quittin.4    Smokeless tobacco: Never   Vaping Use    Vaping Use: Never used   Substance and Sexual Activity    Alcohol use: Yes     Alcohol/week: 2.4 oz     Types: 4 Standard drinks or equivalent per week     Comment: 4 drinks a week.    Drug use: No    Sexual activity: Yes     Partners: Female   Other Topics Concern    Not on file   Social History Narrative    Not on file     Social Determinants of Health     Financial Resource Strain: Not on file   Food Insecurity: Not on file   Transportation Needs: Not on file   Physical Activity: Not on file   Stress: Not on file   Social Connections: Not on file   Intimate Partner Violence: Not on file   Housing Stability: Not on file     Allergies    Allergen Reactions    Penicillins Rash     rash    Penicillin G Rash and Unspecified    Latex Rash     Allergy test- very slight rash    Oxycodone-Acetaminophen Unspecified     Hallucinations per pt.      Outpatient Encounter Medications as of 8/24/2023   Medication Sig Dispense Refill    apixaban (ELIQUIS) 5mg Tab Take 1 Tablet by mouth 2 times a day. 180 Tablet 3    MATZIM  MG TABLET SR 24 HR Take 1 Tablet by mouth every day. In the afternoon      Folic Acid (FOLATE PO) Take 1 Tablet by mouth see administration instructions. Pt takes on Sunday, Mon, Wed, Fri, and Sat      Cholecalciferol ([employer] VITAMIN D3) 78394 UNIT Cap Take 10,000 Units by mouth see administration instructions. Pt takes on Monday, Wed, and Friday      terazosin (HYTRIN) 5 MG Cap Take 5 mg by mouth every evening.      Cyanocobalamin (VITAMIN B-12 PO) Take 1 Tablet by mouth every day.      levothyroxine (SYNTHROID) 112 MCG Tab Take 112 mcg by mouth every day.      FLUoxetine (PROZAC) 20 MG Cap Take 20 mg by mouth every morning.      losartan (COZAAR) 50 MG Tab Take 50 mg by mouth 2 times a day.      finasteride (PROSCAR) 5 MG Tab Take 5 mg by mouth every evening.      Multiple Vitamins-Minerals (ICAPS AREDS 2 PO) Take 1 Capsule by mouth 2 times a day.      atorvastatin (LIPITOR) 20 MG Tab TAKE 1 TABLET BY MOUTH EVERY DAY (Patient taking differently: Take 20 mg by mouth every evening.) 90 tablet 2    oxyCODONE immediate-release (ROXICODONE) 5 MG Tab TAKE 1 TO 2 TABLETS BY MOUTH EVERY 6 HOURS FOR 3 DAYS AS NEEDED      STIMULANT LAXATIVE 8.6-50 MG Tab TAKE 2 TABLETS BY MOUTH EVERY DAY AS NEEDED      [DISCONTINUED] ELIQUIS 5 MG Tab TAKE 1 TABLET BY MOUTH TWICE DAILY 180 Tablet 0     No facility-administered encounter medications on file as of 8/24/2023.     Review of Systems   Constitutional:  Negative for fever and malaise/fatigue.   Respiratory:  Negative for cough and shortness of breath.    Cardiovascular:  Negative for chest pain,  "palpitations, orthopnea, claudication, leg swelling and PND.   Gastrointestinal:  Negative for abdominal pain.   Musculoskeletal:  Negative for myalgias.   Neurological:  Negative for dizziness.              Objective     /62 (BP Location: Left arm, Patient Position: Sitting, BP Cuff Size: Adult)   Pulse 75   Resp 16   Ht 1.727 m (5' 8\")   Wt 98 kg (216 lb)   SpO2 90%   BMI 32.84 kg/m²     Physical Exam  Vitals and nursing note reviewed.   Constitutional:       Appearance: Normal appearance. He is well-developed and normal weight.   HENT:      Head: Normocephalic and atraumatic.   Neck:      Vascular: No JVD.   Cardiovascular:      Rate and Rhythm: Normal rate and regular rhythm.      Pulses: Normal pulses.      Heart sounds: Normal heart sounds.   Pulmonary:      Effort: Pulmonary effort is normal.      Breath sounds: Normal breath sounds.   Musculoskeletal:         General: Normal range of motion.   Skin:     General: Skin is warm and dry.      Capillary Refill: Capillary refill takes less than 2 seconds.   Neurological:      General: No focal deficit present.      Mental Status: He is alert and oriented to person, place, and time. Mental status is at baseline.   Psychiatric:         Mood and Affect: Mood normal.         Behavior: Behavior normal.         Thought Content: Thought content normal.         Judgment: Judgment normal.                Assessment & Plan     1. Paroxysmal atrial fibrillation (HCC)  apixaban (ELIQUIS) 5mg Tab    CBC WITHOUT DIFFERENTIAL      2. Sleep apnea, unspecified type        3. Hyperlipidemia with target LDL less than 100        4. Obesity (BMI 30-39.9)        5. Chronic renal impairment, stage 1        6. Essential hypertension          Medical Decision Making: Today's Assessment/Status/Plan:      1. PAF with prior ablation on chronic anticoagulation  -asymptomatic, rate controlled  -cont eliquis 5 mg BID  -cont matzim 180 mg QD  -follow    2. BENJA on CPAP  -tolerating and " compliant    3. Obesity with HLD  -stable on labs with atorvastatin 20 mg QPM  -annual lipid panel    4. HTN with CKD  -good control on hytrin 5 mg QPM, losartan 50 mg BID, and Matzim 180 mg QD  -BP goal <130/80  -BP meds all managed by PCP    Patient is to follow up with Inés WHEATLEY in 1 year with annual labs.

## 2023-08-29 ENCOUNTER — HOSPITAL ENCOUNTER (OUTPATIENT)
Dept: LAB | Facility: MEDICAL CENTER | Age: 83
End: 2023-08-29
Attending: NURSE PRACTITIONER
Payer: MEDICARE

## 2023-08-29 DIAGNOSIS — I48.0 PAROXYSMAL ATRIAL FIBRILLATION (HCC): ICD-10-CM

## 2023-08-29 LAB
ERYTHROCYTE [DISTWIDTH] IN BLOOD BY AUTOMATED COUNT: 52.2 FL (ref 35.9–50)
HCT VFR BLD AUTO: 35.7 % (ref 42–52)
HGB BLD-MCNC: 11.1 G/DL (ref 14–18)
MCH RBC QN AUTO: 30.7 PG (ref 27–33)
MCHC RBC AUTO-ENTMCNC: 31.1 G/DL (ref 32.3–36.5)
MCV RBC AUTO: 98.6 FL (ref 81.4–97.8)
PLATELET # BLD AUTO: 217 K/UL (ref 164–446)
PMV BLD AUTO: 11.2 FL (ref 9–12.9)
RBC # BLD AUTO: 3.62 M/UL (ref 4.7–6.1)
WBC # BLD AUTO: 5.8 K/UL (ref 4.8–10.8)

## 2023-08-29 PROCEDURE — 36415 COLL VENOUS BLD VENIPUNCTURE: CPT

## 2023-08-29 PROCEDURE — 85027 COMPLETE CBC AUTOMATED: CPT

## 2023-08-30 ENCOUNTER — HOSPITAL ENCOUNTER (OUTPATIENT)
Dept: LAB | Facility: MEDICAL CENTER | Age: 83
End: 2023-08-30
Attending: INTERNAL MEDICINE
Payer: MEDICARE

## 2023-08-30 LAB
ALBUMIN SERPL BCP-MCNC: 4.1 G/DL (ref 3.2–4.9)
ALBUMIN/GLOB SERPL: 2.1 G/DL
ALP SERPL-CCNC: 139 U/L (ref 30–99)
ALT SERPL-CCNC: 9 U/L (ref 2–50)
ANION GAP SERPL CALC-SCNC: 9 MMOL/L (ref 7–16)
APPEARANCE UR: CLEAR
AST SERPL-CCNC: 17 U/L (ref 12–45)
BACTERIA #/AREA URNS HPF: NEGATIVE /HPF
BASOPHILS # BLD AUTO: 0.7 % (ref 0–1.8)
BASOPHILS # BLD: 0.03 K/UL (ref 0–0.12)
BILIRUB SERPL-MCNC: 0.4 MG/DL (ref 0.1–1.5)
BILIRUB UR QL STRIP.AUTO: NEGATIVE
BUN SERPL-MCNC: 25 MG/DL (ref 8–22)
CALCIUM ALBUM COR SERPL-MCNC: 8.7 MG/DL (ref 8.5–10.5)
CALCIUM SERPL-MCNC: 8.8 MG/DL (ref 8.5–10.5)
CHLORIDE SERPL-SCNC: 104 MMOL/L (ref 96–112)
CO2 SERPL-SCNC: 25 MMOL/L (ref 20–33)
COLOR UR: YELLOW
CREAT SERPL-MCNC: 1.34 MG/DL (ref 0.5–1.4)
EOSINOPHIL # BLD AUTO: 0.13 K/UL (ref 0–0.51)
EOSINOPHIL NFR BLD: 2.9 % (ref 0–6.9)
EPI CELLS #/AREA URNS HPF: NEGATIVE /HPF
ERYTHROCYTE [DISTWIDTH] IN BLOOD BY AUTOMATED COUNT: 51.8 FL (ref 35.9–50)
FERRITIN SERPL-MCNC: 61.2 NG/ML (ref 22–322)
GFR SERPLBLD CREATININE-BSD FMLA CKD-EPI: 53 ML/MIN/1.73 M 2
GLOBULIN SER CALC-MCNC: 2 G/DL (ref 1.9–3.5)
GLUCOSE SERPL-MCNC: 99 MG/DL (ref 65–99)
GLUCOSE UR STRIP.AUTO-MCNC: NEGATIVE MG/DL
HCT VFR BLD AUTO: 35.2 % (ref 42–52)
HGB BLD-MCNC: 11.1 G/DL (ref 14–18)
HYALINE CASTS #/AREA URNS LPF: ABNORMAL /LPF
IMM GRANULOCYTES # BLD AUTO: 0.01 K/UL (ref 0–0.11)
IMM GRANULOCYTES NFR BLD AUTO: 0.2 % (ref 0–0.9)
IRON SATN MFR SERPL: 19 % (ref 15–55)
IRON SERPL-MCNC: 51 UG/DL (ref 50–180)
KETONES UR STRIP.AUTO-MCNC: NEGATIVE MG/DL
LEUKOCYTE ESTERASE UR QL STRIP.AUTO: NEGATIVE
LYMPHOCYTES # BLD AUTO: 0.97 K/UL (ref 1–4.8)
LYMPHOCYTES NFR BLD: 21.3 % (ref 22–41)
MCH RBC QN AUTO: 30.6 PG (ref 27–33)
MCHC RBC AUTO-ENTMCNC: 31.5 G/DL (ref 32.3–36.5)
MCV RBC AUTO: 97 FL (ref 81.4–97.8)
MICRO URNS: ABNORMAL
MONOCYTES # BLD AUTO: 0.58 K/UL (ref 0–0.85)
MONOCYTES NFR BLD AUTO: 12.7 % (ref 0–13.4)
NEUTROPHILS # BLD AUTO: 2.84 K/UL (ref 1.82–7.42)
NEUTROPHILS NFR BLD: 62.2 % (ref 44–72)
NITRITE UR QL STRIP.AUTO: NEGATIVE
NRBC # BLD AUTO: 0 K/UL
NRBC BLD-RTO: 0 /100 WBC (ref 0–0.2)
PH UR STRIP.AUTO: 6.5 [PH] (ref 5–8)
PLATELET # BLD AUTO: 205 K/UL (ref 164–446)
PMV BLD AUTO: 11.2 FL (ref 9–12.9)
POTASSIUM SERPL-SCNC: 4.5 MMOL/L (ref 3.6–5.5)
PROT SERPL-MCNC: 6.1 G/DL (ref 6–8.2)
PROT UR QL STRIP: 30 MG/DL
RBC # BLD AUTO: 3.63 M/UL (ref 4.7–6.1)
RBC # URNS HPF: ABNORMAL /HPF
RBC UR QL AUTO: NEGATIVE
SODIUM SERPL-SCNC: 138 MMOL/L (ref 135–145)
SP GR UR STRIP.AUTO: 1.02
T4 FREE SERPL-MCNC: 1.15 NG/DL (ref 0.93–1.7)
TIBC SERPL-MCNC: 271 UG/DL (ref 250–450)
TSH SERPL DL<=0.005 MIU/L-ACNC: 1.49 UIU/ML (ref 0.38–5.33)
UIBC SERPL-MCNC: 220 UG/DL (ref 110–370)
UROBILINOGEN UR STRIP.AUTO-MCNC: 0.2 MG/DL
WBC # BLD AUTO: 4.6 K/UL (ref 4.8–10.8)
WBC #/AREA URNS HPF: ABNORMAL /HPF

## 2023-08-30 PROCEDURE — 82043 UR ALBUMIN QUANTITATIVE: CPT

## 2023-08-30 PROCEDURE — 84439 ASSAY OF FREE THYROXINE: CPT

## 2023-08-30 PROCEDURE — 85025 COMPLETE CBC W/AUTO DIFF WBC: CPT

## 2023-08-30 PROCEDURE — 83540 ASSAY OF IRON: CPT

## 2023-08-30 PROCEDURE — 84443 ASSAY THYROID STIM HORMONE: CPT

## 2023-08-30 PROCEDURE — 83550 IRON BINDING TEST: CPT

## 2023-08-30 PROCEDURE — 82728 ASSAY OF FERRITIN: CPT

## 2023-08-30 PROCEDURE — 80053 COMPREHEN METABOLIC PANEL: CPT

## 2023-08-30 PROCEDURE — 82570 ASSAY OF URINE CREATININE: CPT

## 2023-08-30 PROCEDURE — 81001 URINALYSIS AUTO W/SCOPE: CPT

## 2023-08-30 PROCEDURE — 36415 COLL VENOUS BLD VENIPUNCTURE: CPT

## 2023-08-31 LAB
CREAT UR-MCNC: 98.48 MG/DL
MICROALBUMIN UR-MCNC: 8.5 MG/DL
MICROALBUMIN/CREAT UR: 86 MG/G (ref 0–30)

## 2023-10-26 ENCOUNTER — TELEPHONE (OUTPATIENT)
Dept: CARDIOLOGY | Facility: MEDICAL CENTER | Age: 83
End: 2023-10-26
Payer: MEDICARE

## 2023-10-26 NOTE — TELEPHONE ENCOUNTER
Last OV: 8.24.2023  Proposed Surgery: Dental treatment   Surgery Date: TBD  Requesting Office Name: [facility]    Fax Number: [phone]  Preference of Location (default is surgery center unless specified by Cardiologist or [facility])  Prior Clearance Addressed: No      Anticoags/Antiplatelets: Apixaban   Outstanding Cardiac Imaging : No  Stent, Cardiac Devices, or Catheterization: No  Ablation, TAVR/Valve (including open heart), Cardioversion: No  Recent Cardiac Hospitalization: No            When: Greater than 6 months since hospitalization.   History (cardiac history):   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 ago    Cataract     bilat sugery- IOL    Dental disorder     Crowns & implants- Missing tooth left upper next to wisdom, lower right just one from center (all wisdom teeth are gone)    Hemorrhagic disorder (HCC)     eliuquis    High cholesterol     Hyperlipidemia     Hypertension     BPs run between 110/60 - 145/70's very controlled with meds    Pain     3-4/10 pain right now in Right Shoulder (increases to 7/10 with movement)    Psychiatric problem     taking Fluoxetine    Sleep apnea     cpap- w/O2 at 2L at night    Thyroid disease     Hashimotos    Urinary incontinence     Occasionally drips- wears pad             Surgical Clearance Letter Sent: YES   **Scan clearance request letter into [facility].**

## 2023-10-26 NOTE — LETTER
PROCEDURE/SURGERY CLEARANCE FORM    Date: 10/26/2023   Patient Name: Donny Haas    Dear Surgeon or Proceduralist,      Thank you for your request for cardiac stratification of our mutual patient Donny Haas 1940. We have reviewed their [facility] records; and to the best of our understanding this patient has not had stenting, ablation, cardiothoracic surgery or hospitalization for cardiovascular reasons in the past 6 months.  Donny Haas has been seen within the past 18 months and is considered to have non-modifiable cardiac risk for this low-risk procedure/surgery (Dental treatment). They may proceed from a cardiovascular standpoint and may hold their antiplatelet/anticoagulation as briefly as possible. Please have patient resume this medication when hemodynamically stable to do so.     Aspirin or Prasugrel   - hold 7 days prior to procedure/surgery, resume when hemodynamically stable      Clopidrogrel or Ticagrelor  - hold 7 days for all neurological procedures, hold 5 days prior to all other procedure/surgery,  resume when hemodynamically stable     Warfarin - hold 7 days for all neurological procedures, hold 5 days prior to all other procedure/surgery and coordinate with [facility]ticoagulation [facility] ([phone]) INR testing and dose management.      Pradaxa/Xarelto/Eliquis/Savesya - hold 1 day prior to procedure for low bleeding risk procedure, 2 days for high bleeding risk procedure, or consider holding 3 days or longer for patients with reduced kidney function (CrCl <30mL/min) or spinal/cranial surgeries/procedures.      If they have a mechanical heart valve, please coordinate with [facility] Anticoagulation Service ([phone]) the proper management of their anticoagulant in the periprocedural or perioperative period.      Some patients have higher risk for cardiovascular complications or holding medication. If our patient has had prior complications of  holding antiplatelet or anticoagulants in the past and we have seen them after these events, we have addressed these concerns with the patient. They are at an unknown degree of increased risk for recurrent complication.  You may hold anticoagulation/antiplatelets for the procedure or surgery if the benefits of the procedure or surgery outweigh this nonmodifiable risk.      If Donny Haas 1940 has new symptoms of heart failure decompensation, unstable arrythmia, or angina please reach out and we will assess the patient.      If you have other patient-specific concerns, please feel free to reach out to the patient's cardiologist directly at [phone].     Thank you,       [facility]

## 2023-11-20 NOTE — ASSESSMENT & PLAN NOTE
?PNA on CTA chest   Check procalcitonin   Empiric Iv Rocephin and Azithromycin      S/p ablation in 8/2021, no known episodes of a fib since that time  Continue anticoagulation and rate control with cardizem

## 2023-11-29 ENCOUNTER — HOSPITAL ENCOUNTER (OUTPATIENT)
Dept: LAB | Facility: MEDICAL CENTER | Age: 83
End: 2023-11-29
Attending: UROLOGY
Payer: MEDICARE

## 2023-11-29 ENCOUNTER — PATIENT MESSAGE (OUTPATIENT)
Dept: HEALTH INFORMATION MANAGEMENT | Facility: OTHER | Age: 83
End: 2023-11-29

## 2023-11-29 LAB — PSA SERPL-MCNC: 2.54 NG/ML (ref 0–4)

## 2023-11-29 PROCEDURE — 36415 COLL VENOUS BLD VENIPUNCTURE: CPT

## 2023-11-29 PROCEDURE — 84153 ASSAY OF PSA TOTAL: CPT

## 2024-01-25 ENCOUNTER — HOSPITAL ENCOUNTER (OUTPATIENT)
Dept: LAB | Facility: MEDICAL CENTER | Age: 84
End: 2024-01-25
Attending: INTERNAL MEDICINE
Payer: MEDICARE

## 2024-01-25 LAB
BASOPHILS # BLD AUTO: 0.5 % (ref 0–1.8)
BASOPHILS # BLD: 0.03 K/UL (ref 0–0.12)
EOSINOPHIL # BLD AUTO: 0.16 K/UL (ref 0–0.51)
EOSINOPHIL NFR BLD: 2.8 % (ref 0–6.9)
ERYTHROCYTE [DISTWIDTH] IN BLOOD BY AUTOMATED COUNT: 50.7 FL (ref 35.9–50)
FERRITIN SERPL-MCNC: 38.7 NG/ML (ref 22–322)
HCT VFR BLD AUTO: 36.5 % (ref 42–52)
HCYS SERPL-SCNC: 15.12 UMOL/L
HGB BLD-MCNC: 11.9 G/DL (ref 14–18)
IMM GRANULOCYTES # BLD AUTO: 0.02 K/UL (ref 0–0.11)
IMM GRANULOCYTES NFR BLD AUTO: 0.4 % (ref 0–0.9)
IRON SATN MFR SERPL: 16 % (ref 15–55)
IRON SERPL-MCNC: 48 UG/DL (ref 50–180)
LYMPHOCYTES # BLD AUTO: 1.24 K/UL (ref 1–4.8)
LYMPHOCYTES NFR BLD: 22 % (ref 22–41)
MCH RBC QN AUTO: 31.1 PG (ref 27–33)
MCHC RBC AUTO-ENTMCNC: 32.6 G/DL (ref 32.3–36.5)
MCV RBC AUTO: 95.3 FL (ref 81.4–97.8)
MONOCYTES # BLD AUTO: 0.57 K/UL (ref 0–0.85)
MONOCYTES NFR BLD AUTO: 10.1 % (ref 0–13.4)
NEUTROPHILS # BLD AUTO: 3.61 K/UL (ref 1.82–7.42)
NEUTROPHILS NFR BLD: 64.2 % (ref 44–72)
NRBC # BLD AUTO: 0 K/UL
NRBC BLD-RTO: 0 /100 WBC (ref 0–0.2)
PLATELET # BLD AUTO: 234 K/UL (ref 164–446)
PMV BLD AUTO: 10.4 FL (ref 9–12.9)
RBC # BLD AUTO: 3.83 M/UL (ref 4.7–6.1)
TIBC SERPL-MCNC: 296 UG/DL (ref 250–450)
UIBC SERPL-MCNC: 248 UG/DL (ref 110–370)
WBC # BLD AUTO: 5.6 K/UL (ref 4.8–10.8)

## 2024-01-25 PROCEDURE — 85025 COMPLETE CBC W/AUTO DIFF WBC: CPT

## 2024-01-25 PROCEDURE — 82728 ASSAY OF FERRITIN: CPT

## 2024-01-25 PROCEDURE — 83090 ASSAY OF HOMOCYSTEINE: CPT | Mod: GA

## 2024-01-25 PROCEDURE — 83550 IRON BINDING TEST: CPT

## 2024-01-25 PROCEDURE — 83540 ASSAY OF IRON: CPT

## 2024-01-25 PROCEDURE — 83921 ORGANIC ACID SINGLE QUANT: CPT

## 2024-01-25 PROCEDURE — 36415 COLL VENOUS BLD VENIPUNCTURE: CPT

## 2024-01-29 LAB — METHYLMALONATE SERPL-SCNC: 0.23 UMOL/L (ref 0–0.4)

## 2024-06-07 ENCOUNTER — HOSPITAL ENCOUNTER (OUTPATIENT)
Dept: LAB | Facility: MEDICAL CENTER | Age: 84
End: 2024-06-07
Attending: INTERNAL MEDICINE
Payer: MEDICARE

## 2024-06-07 LAB
ALBUMIN SERPL BCP-MCNC: 4 G/DL (ref 3.2–4.9)
ALBUMIN/GLOB SERPL: 1.6 G/DL
ALP SERPL-CCNC: 120 U/L (ref 30–99)
ALT SERPL-CCNC: 14 U/L (ref 2–50)
ANION GAP SERPL CALC-SCNC: 13 MMOL/L (ref 7–16)
APPEARANCE UR: CLEAR
AST SERPL-CCNC: 16 U/L (ref 12–45)
BACTERIA #/AREA URNS HPF: NEGATIVE /HPF
BASOPHILS # BLD AUTO: 0.6 % (ref 0–1.8)
BASOPHILS # BLD: 0.03 K/UL (ref 0–0.12)
BILIRUB SERPL-MCNC: 0.3 MG/DL (ref 0.1–1.5)
BILIRUB UR QL STRIP.AUTO: NEGATIVE
BUN SERPL-MCNC: 24 MG/DL (ref 8–22)
CALCIUM ALBUM COR SERPL-MCNC: 8.8 MG/DL (ref 8.5–10.5)
CALCIUM SERPL-MCNC: 8.8 MG/DL (ref 8.5–10.5)
CHLORIDE SERPL-SCNC: 100 MMOL/L (ref 96–112)
CO2 SERPL-SCNC: 23 MMOL/L (ref 20–33)
COLOR UR: YELLOW
CREAT SERPL-MCNC: 1.58 MG/DL (ref 0.5–1.4)
EOSINOPHIL # BLD AUTO: 0.28 K/UL (ref 0–0.51)
EOSINOPHIL NFR BLD: 5.7 % (ref 0–6.9)
EPI CELLS #/AREA URNS HPF: NEGATIVE /HPF
ERYTHROCYTE [DISTWIDTH] IN BLOOD BY AUTOMATED COUNT: 48.3 FL (ref 35.9–50)
FERRITIN SERPL-MCNC: 77.8 NG/ML (ref 22–322)
GFR SERPLBLD CREATININE-BSD FMLA CKD-EPI: 43 ML/MIN/1.73 M 2
GLOBULIN SER CALC-MCNC: 2.5 G/DL (ref 1.9–3.5)
GLUCOSE SERPL-MCNC: 87 MG/DL (ref 65–99)
GLUCOSE UR STRIP.AUTO-MCNC: NEGATIVE MG/DL
HCT VFR BLD AUTO: 35.5 % (ref 42–52)
HCYS SERPL-SCNC: 11.87 UMOL/L
HGB BLD-MCNC: 11.8 G/DL (ref 14–18)
HYALINE CASTS #/AREA URNS LPF: ABNORMAL /LPF
IMM GRANULOCYTES # BLD AUTO: 0.01 K/UL (ref 0–0.11)
IMM GRANULOCYTES NFR BLD AUTO: 0.2 % (ref 0–0.9)
IRON SATN MFR SERPL: 29 % (ref 15–55)
IRON SERPL-MCNC: 71 UG/DL (ref 50–180)
KETONES UR STRIP.AUTO-MCNC: NEGATIVE MG/DL
LEUKOCYTE ESTERASE UR QL STRIP.AUTO: NEGATIVE
LYMPHOCYTES # BLD AUTO: 1.22 K/UL (ref 1–4.8)
LYMPHOCYTES NFR BLD: 24.7 % (ref 22–41)
MCH RBC QN AUTO: 31.1 PG (ref 27–33)
MCHC RBC AUTO-ENTMCNC: 33.2 G/DL (ref 32.3–36.5)
MCV RBC AUTO: 93.4 FL (ref 81.4–97.8)
MICRO URNS: ABNORMAL
MONOCYTES # BLD AUTO: 0.49 K/UL (ref 0–0.85)
MONOCYTES NFR BLD AUTO: 9.9 % (ref 0–13.4)
NEUTROPHILS # BLD AUTO: 2.9 K/UL (ref 1.82–7.42)
NEUTROPHILS NFR BLD: 58.9 % (ref 44–72)
NITRITE UR QL STRIP.AUTO: NEGATIVE
NRBC # BLD AUTO: 0 K/UL
NRBC BLD-RTO: 0 /100 WBC (ref 0–0.2)
PH UR STRIP.AUTO: 6.5 [PH] (ref 5–8)
PLATELET # BLD AUTO: 201 K/UL (ref 164–446)
PMV BLD AUTO: 11.1 FL (ref 9–12.9)
POTASSIUM SERPL-SCNC: 4.7 MMOL/L (ref 3.6–5.5)
PROT SERPL-MCNC: 6.5 G/DL (ref 6–8.2)
PROT UR QL STRIP: 30 MG/DL
RBC # BLD AUTO: 3.8 M/UL (ref 4.7–6.1)
RBC # URNS HPF: ABNORMAL /HPF
RBC UR QL AUTO: NEGATIVE
SODIUM SERPL-SCNC: 136 MMOL/L (ref 135–145)
SP GR UR STRIP.AUTO: 1.01
T4 FREE SERPL-MCNC: 1.34 NG/DL (ref 0.93–1.7)
TIBC SERPL-MCNC: 248 UG/DL (ref 250–450)
TSH SERPL DL<=0.005 MIU/L-ACNC: 1.55 UIU/ML (ref 0.38–5.33)
UIBC SERPL-MCNC: 177 UG/DL (ref 110–370)
UROBILINOGEN UR STRIP.AUTO-MCNC: 0.2 MG/DL
WBC # BLD AUTO: 4.9 K/UL (ref 4.8–10.8)
WBC #/AREA URNS HPF: ABNORMAL /HPF

## 2024-06-07 PROCEDURE — 83921 ORGANIC ACID SINGLE QUANT: CPT

## 2024-06-07 PROCEDURE — 83090 ASSAY OF HOMOCYSTEINE: CPT

## 2024-06-07 PROCEDURE — 84439 ASSAY OF FREE THYROXINE: CPT

## 2024-06-07 PROCEDURE — 82728 ASSAY OF FERRITIN: CPT

## 2024-06-07 PROCEDURE — 36415 COLL VENOUS BLD VENIPUNCTURE: CPT

## 2024-06-07 PROCEDURE — 81001 URINALYSIS AUTO W/SCOPE: CPT

## 2024-06-07 PROCEDURE — 84443 ASSAY THYROID STIM HORMONE: CPT

## 2024-06-07 PROCEDURE — 85025 COMPLETE CBC W/AUTO DIFF WBC: CPT

## 2024-06-07 PROCEDURE — 82570 ASSAY OF URINE CREATININE: CPT

## 2024-06-07 PROCEDURE — 80053 COMPREHEN METABOLIC PANEL: CPT

## 2024-06-07 PROCEDURE — 83550 IRON BINDING TEST: CPT

## 2024-06-07 PROCEDURE — 82043 UR ALBUMIN QUANTITATIVE: CPT

## 2024-06-07 PROCEDURE — 83540 ASSAY OF IRON: CPT

## 2024-06-08 LAB
CREAT UR-MCNC: 53.28 MG/DL
MICROALBUMIN UR-MCNC: 10.2 MG/DL
MICROALBUMIN/CREAT UR: 191 MG/G (ref 0–30)

## 2024-06-12 LAB — METHYLMALONATE SERPL-SCNC: 0.2 UMOL/L (ref 0–0.4)

## 2024-09-12 DIAGNOSIS — I48.0 PAROXYSMAL ATRIAL FIBRILLATION (HCC): ICD-10-CM

## 2024-09-13 ENCOUNTER — TELEPHONE (OUTPATIENT)
Dept: CARDIOLOGY | Facility: MEDICAL CENTER | Age: 84
End: 2024-09-13
Payer: MEDICARE

## 2024-09-13 ENCOUNTER — HOSPITAL ENCOUNTER (OUTPATIENT)
Dept: LAB | Facility: MEDICAL CENTER | Age: 84
End: 2024-09-13
Attending: INTERNAL MEDICINE
Payer: MEDICARE

## 2024-09-13 LAB
25(OH)D3 SERPL-MCNC: 54 NG/ML (ref 30–100)
ALBUMIN SERPL BCP-MCNC: 4.1 G/DL (ref 3.2–4.9)
ALBUMIN/GLOB SERPL: 1.4 G/DL
ALP SERPL-CCNC: 112 U/L (ref 30–99)
ALT SERPL-CCNC: 11 U/L (ref 2–50)
ANION GAP SERPL CALC-SCNC: 12 MMOL/L (ref 7–16)
APPEARANCE UR: CLEAR
AST SERPL-CCNC: 17 U/L (ref 12–45)
BACTERIA #/AREA URNS HPF: NEGATIVE /HPF
BASOPHILS # BLD AUTO: 0.9 % (ref 0–1.8)
BASOPHILS # BLD: 0.05 K/UL (ref 0–0.12)
BILIRUB SERPL-MCNC: 0.4 MG/DL (ref 0.1–1.5)
BILIRUB UR QL STRIP.AUTO: NEGATIVE
BUN SERPL-MCNC: 33 MG/DL (ref 8–22)
CALCIUM ALBUM COR SERPL-MCNC: 8.9 MG/DL (ref 8.5–10.5)
CALCIUM SERPL-MCNC: 9 MG/DL (ref 8.5–10.5)
CHLORIDE SERPL-SCNC: 103 MMOL/L (ref 96–112)
CHOLEST SERPL-MCNC: 170 MG/DL (ref 100–199)
CO2 SERPL-SCNC: 22 MMOL/L (ref 20–33)
COLOR UR: YELLOW
CREAT SERPL-MCNC: 1.45 MG/DL (ref 0.5–1.4)
EOSINOPHIL # BLD AUTO: 0.4 K/UL (ref 0–0.51)
EOSINOPHIL NFR BLD: 7.6 % (ref 0–6.9)
EPI CELLS #/AREA URNS HPF: NEGATIVE /HPF
ERYTHROCYTE [DISTWIDTH] IN BLOOD BY AUTOMATED COUNT: 51.9 FL (ref 35.9–50)
FERRITIN SERPL-MCNC: 60.1 NG/ML (ref 22–322)
GFR SERPLBLD CREATININE-BSD FMLA CKD-EPI: 48 ML/MIN/1.73 M 2
GLOBULIN SER CALC-MCNC: 2.9 G/DL (ref 1.9–3.5)
GLUCOSE SERPL-MCNC: 97 MG/DL (ref 65–99)
GLUCOSE UR STRIP.AUTO-MCNC: NEGATIVE MG/DL
HCT VFR BLD AUTO: 37 % (ref 42–52)
HCYS SERPL-SCNC: 17.6 UMOL/L
HDLC SERPL-MCNC: 78 MG/DL
HGB BLD-MCNC: 12 G/DL (ref 14–18)
HYALINE CASTS #/AREA URNS LPF: ABNORMAL /LPF
IMM GRANULOCYTES # BLD AUTO: 0.01 K/UL (ref 0–0.11)
IMM GRANULOCYTES NFR BLD AUTO: 0.2 % (ref 0–0.9)
IRON SATN MFR SERPL: 23 % (ref 15–55)
IRON SERPL-MCNC: 58 UG/DL (ref 50–180)
KETONES UR STRIP.AUTO-MCNC: NEGATIVE MG/DL
LDLC SERPL CALC-MCNC: 79 MG/DL
LEUKOCYTE ESTERASE UR QL STRIP.AUTO: NEGATIVE
LYMPHOCYTES # BLD AUTO: 1.37 K/UL (ref 1–4.8)
LYMPHOCYTES NFR BLD: 25.9 % (ref 22–41)
MCH RBC QN AUTO: 31 PG (ref 27–33)
MCHC RBC AUTO-ENTMCNC: 32.4 G/DL (ref 32.3–36.5)
MCV RBC AUTO: 95.6 FL (ref 81.4–97.8)
MICRO URNS: ABNORMAL
MONOCYTES # BLD AUTO: 0.55 K/UL (ref 0–0.85)
MONOCYTES NFR BLD AUTO: 10.4 % (ref 0–13.4)
NEUTROPHILS # BLD AUTO: 2.9 K/UL (ref 1.82–7.42)
NEUTROPHILS NFR BLD: 55 % (ref 44–72)
NITRITE UR QL STRIP.AUTO: NEGATIVE
NRBC # BLD AUTO: 0 K/UL
NRBC BLD-RTO: 0 /100 WBC (ref 0–0.2)
PH UR STRIP.AUTO: 6.5 [PH] (ref 5–8)
PLATELET # BLD AUTO: 229 K/UL (ref 164–446)
PMV BLD AUTO: 10.3 FL (ref 9–12.9)
POTASSIUM SERPL-SCNC: 4.9 MMOL/L (ref 3.6–5.5)
PROT SERPL-MCNC: 7 G/DL (ref 6–8.2)
PROT UR QL STRIP: 30 MG/DL
PSA SERPL-MCNC: 2.78 NG/ML (ref 0–4)
RBC # BLD AUTO: 3.87 M/UL (ref 4.7–6.1)
RBC # URNS HPF: ABNORMAL /HPF
RBC UR QL AUTO: ABNORMAL
SODIUM SERPL-SCNC: 137 MMOL/L (ref 135–145)
SP GR UR STRIP.AUTO: 1.01
T4 FREE SERPL-MCNC: 1.05 NG/DL (ref 0.93–1.7)
TIBC SERPL-MCNC: 257 UG/DL (ref 250–450)
TRIGL SERPL-MCNC: 63 MG/DL (ref 0–149)
TSH SERPL-ACNC: 4.6 UIU/ML (ref 0.35–5.5)
UIBC SERPL-MCNC: 199 UG/DL (ref 110–370)
UROBILINOGEN UR STRIP.AUTO-MCNC: 0.2 MG/DL
WBC # BLD AUTO: 5.3 K/UL (ref 4.8–10.8)
WBC #/AREA URNS HPF: ABNORMAL /HPF

## 2024-09-13 PROCEDURE — 82043 UR ALBUMIN QUANTITATIVE: CPT

## 2024-09-13 PROCEDURE — 80053 COMPREHEN METABOLIC PANEL: CPT

## 2024-09-13 PROCEDURE — 85025 COMPLETE CBC W/AUTO DIFF WBC: CPT

## 2024-09-13 PROCEDURE — 82570 ASSAY OF URINE CREATININE: CPT

## 2024-09-13 PROCEDURE — 82306 VITAMIN D 25 HYDROXY: CPT

## 2024-09-13 PROCEDURE — 83090 ASSAY OF HOMOCYSTEINE: CPT

## 2024-09-13 PROCEDURE — 83921 ORGANIC ACID SINGLE QUANT: CPT

## 2024-09-13 PROCEDURE — 83540 ASSAY OF IRON: CPT

## 2024-09-13 PROCEDURE — 80061 LIPID PANEL: CPT

## 2024-09-13 PROCEDURE — 81001 URINALYSIS AUTO W/SCOPE: CPT

## 2024-09-13 PROCEDURE — 84153 ASSAY OF PSA TOTAL: CPT | Mod: GA

## 2024-09-13 PROCEDURE — 82728 ASSAY OF FERRITIN: CPT

## 2024-09-13 PROCEDURE — 84443 ASSAY THYROID STIM HORMONE: CPT

## 2024-09-13 PROCEDURE — 36415 COLL VENOUS BLD VENIPUNCTURE: CPT

## 2024-09-13 PROCEDURE — 83550 IRON BINDING TEST: CPT

## 2024-09-13 PROCEDURE — 84439 ASSAY OF FREE THYROXINE: CPT

## 2024-09-13 NOTE — TELEPHONE ENCOUNTER
Called pt to [facility] appt - pt WCB. Sent [place] Boott w/ p# as requested. /cg 09/13/24

## 2024-09-14 LAB
CREAT UR-MCNC: 45.95 MG/DL
MICROALBUMIN UR-MCNC: 11.3 MG/DL
MICROALBUMIN/CREAT UR: 246 MG/G (ref 0–30)

## 2024-09-19 LAB — METHYLMALONATE SERPL-SCNC: 0.22 UMOL/L (ref 0–0.4)

## 2024-10-10 ENCOUNTER — PATIENT MESSAGE (OUTPATIENT)
Dept: CARDIOLOGY | Facility: MEDICAL CENTER | Age: 84
End: 2024-10-10

## 2024-10-10 ENCOUNTER — OFFICE VISIT (OUTPATIENT)
Dept: CARDIOLOGY | Facility: MEDICAL CENTER | Age: 84
End: 2024-10-10
Attending: NURSE PRACTITIONER
Payer: MEDICARE

## 2024-10-10 VITALS
HEART RATE: 70 BPM | WEIGHT: 218 LBS | HEIGHT: 69 IN | DIASTOLIC BLOOD PRESSURE: 50 MMHG | BODY MASS INDEX: 32.29 KG/M2 | SYSTOLIC BLOOD PRESSURE: 102 MMHG | OXYGEN SATURATION: 95 %

## 2024-10-10 DIAGNOSIS — N18.1 CHRONIC RENAL IMPAIRMENT, STAGE 1: ICD-10-CM

## 2024-10-10 DIAGNOSIS — E66.9 OBESITY (BMI 30-39.9): ICD-10-CM

## 2024-10-10 DIAGNOSIS — R60.0 LOCALIZED EDEMA: ICD-10-CM

## 2024-10-10 DIAGNOSIS — G47.30 SLEEP APNEA, UNSPECIFIED TYPE: ICD-10-CM

## 2024-10-10 DIAGNOSIS — E78.5 HYPERLIPIDEMIA WITH TARGET LDL LESS THAN 100: ICD-10-CM

## 2024-10-10 DIAGNOSIS — I10 ESSENTIAL HYPERTENSION: ICD-10-CM

## 2024-10-10 DIAGNOSIS — I48.0 PAROXYSMAL ATRIAL FIBRILLATION (HCC): ICD-10-CM

## 2024-10-10 PROCEDURE — 99213 OFFICE O/P EST LOW 20 MIN: CPT | Performed by: NURSE PRACTITIONER

## 2024-10-10 PROCEDURE — 99214 OFFICE O/P EST MOD 30 MIN: CPT | Performed by: NURSE PRACTITIONER

## 2024-10-10 PROCEDURE — 3074F SYST BP LT 130 MM HG: CPT | Performed by: NURSE PRACTITIONER

## 2024-10-10 PROCEDURE — 3078F DIAST BP <80 MM HG: CPT | Performed by: NURSE PRACTITIONER

## 2024-10-10 RX ORDER — ATORVASTATIN CALCIUM 20 MG/1
20 TABLET, FILM COATED ORAL EVERY EVENING
Qty: 100 TABLET | Refills: 3 | Status: SHIPPED | OUTPATIENT
Start: 2024-10-10

## 2024-10-10 RX ORDER — DILTIAZEM HYDROCHLORIDE 240 MG/1
240 CAPSULE, COATED, EXTENDED RELEASE ORAL EVERY EVENING
Qty: 100 CAPSULE | Refills: 3 | Status: SHIPPED | OUTPATIENT
Start: 2024-10-10

## 2024-10-10 RX ORDER — LOSARTAN POTASSIUM 50 MG/1
50 TABLET ORAL DAILY
Qty: 100 TABLET | Refills: 3 | Status: SHIPPED | OUTPATIENT
Start: 2024-10-10

## 2024-10-10 ASSESSMENT — FIBROSIS 4 INDEX: FIB4 SCORE: 1.86

## 2024-10-10 ASSESSMENT — ENCOUNTER SYMPTOMS
MYALGIAS: 0
FEVER: 0
PND: 0
DIZZINESS: 0
SHORTNESS OF BREATH: 0
CLAUDICATION: 0
ORTHOPNEA: 0
ABDOMINAL PAIN: 0
PALPITATIONS: 1
COUGH: 0

## 2024-10-18 ENCOUNTER — NON-PROVIDER VISIT (OUTPATIENT)
Dept: CARDIOLOGY | Facility: MEDICAL CENTER | Age: 84
End: 2024-10-18
Attending: NURSE PRACTITIONER
Payer: MEDICARE

## 2024-10-18 DIAGNOSIS — I48.0 PAROXYSMAL ATRIAL FIBRILLATION (HCC): ICD-10-CM

## 2024-11-01 ENCOUNTER — TELEPHONE (OUTPATIENT)
Dept: CARDIOLOGY | Facility: MEDICAL CENTER | Age: 84
End: 2024-11-01
Payer: MEDICARE

## 2024-11-01 NOTE — TELEPHONE ENCOUNTER
EOS noted in media dated today.      To YONIS lassiter AK: Please see EOS and read for final results. Thank you!       To SC, please advise ~thank you

## 2024-11-01 NOTE — TELEPHONE ENCOUNTER
SHOSHANA EOS to SC's nurse, Lena, on 11/1/2024    Preliminary findings:    3 episodes of SVT  with an avg rate of 95 bpm  Some episodes of SVT may be possible Atrial Tachycardia with variable block    Sinus rhythm 44-84 with an avg rate of 68 bpm    1.7% isolated SVE(s) rare couplets and triplets    Rare ventricular ectopy, no triplets    No patient events

## 2024-11-07 NOTE — PATIENT COMMUNICATION
1258 Pt to clinic for CIV d/c.  5FU pump empty. Port aspirated, positive blood return noted. Port flushed with 10mL NS and Heparin 500units, then de-accessed.  Pt discharged without issue.    To SC: LEYLA, patient responded regarding blood pressure and test results. Thank you!

## 2024-11-08 ENCOUNTER — HOSPITAL ENCOUNTER (OUTPATIENT)
Dept: LAB | Facility: MEDICAL CENTER | Age: 84
End: 2024-11-08
Attending: NURSE PRACTITIONER
Payer: MEDICARE

## 2024-11-08 DIAGNOSIS — R60.0 LOCALIZED EDEMA: ICD-10-CM

## 2024-11-08 LAB
ANION GAP SERPL CALC-SCNC: 10 MMOL/L (ref 7–16)
BUN SERPL-MCNC: 20 MG/DL (ref 8–22)
CALCIUM SERPL-MCNC: 8.8 MG/DL (ref 8.5–10.5)
CHLORIDE SERPL-SCNC: 102 MMOL/L (ref 96–112)
CO2 SERPL-SCNC: 23 MMOL/L (ref 20–33)
CREAT SERPL-MCNC: 1.45 MG/DL (ref 0.5–1.4)
GFR SERPLBLD CREATININE-BSD FMLA CKD-EPI: 48 ML/MIN/1.73 M 2
GLUCOSE SERPL-MCNC: 96 MG/DL (ref 65–99)
NT-PROBNP SERPL IA-MCNC: 256 PG/ML (ref 0–125)
POTASSIUM SERPL-SCNC: 4.7 MMOL/L (ref 3.6–5.5)
SODIUM SERPL-SCNC: 135 MMOL/L (ref 135–145)

## 2024-11-08 PROCEDURE — 36415 COLL VENOUS BLD VENIPUNCTURE: CPT

## 2024-11-08 PROCEDURE — 83880 ASSAY OF NATRIURETIC PEPTIDE: CPT | Mod: GA

## 2024-11-08 PROCEDURE — 80048 BASIC METABOLIC PNL TOTAL CA: CPT

## 2024-11-12 ENCOUNTER — TELEPHONE (OUTPATIENT)
Dept: CARDIOLOGY | Facility: MEDICAL CENTER | Age: 84
End: 2024-11-12
Payer: MEDICARE

## 2024-11-13 NOTE — TELEPHONE ENCOUNTER
Phone Number Called: [phone]    Call outcome: Spoke to patient regarding message below.    Message: Called to inform patient of SC's recommendations. Pt reports he is feeling good.     Pt wanted to let SC know that he has wet macular degeneration and has been getting an injection monthly. He also advised that he gets an injection every 4 months for his psoriasis. Advised to find out what medications he is receiving so we can add to his chart. Pt will call back with medication names.

## 2024-11-13 NOTE — TELEPHONE ENCOUNTER
----- Message from Nurse Practitioner JAMAICA SeverinoPRosaliaRALLIE sent at 11/12/2024  9:53 AM PST -----  Labs not concerning, follow up as planned. Only mild elevation in BNP. Not concerning unless having symptoms. SC

## 2025-02-06 ENCOUNTER — OFFICE VISIT (OUTPATIENT)
Dept: URGENT CARE | Facility: CLINIC | Age: 85
End: 2025-02-06
Payer: MEDICARE

## 2025-02-06 VITALS
HEIGHT: 69 IN | RESPIRATION RATE: 18 BRPM | SYSTOLIC BLOOD PRESSURE: 124 MMHG | OXYGEN SATURATION: 98 % | BODY MASS INDEX: 32.29 KG/M2 | TEMPERATURE: 97.7 F | HEART RATE: 68 BPM | DIASTOLIC BLOOD PRESSURE: 86 MMHG | WEIGHT: 218 LBS

## 2025-02-06 DIAGNOSIS — T14.8XXA WOUND OF SKIN: ICD-10-CM

## 2025-02-06 PROCEDURE — 12001 RPR S/N/AX/GEN/TRNK 2.5CM/<: CPT | Mod: FA | Performed by: FAMILY MEDICINE

## 2025-02-06 PROCEDURE — 3074F SYST BP LT 130 MM HG: CPT | Performed by: FAMILY MEDICINE

## 2025-02-06 PROCEDURE — 3079F DIAST BP 80-89 MM HG: CPT | Performed by: FAMILY MEDICINE

## 2025-02-06 RX ORDER — AZITHROMYCIN 250 MG/1
TABLET, FILM COATED ORAL
COMMUNITY
End: 2025-02-06

## 2025-02-06 RX ORDER — LEVOTHYROXINE SODIUM 100 UG/1
112 TABLET ORAL DAILY
COMMUNITY

## 2025-02-06 RX ORDER — DILTIAZEM HYDROCHLORIDE EXTENDED-RELEASE TABLETS 180 MG/1
180 TABLET, EXTENDED RELEASE ORAL DAILY
COMMUNITY

## 2025-02-06 ASSESSMENT — FIBROSIS 4 INDEX: FIB4 SCORE: 1.86

## 2025-02-06 NOTE — PROGRESS NOTES
Subjective     Edward Haas is a 84 y.o. male who presents with Laceration (Left thumb laceration about 4 hours ago )    This is a  new problem with uncertain prognosis:   This is a very pleasant 84 y.o. who has come to the walk-in clinic today for cut to left thumb 4 hours ago.  Was slicing an apple and knife slipped and cut left thumb.  Is on blood thinners due to atrial fibrillation and says bleeding is not stopping.  Last tetanus booster in 2019.          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 ago    Cataract     bilat sugery- IOL    Dental disorder     Crowns & implants- Missing tooth left upper next to wisdom, lower right just one from center (all wisdom teeth are gone)    Hemorrhagic disorder (HCC)     eliuquis    High cholesterol     Hyperlipidemia     Hypertension     BPs run between 110/60 - 145/70's very controlled with meds    Pain     3-4/10 pain right now in Right Shoulder (increases to 7/10 with movement)    Psychiatric problem     taking Fluoxetine    Sleep apnea     cpap- w/O2 at 2L at night    Thyroid disease     Hashimotos    Urinary incontinence     Occasionally drips- wears pad        PSH:  Past Surgical History:   Procedure Laterality Date    PB RECONSTR TOTAL SHOULDER IMPLANT Right 6/28/2023    Procedure: REVISION RIGHT REVERSE TOTAL SHOULDER ARTHROPLASTY;  Surgeon: Shawn Ward M.D.;  Location: SURGERY [facility];  Service: Orthopedics    PB RECONSTR TOTAL SHOULDER IMPLANT Right 06/09/2023    Procedure: RIGHT REVERSE TOTAL SHOULDER ARTHROPLASTY;  Surgeon: Shawn Ward M.D.;  Location: SURGERY [facility];  Service: Orthopedics    LASER ABLATION  2022    Cardiac ablation    BLEPHAROPLASTY Bilateral     CATARACT  EXTRACTION WITH IOL      DENTAL SURGERY      HERNIA REPAIR Right     Inguinal Hernia surgery at 18 yrs old    OTHER      Mohs procedure for CA Skin    OTHER CARDIAC SURGERY  Ablation done August 13, 2021    OTHER ORTHOPEDIC SURGERY      thumb repair    TONSILLECTOMY         MEDS:    Current Outpatient Medications:     dilTIAZem HCl  MG TABLET SR 24 HR, Take 180 mg by mouth every day., Disp: , Rfl:     levothyroxine (SYNTHROID) 100 MCG Tab, Take 112 mcg by mouth every day., Disp: , Rfl:     dilTIAZem CD (CARDIZEM CD) 240 MG CAPSULE SR 24 HR, Take 1 Capsule by mouth every evening., Disp: 100 Capsule, Rfl: 3    losartan (COZAAR) 50 MG Tab, Take 1 Tablet by mouth every day., Disp: 100 Tablet, Rfl: 3    atorvastatin (LIPITOR) 20 MG Tab, Take 1 Tablet by mouth every evening., Disp: 100 Tablet, Rfl: 3    apixaban (ELIQUIS) 5mg Tab, Take 1 Tablet by mouth 2 times a day., Disp: 200 Tablet, Rfl: 3    Folic Acid (FOLATE PO), Take 1 Tablet by mouth see administration instructions. Pt takes on Sunday, Mon, Wed, Fri, and Sat, Disp: , Rfl:     Cholecalciferol ([employer] VITAMIN D3) 41541 UNIT Cap, Take 10,000 Units by mouth see administration instructions. Pt takes on Monday, Wed, and Friday, Disp: , Rfl:     terazosin (HYTRIN) 5 MG Cap, Take 5 mg by mouth every evening., Disp: , Rfl:     Cyanocobalamin (VITAMIN B-12 PO), Take 1 Tablet by mouth every day., Disp: , Rfl:     levothyroxine (SYNTHROID) 112 MCG Tab, Take 137 mcg by mouth every day., Disp: , Rfl:     FLUoxetine (PROZAC) 20 MG Cap, Take 20 mg by mouth every morning., Disp: , Rfl:     finasteride (PROSCAR) 5 MG Tab, Take 5 mg by mouth every evening., Disp: , Rfl:     Multiple Vitamins-Minerals (ICAPS AREDS 2 PO), Take 1 Capsule by mouth 2 times a day., Disp: , Rfl:     ** I have documented what I find to be significant in regards to past medical, social, family and surgical history  in my HPI or under PMH/PSH/FH review section, otherwise it is noncontributory **        "    HPI    Review of Systems   Skin:         Wound of left thumb   All other systems reviewed and are negative.             Objective     /86   Pulse 68   Temp 36.5 °C (97.7 °F) (Temporal)   Resp 18   Ht 1.753 m (5' 9\")   Wt 98.9 kg (218 lb)   SpO2 98%   BMI 32.19 kg/m²      Physical Exam  Vitals and nursing note reviewed.   Constitutional:       General: He is not in acute distress.     Appearance: Normal appearance. He is well-developed. He is not ill-appearing, toxic-appearing or diaphoretic.   HENT:      Head: Normocephalic.   Pulmonary:      Effort: Pulmonary effort is normal. No respiratory distress.   Musculoskeletal:        Hands:       Comments: Left thumb with a V shaped flap type laceration approximately 2 cm with some bleeding.  Good strength range of motion   Neurological:      Mental Status: He is alert.      Motor: No abnormal muscle tone.   Psychiatric:         Mood and Affect: Mood normal.         Behavior: Behavior normal.                             Assessment & Plan     1. Wound of skin          Procedure: Laceration Repair       - Wound cleaned and/or irrigated w/ NS by MA.  - Clean technique used for procedure   - Local anesthesia with 2% lidocaine  - Closed with #3  5-0 Nylon interrupted sutures with good wound approximation. Care was taken not to disturb/injure underlying bones, joint, joint-space, tendons, nerves and major vessels   - Polysporin and dressing placed  - Patient tolerated well  - wound care/instructions discussed  - 2 day wound check advised, 7 days for suture removal       "

## 2025-02-13 ENCOUNTER — OFFICE VISIT (OUTPATIENT)
Dept: URGENT CARE | Facility: CLINIC | Age: 85
End: 2025-02-13
Payer: MEDICARE

## 2025-02-13 VITALS
DIASTOLIC BLOOD PRESSURE: 60 MMHG | TEMPERATURE: 98.5 F | WEIGHT: 218 LBS | OXYGEN SATURATION: 94 % | SYSTOLIC BLOOD PRESSURE: 110 MMHG | HEIGHT: 69 IN | HEART RATE: 72 BPM | RESPIRATION RATE: 16 BRPM | BODY MASS INDEX: 32.29 KG/M2

## 2025-02-13 DIAGNOSIS — Z51.89 ENCOUNTER FOR WOUND CARE: ICD-10-CM

## 2025-02-13 DIAGNOSIS — T14.8XXA WOUND OF SKIN: ICD-10-CM

## 2025-02-13 DIAGNOSIS — Z48.02 VISIT FOR SUTURE REMOVAL: ICD-10-CM

## 2025-02-13 PROCEDURE — 15853 REMOVAL SUTR/STAPL XREQ ANES: CPT

## 2025-02-13 PROCEDURE — 3074F SYST BP LT 130 MM HG: CPT

## 2025-02-13 PROCEDURE — 99212 OFFICE O/P EST SF 10 MIN: CPT

## 2025-02-13 PROCEDURE — 3078F DIAST BP <80 MM HG: CPT

## 2025-02-13 ASSESSMENT — ENCOUNTER SYMPTOMS
BLURRED VISION: 0
PALPITATIONS: 0
FALLS: 0
DIZZINESS: 0
SHORTNESS OF BREATH: 0
WEAKNESS: 0
CHILLS: 0
ABDOMINAL PAIN: 0
VOMITING: 0
DIARRHEA: 0
FEVER: 0
NAUSEA: 0
COUGH: 0
FOCAL WEAKNESS: 0
LOSS OF CONSCIOUSNESS: 0
MYALGIAS: 0

## 2025-02-13 ASSESSMENT — FIBROSIS 4 INDEX: FIB4 SCORE: 1.86

## 2025-02-13 NOTE — PROGRESS NOTES
"Subjective:   Donny Haas is a 84 y.o. male who presents for Suture / Staple Removal (Was here on 2/06/25, 3 sutures placed)      Suture / Staple Removal  The sutures were placed 7 to 10 days ago. He tried a wound recheck, regular soap and water washings and antibiotic ointment use since the wound repair. The treatment provided significant relief. There has been no drainage from the wound. There is no redness present. There is no swelling present. There is no pain present. He has no difficulty moving the affected extremity or digit.       Review of Systems   Constitutional:  Negative for chills, fever and malaise/fatigue.   HENT:  Negative for ear discharge.    Eyes:  Negative for blurred vision.   Respiratory:  Negative for cough and shortness of breath.    Cardiovascular:  Negative for chest pain, palpitations and leg swelling.   Gastrointestinal:  Negative for abdominal pain, diarrhea, nausea and vomiting.   Musculoskeletal:  Negative for falls and myalgias.   Skin:  Negative for itching and rash.   Neurological:  Negative for dizziness, focal weakness, loss of consciousness and weakness.       Medications, Allergies, and current problem list reviewed today in Epic.     Objective:     /60 (BP Location: Left arm, Patient Position: Sitting, BP Cuff Size: Adult)   Pulse 72   Temp 36.9 °C (98.5 °F) (Temporal)   Resp 16   Ht 1.753 m (5' 9\")   Wt 98.9 kg (218 lb)   SpO2 94%     Physical Exam  Constitutional:       General: He is not in acute distress.     Appearance: Normal appearance. He is not ill-appearing or toxic-appearing.   HENT:      Head: Normocephalic and atraumatic.      Nose: No congestion or rhinorrhea.   Eyes:      General: No scleral icterus.        Right eye: No discharge.         Left eye: No discharge.   Cardiovascular:      Rate and Rhythm: Normal rate and regular rhythm.   Pulmonary:      Effort: Pulmonary effort is normal. No respiratory distress.      Breath sounds: " Normal breath sounds.   Abdominal:      General: Abdomen is flat.      Palpations: Abdomen is soft.   Musculoskeletal:         General: Normal range of motion.      Cervical back: Normal range of motion.   Skin:     Findings: Lesion present.   Neurological:      General: No focal deficit present.      Mental Status: He is alert and oriented to person, place, and time. Mental status is at baseline.   Psychiatric:         Behavior: Behavior normal.         Judgment: Judgment normal.         Assessment/Plan:   Donny was seen today for suture / staple removal.    Diagnoses and all orders for this visit:    Wound of skin    Encounter for wound care    Visit for suture removal    Well healing, stitches removed without complication. On my exam I do not see any signs or symptoms consistent with a bacterial infection currently requiring oral antibiotics.  Strict return and ED precautions were discussed and all questions were answered.     Differential diagnosis, natural history, supportive care, and indications for immediate follow-up discussed.    Advised the patient to follow-up with the primary care physician for recheck, reevaluation, and consideration of further management.    Please note that this dictation was created using voice recognition software. I have made a reasonable attempt to correct obvious errors, but I expect that there are errors of grammar and possibly content that I did not discover before finalizing the note.    This note was electronically signed by elis Ahn MD PhD

## 2025-02-13 NOTE — PROCEDURES
Suture Removal    Date/Time: 2/13/2025 8:33 AM    Performed by: Rafaela Ahn MD, PhD  Authorized by: Rafaela Ahn MD, PhD  Body area: upper extremity  Location details: left thumb  Wound Appearance: clean  Sutures Removed: 3  Post-removal: dressing applied  Facility: sutures placed in this facility  Patient tolerance: patient tolerated the procedure well with no immediate complications

## (undated) DEVICE — SUCTION INSTRUMENT YANKAUER BULBOUS TIP W/O VENT (50EA/CA)

## (undated) DEVICE — STERI STRIP COMPOUND BENZOIN - TINCTURE 0.6ML WITH APPLICATOR (40EA/BX)

## (undated) DEVICE — SUTURE GENERAL

## (undated) DEVICE — PAD PREP 24 X 48 CUFFED - (100/CA)

## (undated) DEVICE — SODIUM CHL IRRIGATION 0.9% 1000ML (12EA/CA)

## (undated) DEVICE — HUMID-VENT HEAT AND MOISTURE EXCHANGE- (50/BX)

## (undated) DEVICE — DRAPE LARGE 3 QUARTER - (20/CA)

## (undated) DEVICE — TUBING CLEARLINK DUO-VENT - C-FLO (48EA/CA)

## (undated) DEVICE — SODIUM CHL. IRRIGATION 0.9% 3000ML (4EA/CA 65CA/PF)

## (undated) DEVICE — SUTURE 3-0 MONOCRYL PLUS PS-2 - (12/BX)

## (undated) DEVICE — DRAPETIBURON SHOULDER W/POUCH - (5EA/CA)

## (undated) DEVICE — FIBERWIRE 2.0 (12EA/BX)

## (undated) DEVICE — TOWEL STOP TIMEOUT SAFETY FLAG (40EA/CA)

## (undated) DEVICE — GLOVE BIOGEL INDICATOR SZ 8 SURGICAL PF LTX - (50/BX 4BX/CA)

## (undated) DEVICE — BLADE SAGITTAL SAW DUAL CUT 75.0 X 25.0MM (1/EA)

## (undated) DEVICE — APPLIER OPEN MEDIUM CLIP (6EA/BX)

## (undated) DEVICE — DEVICE SKIN CLOSURE SURGICAL ZIP 16 (10EA/PK)

## (undated) DEVICE — SENSOR OXIMETER ADULT SPO2 RD SET (20EA/BX)

## (undated) DEVICE — COVER LIGHT HANDLE FLEXIBLE - SOFT (2EA/PK 80PK/CA)

## (undated) DEVICE — PACK TOTAL HIP - (1/CA)

## (undated) DEVICE — PACK MAJOR ORTHO - (2EA/CA)

## (undated) DEVICE — SPIDER SHOULDER HOLDER (12EA/BX)

## (undated) DEVICE — Device

## (undated) DEVICE — CANISTER SUCTION RIGID RED 1500CC (40EA/CA)

## (undated) DEVICE — SET EXTENSION WITH 2 PORTS (48EA/CA) ***PART #2C8610 IS A SUBSTITUTE*****

## (undated) DEVICE — LACTATED RINGERS INJ 1000 ML - (14EA/CA 60CA/PF)

## (undated) DEVICE — DRESSING AQUACEL AG ADVANTAGE 3.5 X 10" (10EA/BX)"

## (undated) DEVICE — DRAPE U SPLIT IMP 54 X 76 - (24/CA)

## (undated) DEVICE — GLOVE BIOGEL SZ 8 SURGICAL PF LTX - (50PR/BX 4BX/CA)